# Patient Record
Sex: FEMALE | Race: WHITE | HISPANIC OR LATINO | URBAN - METROPOLITAN AREA
[De-identification: names, ages, dates, MRNs, and addresses within clinical notes are randomized per-mention and may not be internally consistent; named-entity substitution may affect disease eponyms.]

---

## 2022-08-24 ENCOUNTER — EMERGENCY (EMERGENCY)
Facility: HOSPITAL | Age: 77
LOS: 1 days | Discharge: HOME | End: 2022-08-24
Attending: STUDENT IN AN ORGANIZED HEALTH CARE EDUCATION/TRAINING PROGRAM
Payer: MEDICARE

## 2022-08-24 VITALS
SYSTOLIC BLOOD PRESSURE: 106 MMHG | RESPIRATION RATE: 16 BRPM | HEIGHT: 60 IN | HEART RATE: 68 BPM | TEMPERATURE: 98 F | WEIGHT: 164.02 LBS | DIASTOLIC BLOOD PRESSURE: 73 MMHG | OXYGEN SATURATION: 96 %

## 2022-08-24 DIAGNOSIS — G56.00 CARPAL TUNNEL SYNDROME, UNSPECIFIED UPPER LIMB: Chronic | ICD-10-CM

## 2022-08-24 LAB
APPEARANCE UR: CLEAR — SIGNIFICANT CHANGE UP
BACTERIA # UR AUTO: ABNORMAL /HPF
BILIRUB UR-MCNC: ABNORMAL
COLOR SPEC: YELLOW — SIGNIFICANT CHANGE UP
COMMENT - URINE: SIGNIFICANT CHANGE UP
DIFF PNL FLD: NEGATIVE — SIGNIFICANT CHANGE UP
EPI CELLS # UR: ABNORMAL /HPF
GLUCOSE UR QL: NEGATIVE — SIGNIFICANT CHANGE UP
HYALINE CASTS # UR AUTO: ABNORMAL /LPF
KETONES UR-MCNC: ABNORMAL
LEUKOCYTE ESTERASE UR-ACNC: ABNORMAL
NITRITE UR-MCNC: NEGATIVE — SIGNIFICANT CHANGE UP
PH UR: 5 — SIGNIFICANT CHANGE UP (ref 5–8)
PROT UR-MCNC: 30 MG/DL
RBC CASTS # UR COMP ASSIST: SIGNIFICANT CHANGE UP /HPF (ref 0–2)
SP GR SPEC: 1.02 — SIGNIFICANT CHANGE UP (ref 1.01–1.02)
UROBILINOGEN FLD QL: NEGATIVE — SIGNIFICANT CHANGE UP
WBC UR QL: SIGNIFICANT CHANGE UP /HPF (ref 0–5)

## 2022-08-24 PROCEDURE — 87086 URINE CULTURE/COLONY COUNT: CPT

## 2022-08-24 PROCEDURE — 99283 EMERGENCY DEPT VISIT LOW MDM: CPT

## 2022-08-24 PROCEDURE — 87186 SC STD MICRODIL/AGAR DIL: CPT

## 2022-08-24 PROCEDURE — 81001 URINALYSIS AUTO W/SCOPE: CPT

## 2022-08-24 PROCEDURE — 82962 GLUCOSE BLOOD TEST: CPT

## 2022-08-24 RX ORDER — CEFPODOXIME PROXETIL 100 MG
1 TABLET ORAL
Qty: 14 | Refills: 0
Start: 2022-08-24 | End: 2022-08-30

## 2022-08-24 NOTE — ED PROVIDER NOTE - CLINICAL SUMMARY MEDICAL DECISION MAKING FREE TEXT BOX
Edgard: 76 y.o female with a pmhx of hypertension and diabetes is presenting with x3 days of urinary frequency. Patient reports urinary frequency and urinary urgency for the past x2-3 days. Patient states she has a history of UTI with similar symptoms. Patient denies fevers, abdominal pain, burning with urination, flank pain, hematuria, vomiting, and diarrhea. Pt well appearing, abdomen non-tender, no CVA tenderness. Will obtain labs r/o UTI, supportive treatment with dispo pending workup.

## 2022-08-24 NOTE — ED PROVIDER NOTE - NSFOLLOWUPINSTRUCTIONS_ED_ALL_ED_FT
Urinary Frequency, Adult    Urinary frequency means urinating more often than usual. You may urinate every 1–2 hours even though you drink a normal amount of fluid and do not have a bladder infection or condition. Although you urinate more often than normal, the total amount of urine produced in a day is normal.    With urinary frequency, you may have an urgent need to urinate often. The stress and anxiety of needing to find a bathroom quickly can make this urge worse. This condition may go away on its own or you may need treatment at home. Home treatment may include bladder training, exercises, taking medicines, or making changes to your diet.    Follow these instructions at home:      Bladder health     Keep a bladder diary if told by your health care provider. Keep track of:    What you eat and drink.  How often you urinate.  How much you urinate.  Follow a bladder training program if told by your health care provider. This may include:    Learning to delay going to the bathroom.  Double urinating (voiding). This helps if you are not completely emptying your bladder.  Scheduled voiding.  Do Kegel exercises as told by your health care provider. Kegel exercises strengthen the muscles that help control urination, which may help the condition.        Eating and drinking    If told by your health care provider, make diet changes, such as:    Avoiding caffeine.  Drinking fewer fluids, especially alcohol.  Not drinking in the evening.  Avoiding foods or drinks that may irritate the bladder. These include coffee, tea, soda, artificial sweeteners, citrus, tomato-based foods, and chocolate.  Eating foods that help prevent or ease constipation. Constipation can make this condition worse. Your health care provider may recommend that you:    Drink enough fluid to keep your urine pale yellow.  Take over-the-counter or prescription medicines.  Eat foods that are high in fiber, such as beans, whole grains, and fresh fruits and vegetables.  Limit foods that are high in fat and processed sugars, such as fried or sweet foods.        General instructions    Take over-the-counter and prescription medicines only as told by your health care provider.  Keep all follow-up visits as told by your health care provider. This is important.    Contact a health care provider if:  You start urinating more often.  You feel pain or irritation when you urinate.  You notice blood in your urine.  Your urine looks cloudy.  You develop a fever.  You begin vomiting.    Get help right away if:  You are unable to urinate.    Summary  Urinary frequency means urinating more often than usual. With urinary frequency, you may urinate every 1–2 hours even though you drink a normal amount of fluid and do not have a bladder infection or other bladder condition.  Your health care provider may recommend that you keep a bladder diary, follow a bladder training program, or make dietary changes.  If told by your health care provider, do Kegel exercises to strengthen the muscles that help control urination.  Take over-the-counter and prescription medicines only as told by your health care provider.  Contact a health care provider if your symptoms do not improve or get worse.

## 2022-08-24 NOTE — ED PROVIDER NOTE - PROGRESS NOTE DETAILS
Ene-: pt seen and re-evaluated at bedside.  Discussed UA results showing trace leuk esterase, otherwise no other signs of UTI. Pt with UTI symptoms, will treat. Discussed importance of PMD follow up with return precautions, pt understood and agreeable with plan.

## 2022-08-24 NOTE — ED PROVIDER NOTE - OBJECTIVE STATEMENT
76 y.o female with a pmhx of hypertension and diabetes is presenting with x3 days of urinary frequency. Patient reports urinary frequency and urinary urgency for the past x2-3 days. Patient states she has a history of UTI with similar symptoms. Patient denies fevers, abdominal pain, burning with urination, flank pain, hematuria, vomiting, and diarrhea. Patient has not other complaints.

## 2022-08-24 NOTE — ED PROVIDER NOTE - PATIENT PORTAL LINK FT
You can access the FollowMyHealth Patient Portal offered by [facility] by registering at the following website: [email or web address]. By joining [employer]’s FollowMyHealth portal, you will also be able to view your health information using other applications (apps) compatible with our system.

## 2022-08-27 LAB
-  AMPICILLIN/SULBACTAM: SIGNIFICANT CHANGE UP
-  CEFAZOLIN: SIGNIFICANT CHANGE UP
-  GENTAMICIN: SIGNIFICANT CHANGE UP
-  OXACILLIN: SIGNIFICANT CHANGE UP
-  PENICILLIN: SIGNIFICANT CHANGE UP
-  RIFAMPIN: SIGNIFICANT CHANGE UP
-  TETRACYCLINE: SIGNIFICANT CHANGE UP
-  TRIMETHOPRIM/SULFAMETHOXAZOLE: SIGNIFICANT CHANGE UP
-  VANCOMYCIN: SIGNIFICANT CHANGE UP
CULTURE RESULTS: SIGNIFICANT CHANGE UP
METHOD TYPE: SIGNIFICANT CHANGE UP
ORGANISM # SPEC MICROSCOPIC CNT: SIGNIFICANT CHANGE UP
ORGANISM # SPEC MICROSCOPIC CNT: SIGNIFICANT CHANGE UP
SPECIMEN SOURCE: SIGNIFICANT CHANGE UP

## 2022-10-01 ENCOUNTER — EMERGENCY (EMERGENCY)
Facility: HOSPITAL | Age: 77
LOS: 1 days | Discharge: HOME | End: 2022-10-01
Attending: EMERGENCY MEDICINE
Payer: MEDICARE

## 2022-10-01 VITALS
RESPIRATION RATE: 18 BRPM | HEART RATE: 82 BPM | TEMPERATURE: 98 F | WEIGHT: 141.1 LBS | DIASTOLIC BLOOD PRESSURE: 85 MMHG | HEIGHT: 60 IN | SYSTOLIC BLOOD PRESSURE: 139 MMHG | OXYGEN SATURATION: 98 %

## 2022-10-01 DIAGNOSIS — G56.00 CARPAL TUNNEL SYNDROME, UNSPECIFIED UPPER LIMB: Chronic | ICD-10-CM

## 2022-10-01 PROCEDURE — 99283 EMERGENCY DEPT VISIT LOW MDM: CPT | Mod: 25

## 2022-10-01 PROCEDURE — 87205 SMEAR GRAM STAIN: CPT

## 2022-10-01 PROCEDURE — 10061 I&D ABSCESS COMP/MULTIPLE: CPT

## 2022-10-01 PROCEDURE — 87186 SC STD MICRODIL/AGAR DIL: CPT

## 2022-10-01 PROCEDURE — 99284 EMERGENCY DEPT VISIT MOD MDM: CPT | Mod: 25

## 2022-10-01 PROCEDURE — 10060 I&D ABSCESS SIMPLE/SINGLE: CPT

## 2022-10-01 PROCEDURE — 87070 CULTURE OTHR SPECIMN AEROBIC: CPT

## 2022-10-01 RX ORDER — CEPHALEXIN 500 MG
1 CAPSULE ORAL
Qty: 15 | Refills: 0
Start: 2022-10-01 | End: 2022-10-05

## 2022-10-01 NOTE — ED PROVIDER NOTE - OBJECTIVE STATEMENT
77 y/o female with a history of hypertension, diabetes and takes Metformin presents to the ER with an abscess/sore on her lower right buttock that started about 3 days ago. Patient endorses a subjective fever. Patient allergic to codeine.

## 2022-10-01 NOTE — ED PROVIDER NOTE - PATIENT PORTAL LINK FT
You can access the FollowMyHealth Patient Portal offered by [facility] by registering at the following website: [email or web address]. By joining [employer]’s FollowMyHealth portal, you will also be able to view your health information using other applications (apps) compatible with our system.

## 2022-10-01 NOTE — ED PROVIDER NOTE - PROGRESS NOTE DETAILS
I and D drained, pt started on abx, and return in 48 hrs for wound check. Return precautions provided, and ready for d/c.

## 2022-10-01 NOTE — ED PROCEDURE NOTE - CPROC ED POST PROC CARE GUIDE1
Verbal/written post procedure instructions were given to patient/caregiver. Verbal/written post procedure instructions were given to patient/caregiver./Instructed patient/caregiver to follow-up with primary care physician./Instructed patient/caregiver regarding signs and symptoms of infection./Keep the cast/splint/dressing clean and dry.

## 2022-10-01 NOTE — ED PROVIDER NOTE - CLINICAL SUMMARY MEDICAL DECISION MAKING FREE TEXT BOX
Abscess in right lower buttock. Plan is to do I&D. Abscess in right lower buttock. No evidence of rectal involvement or vaginal involvement, or cellulitis  Plan is to do I&D.

## 2022-10-01 NOTE — ED ADULT NURSE NOTE - NSIMPLEMENTINTERV_GEN_ALL_ED
Implemented All Universal Safety Interventions:  [place] to call system. Call bell, personal items and telephone within reach. Instruct patient to call for assistance. Room bathroom lighting operational. Non-slip footwear when patient is off stretcher. Physically safe environment: no spills, clutter or unnecessary equipment. Stretcher in lowest position, wheels locked, appropriate side rails in place.

## 2022-10-01 NOTE — ED PROVIDER NOTE - NSICDXPASTMEDICALHX_GEN_ALL_CORE_FT
PAST MEDICAL HISTORY:  Gastritis     History of hypertension     Hypercholesterolemia     Osteopenia

## 2022-10-03 ENCOUNTER — EMERGENCY (EMERGENCY)
Facility: HOSPITAL | Age: 77
LOS: 1 days | Discharge: HOME | End: 2022-10-03
Attending: STUDENT IN AN ORGANIZED HEALTH CARE EDUCATION/TRAINING PROGRAM
Payer: MEDICARE

## 2022-10-03 VITALS
HEART RATE: 67 BPM | TEMPERATURE: 98 F | RESPIRATION RATE: 19 BRPM | DIASTOLIC BLOOD PRESSURE: 73 MMHG | WEIGHT: 151.9 LBS | OXYGEN SATURATION: 95 % | SYSTOLIC BLOOD PRESSURE: 116 MMHG | HEIGHT: 60 IN

## 2022-10-03 DIAGNOSIS — G56.00 CARPAL TUNNEL SYNDROME, UNSPECIFIED UPPER LIMB: Chronic | ICD-10-CM

## 2022-10-03 LAB
-  AMPICILLIN/SULBACTAM: SIGNIFICANT CHANGE UP
-  CEFAZOLIN: SIGNIFICANT CHANGE UP
-  CLINDAMYCIN: SIGNIFICANT CHANGE UP
-  ERYTHROMYCIN: SIGNIFICANT CHANGE UP
-  GENTAMICIN: SIGNIFICANT CHANGE UP
-  OXACILLIN: SIGNIFICANT CHANGE UP
-  PENICILLIN: SIGNIFICANT CHANGE UP
-  RIFAMPIN: SIGNIFICANT CHANGE UP
-  TETRACYCLINE: SIGNIFICANT CHANGE UP
-  TRIMETHOPRIM/SULFAMETHOXAZOLE: SIGNIFICANT CHANGE UP
-  VANCOMYCIN: SIGNIFICANT CHANGE UP
METHOD TYPE: SIGNIFICANT CHANGE UP

## 2022-10-03 PROCEDURE — 99283 EMERGENCY DEPT VISIT LOW MDM: CPT

## 2022-10-03 PROCEDURE — G0463: CPT

## 2022-10-03 RX ORDER — ACETAMINOPHEN 500 MG
975 TABLET ORAL ONCE
Refills: 0 | Status: COMPLETED | OUTPATIENT
Start: 2022-10-03 | End: 2022-10-03

## 2022-10-03 RX ORDER — AZTREONAM 2 G
1 VIAL (EA) INJECTION
Qty: 14 | Refills: 0
Start: 2022-10-03 | End: 2022-10-09

## 2022-10-03 RX ADMIN — Medication 975 MILLIGRAM(S): at 14:27

## 2022-10-03 RX ADMIN — Medication 1 TABLET(S): at 14:27

## 2022-10-03 NOTE — ED ADULT TRIAGE NOTE - CHIEF COMPLAINT QUOTE
patient here for F/U of I&D of right lower buttock that was done on 10/1/22 was told to return for F/U

## 2022-10-03 NOTE — ED PROVIDER NOTE - CLINICAL SUMMARY MEDICAL DECISION MAKING FREE TEXT BOX
Edgard: 76 year old female with PMH HTN, DM presents for wound check. Pt with abscess to right buttock x 5 days, seen 2 days ago with I&D performed and packing placed, discharged with Keflex. Pt returning today for wound check. Pt reports mildly improved pain and occasional nausea with Keflex. Denies any fevers, abdominal pain, numbness, weakness, or rash. Abscess site with surrounding erythema, minimal fluctuance, no drainage. Packing in place. Wound culture growing staph aureus, sensitivities pending. Will change abx for MRSA coverage, advised pt to return in 2 days for repeat wound check with strict return precautions. Pt understood and agreeable with plan.

## 2022-10-03 NOTE — ED PROVIDER NOTE - PHYSICAL EXAMINATION
CONSTITUTIONAL: non-toxic, well appearing  SKIN: no petechiae.  EYES: pink conjunctiva, anicteric  NECK: Supple; no meningismus, no JVD  CARD: RRR, no murmurs, equal radial pulses bilaterally 2+  RESP: CTAB, no respiratory distress  ABD: Soft, non-tender, non-distended, no peritoneal signs, no CVA tenderness  : Chaperone RN Chris. Right buttock abscess with surrounding erythema, packing in place, tender, no discharge noted.   EXT: Normal ROM x4. No edema.   NEURO: Alert, oriented. Neuro exam nonfocal.  PSYCH: Cooperative, appropriate.

## 2022-10-03 NOTE — ED PROVIDER NOTE - NSFOLLOWUPINSTRUCTIONS_ED_ALL_ED_FT
Abscess    An abscess is an infected area that contains a collection of pus and debris. It can occur in almost any part of the body and occurs when the tissue gets infection. Symptoms include a painful mass that is red, warm, tender that might break open and HAVE drainage. If your health care provider gave you antibiotics make sure to take the full course and do not stop even if feeling better.     Take over the counter acetaminophen (Tylenol) 650-1000 mg every 4-6 hours as needed for pain. Do not take more than 3000 mg in a 24 hour period. Be aware many over the counter and prescription medications also contain acetaminophen (Tylenol).     Stop taking cephalexin and start taking trimethoprim-sulfamethoxazole (Bactrim) twice daily for 7 days.    Return in 2 days for wound check.     SEEK IMMEDIATE MEDICAL CARE IF YOU HAVE ANY OF THE FOLLOWING SYMPTOMS: chills, fever, muscle aches, or red streaking from the area.

## 2022-10-03 NOTE — ED PROVIDER NOTE - PATIENT PORTAL LINK FT
Medical Week 1 Survey      Responses   Facility patient discharged from?  [facility]   Does the patient have one of the following disease processes/diagnoses(primary or secondary)?  Other   Is there a successful TCM telephone encounter documented?  No   Week 1 attempt successful?  Yes   Call start time  1138   Call end time  1143   Discharge diagnosis  GI bleed   Is patient permission given to speak with other caregiver?  Yes   List who call center can speak with     Meds reviewed with patient/caregiver?  Yes   Is the patient having any side effects they believe may be caused by any medication additions or changes?  No   Does the patient have all medications ordered at discharge?  Yes   Is the patient taking all medications as directed (includes completed medication regime)?  Yes   Does the patient have a primary care provider?   Yes   Does the patient have an appointment with their PCP within 7 days of discharge?  Yes   Has the patient kept scheduled appointments due by today?  N/A   Psychosocial issues?  No   Comments  Pt reports no further blood in stool. Having soft,formed stool. Still having some abd pain 3/10. Eating small frequent meals, no fresh fruits/veggies per pt. Appetite is WNL.    Did the patient receive a copy of their discharge instructions?  Yes   Nursing interventions  Reviewed instructions with patient   What is the patient's perception of their health status since discharge?  Improving   Is the patient/caregiver able to teach back signs and symptoms related to disease process for when to call PCP?  Yes   Is the patient/caregiver able to teach back signs and symptoms related to disease process for when to call 911?  Yes   Is the patient/caregiver able to teach back the hierarchy of who to call/visit for symptoms/problems? PCP, Specialist, Home health nurse, Urgent Care, ED, 911  Yes   Week 1 call completed?  Yes          Macie Cadena RN   You can access the FollowMyHealth Patient Portal offered by [facility] by registering at the following website: [email or web address]. By joining [employer]’s FollowMyHealth portal, you will also be able to view your health information using other applications (apps) compatible with our system.

## 2022-10-03 NOTE — ED PROVIDER NOTE - OBJECTIVE STATEMENT
76 year old female with PMH HTN, DM presents for wound check. Pt with abscess to right buttock x 5 days, seen 2 days ago with I&D performed and packing placed, discharged with Keflex. Pt returning today for wound check. Pt reports mildly improved pain and occasional nausea with Keflex. Denies any fevers, abdominal pain, numbness, weakness, or rash. Denies any additional complaints.

## 2022-10-06 ENCOUNTER — INPATIENT (INPATIENT)
Facility: HOSPITAL | Age: 77
LOS: 31 days | Discharge: HOME | DRG: 603 | End: 2022-11-07
Attending: INTERNAL MEDICINE | Admitting: INTERNAL MEDICINE
Payer: MEDICARE

## 2022-10-06 VITALS
TEMPERATURE: 98 F | WEIGHT: 141.1 LBS | SYSTOLIC BLOOD PRESSURE: 123 MMHG | RESPIRATION RATE: 16 BRPM | OXYGEN SATURATION: 96 % | DIASTOLIC BLOOD PRESSURE: 80 MMHG | HEIGHT: 60 IN | HEART RATE: 61 BPM

## 2022-10-06 DIAGNOSIS — I10 ESSENTIAL (PRIMARY) HYPERTENSION: ICD-10-CM

## 2022-10-06 DIAGNOSIS — N17.9 ACUTE KIDNEY FAILURE, UNSPECIFIED: ICD-10-CM

## 2022-10-06 DIAGNOSIS — G56.00 CARPAL TUNNEL SYNDROME, UNSPECIFIED UPPER LIMB: Chronic | ICD-10-CM

## 2022-10-06 DIAGNOSIS — L02.31 CUTANEOUS ABSCESS OF BUTTOCK: ICD-10-CM

## 2022-10-06 DIAGNOSIS — L02.215 CUTANEOUS ABSCESS OF PERINEUM: ICD-10-CM

## 2022-10-06 DIAGNOSIS — Z29.9 ENCOUNTER FOR PROPHYLACTIC MEASURES, UNSPECIFIED: ICD-10-CM

## 2022-10-06 DIAGNOSIS — E11.9 TYPE 2 DIABETES MELLITUS WITHOUT COMPLICATIONS: ICD-10-CM

## 2022-10-06 LAB
ALBUMIN SERPL ELPH-MCNC: 3.4 G/DL — LOW (ref 3.5–5)
ALP SERPL-CCNC: 81 U/L — SIGNIFICANT CHANGE UP (ref 40–120)
ALT FLD-CCNC: 18 U/L DA — SIGNIFICANT CHANGE UP (ref 10–60)
ANION GAP SERPL CALC-SCNC: 10 MMOL/L — SIGNIFICANT CHANGE UP (ref 5–17)
APTT BLD: 26 SEC — LOW (ref 27.5–35.5)
AST SERPL-CCNC: 19 U/L — SIGNIFICANT CHANGE UP (ref 10–40)
BASOPHILS # BLD AUTO: 0.08 K/UL — SIGNIFICANT CHANGE UP (ref 0–0.2)
BASOPHILS NFR BLD AUTO: 1.1 % — SIGNIFICANT CHANGE UP (ref 0–2)
BILIRUB SERPL-MCNC: 0.3 MG/DL — SIGNIFICANT CHANGE UP (ref 0.2–1.2)
BLD GP AB SCN SERPL QL: SIGNIFICANT CHANGE UP
BUN SERPL-MCNC: 20 MG/DL — HIGH (ref 7–18)
CALCIUM SERPL-MCNC: 10.2 MG/DL — SIGNIFICANT CHANGE UP (ref 8.4–10.5)
CHLORIDE SERPL-SCNC: 101 MMOL/L — SIGNIFICANT CHANGE UP (ref 96–108)
CO2 SERPL-SCNC: 25 MMOL/L — SIGNIFICANT CHANGE UP (ref 22–31)
CREAT SERPL-MCNC: 1.33 MG/DL — HIGH (ref 0.5–1.3)
CULTURE RESULTS: SIGNIFICANT CHANGE UP
EGFR: 41 ML/MIN/1.73M2 — LOW
EOSINOPHIL # BLD AUTO: 0.28 K/UL — SIGNIFICANT CHANGE UP (ref 0–0.5)
EOSINOPHIL NFR BLD AUTO: 3.9 % — SIGNIFICANT CHANGE UP (ref 0–6)
GLUCOSE SERPL-MCNC: 116 MG/DL — HIGH (ref 70–99)
HCT VFR BLD CALC: 43.4 % — SIGNIFICANT CHANGE UP (ref 34.5–45)
HGB BLD-MCNC: 14.2 G/DL — SIGNIFICANT CHANGE UP (ref 11.5–15.5)
IMM GRANULOCYTES NFR BLD AUTO: 0.6 % — SIGNIFICANT CHANGE UP (ref 0–0.9)
INR BLD: 0.96 RATIO — SIGNIFICANT CHANGE UP (ref 0.88–1.16)
LACTATE SERPL-SCNC: 1.2 MMOL/L — SIGNIFICANT CHANGE UP (ref 0.7–2)
LACTATE SERPL-SCNC: 2.8 MMOL/L — HIGH (ref 0.7–2)
LYMPHOCYTES # BLD AUTO: 1.45 K/UL — SIGNIFICANT CHANGE UP (ref 1–3.3)
LYMPHOCYTES # BLD AUTO: 20.4 % — SIGNIFICANT CHANGE UP (ref 13–44)
MCHC RBC-ENTMCNC: 30.9 PG — SIGNIFICANT CHANGE UP (ref 27–34)
MCHC RBC-ENTMCNC: 32.7 GM/DL — SIGNIFICANT CHANGE UP (ref 32–36)
MCV RBC AUTO: 94.3 FL — SIGNIFICANT CHANGE UP (ref 80–100)
MONOCYTES # BLD AUTO: 0.6 K/UL — SIGNIFICANT CHANGE UP (ref 0–0.9)
MONOCYTES NFR BLD AUTO: 8.5 % — SIGNIFICANT CHANGE UP (ref 2–14)
NEUTROPHILS # BLD AUTO: 4.65 K/UL — SIGNIFICANT CHANGE UP (ref 1.8–7.4)
NEUTROPHILS NFR BLD AUTO: 65.5 % — SIGNIFICANT CHANGE UP (ref 43–77)
NRBC # BLD: 0 /100 WBCS — SIGNIFICANT CHANGE UP (ref 0–0)
ORGANISM # SPEC MICROSCOPIC CNT: SIGNIFICANT CHANGE UP
ORGANISM # SPEC MICROSCOPIC CNT: SIGNIFICANT CHANGE UP
PLATELET # BLD AUTO: 387 K/UL — SIGNIFICANT CHANGE UP (ref 150–400)
POTASSIUM SERPL-MCNC: 3.7 MMOL/L — SIGNIFICANT CHANGE UP (ref 3.5–5.3)
POTASSIUM SERPL-SCNC: 3.7 MMOL/L — SIGNIFICANT CHANGE UP (ref 3.5–5.3)
PROT SERPL-MCNC: 7.9 G/DL — SIGNIFICANT CHANGE UP (ref 6–8.3)
PROTHROM AB SERPL-ACNC: 11.4 SEC — SIGNIFICANT CHANGE UP (ref 10.5–13.4)
RBC # BLD: 4.6 M/UL — SIGNIFICANT CHANGE UP (ref 3.8–5.2)
RBC # FLD: 12.9 % — SIGNIFICANT CHANGE UP (ref 10.3–14.5)
SARS-COV-2 RNA SPEC QL NAA+PROBE: SIGNIFICANT CHANGE UP
SODIUM SERPL-SCNC: 136 MMOL/L — SIGNIFICANT CHANGE UP (ref 135–145)
SPECIMEN SOURCE: SIGNIFICANT CHANGE UP
WBC # BLD: 7.1 K/UL — SIGNIFICANT CHANGE UP (ref 3.8–10.5)
WBC # FLD AUTO: 7.1 K/UL — SIGNIFICANT CHANGE UP (ref 3.8–10.5)

## 2022-10-06 PROCEDURE — 99285 EMERGENCY DEPT VISIT HI MDM: CPT

## 2022-10-06 PROCEDURE — 74177 CT ABD & PELVIS W/CONTRAST: CPT | Mod: 26,MA

## 2022-10-06 RX ORDER — VANCOMYCIN HCL 1 G
1000 VIAL (EA) INTRAVENOUS EVERY 12 HOURS
Refills: 0 | Status: DISCONTINUED | OUTPATIENT
Start: 2022-10-06 | End: 2022-10-06

## 2022-10-06 RX ORDER — ACETAMINOPHEN 500 MG
650 TABLET ORAL ONCE
Refills: 0 | Status: COMPLETED | OUTPATIENT
Start: 2022-10-06 | End: 2022-10-06

## 2022-10-06 RX ORDER — SODIUM CHLORIDE 9 MG/ML
1000 INJECTION INTRAMUSCULAR; INTRAVENOUS; SUBCUTANEOUS
Refills: 0 | Status: DISCONTINUED | OUTPATIENT
Start: 2022-10-06 | End: 2022-11-07

## 2022-10-06 RX ORDER — SODIUM CHLORIDE 9 MG/ML
1000 INJECTION INTRAMUSCULAR; INTRAVENOUS; SUBCUTANEOUS ONCE
Refills: 0 | Status: COMPLETED | OUTPATIENT
Start: 2022-10-06 | End: 2022-10-06

## 2022-10-06 RX ORDER — CEFEPIME 1 G/1
1000 INJECTION, POWDER, FOR SOLUTION INTRAMUSCULAR; INTRAVENOUS EVERY 8 HOURS
Refills: 0 | Status: DISCONTINUED | OUTPATIENT
Start: 2022-10-06 | End: 2022-10-06

## 2022-10-06 RX ORDER — ACETAMINOPHEN 500 MG
650 TABLET ORAL EVERY 6 HOURS
Refills: 0 | Status: DISCONTINUED | OUTPATIENT
Start: 2022-10-06 | End: 2022-11-07

## 2022-10-06 RX ORDER — CEFEPIME 1 G/1
1000 INJECTION, POWDER, FOR SOLUTION INTRAMUSCULAR; INTRAVENOUS EVERY 8 HOURS
Refills: 0 | Status: DISCONTINUED | OUTPATIENT
Start: 2022-10-07 | End: 2022-10-07

## 2022-10-06 RX ORDER — MORPHINE SULFATE 50 MG/1
1 CAPSULE, EXTENDED RELEASE ORAL EVERY 6 HOURS
Refills: 0 | Status: DISCONTINUED | OUTPATIENT
Start: 2022-10-06 | End: 2022-10-07

## 2022-10-06 RX ORDER — MORPHINE SULFATE 50 MG/1
1 CAPSULE, EXTENDED RELEASE ORAL EVERY 6 HOURS
Refills: 0 | Status: DISCONTINUED | OUTPATIENT
Start: 2022-10-06 | End: 2022-10-06

## 2022-10-06 RX ORDER — ENOXAPARIN SODIUM 100 MG/ML
40 INJECTION SUBCUTANEOUS EVERY 24 HOURS
Refills: 0 | Status: DISCONTINUED | OUTPATIENT
Start: 2022-10-06 | End: 2022-11-07

## 2022-10-06 RX ORDER — LIDOCAINE HCL 20 MG/ML
5 VIAL (ML) INJECTION ONCE
Refills: 0 | Status: COMPLETED | OUTPATIENT
Start: 2022-10-06 | End: 2022-10-06

## 2022-10-06 RX ORDER — VANCOMYCIN HCL 1 G
1000 VIAL (EA) INTRAVENOUS ONCE
Refills: 0 | Status: COMPLETED | OUTPATIENT
Start: 2022-10-06 | End: 2022-10-06

## 2022-10-06 RX ORDER — VANCOMYCIN HCL 1 G
1000 VIAL (EA) INTRAVENOUS EVERY 24 HOURS
Refills: 0 | Status: DISCONTINUED | OUTPATIENT
Start: 2022-10-07 | End: 2022-10-07

## 2022-10-06 RX ADMIN — SODIUM CHLORIDE 80 MILLILITER(S): 9 INJECTION INTRAMUSCULAR; INTRAVENOUS; SUBCUTANEOUS at 22:34

## 2022-10-06 RX ADMIN — Medication 650 MILLIGRAM(S): at 16:02

## 2022-10-06 RX ADMIN — Medication 650 MILLIGRAM(S): at 17:02

## 2022-10-06 RX ADMIN — Medication 250 MILLIGRAM(S): at 15:37

## 2022-10-06 RX ADMIN — Medication 600 MILLIGRAM(S): at 13:30

## 2022-10-06 RX ADMIN — SODIUM CHLORIDE 1000 MILLILITER(S): 9 INJECTION INTRAMUSCULAR; INTRAVENOUS; SUBCUTANEOUS at 13:30

## 2022-10-06 RX ADMIN — Medication 100 MILLIGRAM(S): at 12:55

## 2022-10-06 RX ADMIN — Medication 650 MILLIGRAM(S): at 22:33

## 2022-10-06 RX ADMIN — SODIUM CHLORIDE 1000 MILLILITER(S): 9 INJECTION INTRAMUSCULAR; INTRAVENOUS; SUBCUTANEOUS at 12:55

## 2022-10-06 RX ADMIN — Medication 650 MILLIGRAM(S): at 22:59

## 2022-10-06 RX ADMIN — Medication 1000 MILLIGRAM(S): at 16:37

## 2022-10-06 RX ADMIN — Medication 5 MILLILITER(S): at 12:03

## 2022-10-06 NOTE — CONSULT NOTE ADULT - ASSESSMENT
77 y/o female hx of DM, s/p perineal abscess I&D by ED 10/1. Returning to ED for wound check. Lactate 2.8, WBC WNL, afebrile, VSS  -No plans for further I&D at this time, perineal abscess s/p I&D, spontaneously draining  -Antibiotics as per C&S  -Local wound care, plan for daily packing changes  -Pain control PRN   -Discussed with Dr. Templeton

## 2022-10-06 NOTE — ED PROVIDER NOTE - NS ED ATTENDING STATEMENT MOD
This was a shared visit with the JOAN. I reviewed and verified the documentation and independently performed the documented:

## 2022-10-06 NOTE — H&P ADULT - ATTENDING COMMENTS
Seen and examined . It still hurts . CT scan noted. Surgery input appreciated. IV Abxs . Will consut ID .

## 2022-10-06 NOTE — H&P ADULT - NSHPREVIEWOFSYSTEMS_GEN_ALL_CORE
REVIEW OF SYSTEMS:    CONSTITUTIONAL: No weakness, fevers or chills  EYES/ENT: No visual changes;  No vertigo or throat pain   NECK: No pain or stiffness  RESPIRATORY: No cough, wheezing, hemoptysis; No shortness of breath  CARDIOVASCULAR: No chest pain or palpitations  GASTROINTESTINAL: No abdominal or epigastric pain. No nausea, vomiting, or hematemesis; No diarrhea or constipation. No melena or hematochezia.  GENITOURINARY: No dysuria, frequency or hematuria  NEUROLOGICAL: No numbness or weakness  SKIN: No itching, burning, rashes, +lesions   All other review of systems is negative unless indicated above.

## 2022-10-06 NOTE — ED PROVIDER NOTE - OBJECTIVE STATEMENT
76 year old female with PMH HTN, DM presents for wound check. Pt with abscess to right buttock x 7 days, seen 4 days ago, 2 days ago again with I&D performed and packing placed, discharged with Keflex initially then bactrim 2 days ago. Pt returning today for wound check. Pt reports area is starting to get worse despite abx. Denies any fevers, abdominal pain, numbness, weakness, or rash. Denies any additional complaints.

## 2022-10-06 NOTE — H&P ADULT - NSHPPHYSICALEXAM_GEN_ALL_CORE
PHYSICAL EXAMINATION:  GENERAL: NAD, well built  HEAD:  Atraumatic, Normocephalic  EYES:  conjunctiva and sclera clear  NECK: Supple, No JVD, Normal thyroid  CHEST/LUNG: Clear to auscultation. Clear to percussion bilaterally; No rales, rhonchi, wheezing, or rubs  HEART: Regular rate and rhythm; No murmurs, rubs, or gallops  ABDOMEN: Soft, Nontender, Nondistended; Bowel sounds present  NERVOUS SYSTEM:  Alert & Oriented X3,    EXTREMITIES:  2+ Peripheral Pulses, No clubbing, cyanosis, or edema  : Right lower perineum covered in dressing, no active drainage

## 2022-10-06 NOTE — CONSULT NOTE ADULT - SUBJECTIVE AND OBJECTIVE BOX
GENERAL SURGERY CONSULT NOTE    Patient is a 76y old  Female who presents with a chief complaint of perineal abscess     HPI:76 year old female with PMH HTN, DM presents for wound check. Pt with abscess to right buttock x 7 days, seen 4 days ago, 2 days ago again with I&D performed and packing placed, discharged with Keflex initially then bactrim 2 days ago. Pt returning today for wound check. Pt reports area is starting to get worse despite abx. Denies any fevers, abdominal pain, numbness, weakness, or rash. Denies any additional complaints.    General surgery consulted on 77 y/o female with right sided perineal abscess that began one week ago. Pt was seen in the 10/1 and an I&D was performed, 5cc of purulent fluid expressed, packing was placed and sent home with Keflex. Pt returned to the ED 10/3 for wound check, wound was OK, antibiotics were changed to bactrim. Pt returning to ED today with complaints of continued pain, worsening erythema, purulent discharge spontaneously draining from abscess site. Pt seen and evaluated at bedside, wound open with SS drainage noted on dressing, packing removed prior to examination. Reports subjective fever/chills at home, denies N/V, change in bowel or bladder function, chest pain or SOB.     PAST MEDICAL & SURGICAL HISTORY:  History of hypertension      Osteopenia      Gastritis      Hypercholesterolemia      Carpal tunnel syndrome          Review of Systems:    I have reviewed 9 systems with the patient and the only positive findings were above    MEDICATIONS  (STANDING):    MEDICATIONS  (PRN):      Allergies    codeine (Rash)    Intolerances      FAMILY HISTORY:      Vital Signs Last 24 Hrs  T(C): 36.4 (06 Oct 2022 10:25), Max: 36.4 (06 Oct 2022 10:25)  T(F): 97.5 (06 Oct 2022 10:25), Max: 97.5 (06 Oct 2022 10:25)  HR: 61 (06 Oct 2022 10:25) (61 - 61)  BP: 123/80 (06 Oct 2022 10:25) (123/80 - 123/80)  BP(mean): --  RR: 16 (06 Oct 2022 10:25) (16 - 16)  SpO2: 96% (06 Oct 2022 10:25) (96% - 96%)    Parameters below as of 06 Oct 2022 10:25  Patient On (Oxygen Delivery Method): room air        Physical Exam:    General:  Appears stated age, well-groomed, no distress  Eyes : EOMI   HENT:  WNL, no JVD  Respirations: Unlabored   Abdomen: Soft, nondistended, nontender   Rectal: Right lower buttock, perineum incision site spontaneously draining SS fluid, surrounding erythema and induration, no palpable fluctuance or crepitus  Extremities: No edema b/l   Skin:  Warm and dry   Musculoskeletal:  No calf tenderness b/l   Neuro: Alert, oriented to time, place and person   Psych:  Normal affect       LABS:                        14.2   7.10  )-----------( 387      ( 06 Oct 2022 12:00 )             43.4     10-06    136  |  101  |  20<H>  ----------------------------<  116<H>  3.7   |  25  |  1.33<H>    Ca    10.2      06 Oct 2022 12:00    TPro  7.9  /  Alb  3.4<L>  /  TBili  0.3  /  DBili  x   /  AST  19  /  ALT  18  /  AlkPhos  81  10-06    PT/INR - ( 06 Oct 2022 12:00 )   PT: 11.4 sec;   INR: 0.96 ratio         PTT - ( 06 Oct 2022 12:00 )  PTT:26.0 sec

## 2022-10-06 NOTE — H&P ADULT - HISTORY OF PRESENT ILLNESS
76 year old with PMH of HTN, DM is here for worsening of perineal abscess.  Patient presented with perineal abscess and had an I/D on 10/1 in the ED and was discharged on keflex.  Patient states that  worsening erythema, purulent discharge spontaneously draining from abscess site. 76 year old with PMH of HTN, DM is here for worsening of perineal abscess.  Patient presented with perineal abscess and had an I/D on 10/1 in the ED and was discharged on keflex.  Patient states that she had worsening redness and  purulent discharge from abscess site. Patient also endorses subjective fevers, but denies any chills, nausea vomiting chest pain, SOB, abdominal pain, or change in bowel habit.     In the ED:  Afebrile, hemodynamically stable  No leukocytosis  lactate 2.5  CT A/P showed right gluteal fold  with infiltration w/o  discrete drainable collection

## 2022-10-06 NOTE — ED PROVIDER NOTE - PHYSICAL EXAMINATION
GEN:   comfortable, in no apparent distress, AOx3  EYES:   PERRL, extra-occular movements intact  HEENT:   airway patent, moist mucosal membranes, uvula midline  CV:  RRR, Pulses- Radial: 2+ bilateral and equal  RESP:   clear to auscultation bilaterally, non-labored, speaking in full sentences  ABD:   soft, non tender, no guarding  :   no cva tenderness  MSK:   no musculoskeletal tenderness, 5/5 strength, moving all extremities  SKIN:   area of redness and induration in perineum with 1cm incision with packing.  Surrounding area of induration extending to posterior labia majora and gluteal fold. Skin otherwise dry, intact, no rash.  No crepitus, no evidence of taina's gangrene.   NEURO:   AOx3, no focal weakness or loss of sensation, gait normal, GCS 15  PSYCH: calm, cooperative, no apparent risk to self and others

## 2022-10-06 NOTE — H&P ADULT - ASSESSMENT
76 year old with PMH of HTN, DM is here for worsening of perineal abscess after I/D on 10/1. Admitted for further management

## 2022-10-06 NOTE — H&P ADULT - PROBLEM SELECTOR PLAN 5
IMPROVE VTE Individual Risk Assessment          RISK                                                          Points  [  ] Previous VTE                                                3  [  ] Thrombophilia                                             2  [  ] Lower limb paralysis                                   2        (unable to hold up >15 seconds)    [  ] Current Cancer                                             2         (within 6 months)  [x  ] Immobilization > 24 hrs                              1  [  ] ICU/CCU stay > 24 hours                             1  [ x ] Age > 60                                                         1    IMPROVE VTE Score:         [     2    ]    Will start Lovenox 40mg

## 2022-10-06 NOTE — ED PROVIDER NOTE - CLINICAL SUMMARY MEDICAL DECISION MAKING FREE TEXT BOX
76 year old female with PMH HTN, DM presents for wound check. Pt with abscess to right buttock x 7 days, seen 4 days ago, 2 days ago again with I&D performed and packing placed, discharged with Keflex initially then bactrim 2 days ago. Pt returning today for wound check. Pt reports area is starting to get worse despite abx.  Area anesthestized with lidocaine and probed, unable to break up loculations. Will consult surgery and likely admit patient for further management.

## 2022-10-06 NOTE — ED PROVIDER NOTE - NSICDXPASTMEDICALHX_GEN_ALL_CORE_FT
PAST MEDICAL HISTORY:  Gastritis     History of hypertension     Hypercholesterolemia     Osteopenia     
English

## 2022-10-06 NOTE — H&P ADULT - PROBLEM SELECTOR PLAN 1
perineal abscess for 7 days, s/p I/D on 10/1  Now with worsening erythema with pus drainage  Afebrile, no leukocytosis  Lactate 2.8  s/p vanc /clindamycin and 1L bolus    Surgery Consulted: No plans for further I&D at this time  Will C/w vanc and cefepime  f/u repeat lactate perineal abscess for 7 days, s/p I/D on 10/1  Now with worsening erythema with pus drainage  Afebrile, no leukocytosis  Lactate 2.8  CT A/P showed right gluteal fold  with infiltration w/o  discrete drainable collection  s/p vanc /clindamycin and 1L bolus    Surgery Consulted: No plans for further I&D at this time  Will C/w vanc and cefepime  f/u repeat lactate perineal abscess for 7 days, s/p I/D on 10/1  Now with worsening erythema with pus drainage  Afebrile, no leukocytosis  Lactate 2.8  CT A/P showed right gluteal fold  with infiltration w/o  discrete drainable collection  s/p vanc /clindamycin and 1L bolus    Surgery Consulted: No plans for further I&D at this time  Will C/w vanc and cefepime  f/u repeat lactate  Consulted Dr. Limon

## 2022-10-06 NOTE — ED ADULT NURSE NOTE - NSIMPLEMENTINTERV_GEN_ALL_ED
Implemented All Universal Safety Interventions:  [place] to call system. Call bell, personal items and telephone within reach. Instruct patient to call for assistance. Room bathroom lighting operational. Non-slip footwear when patient is off stretcher. Physically safe environment: no spills, clutter or unnecessary equipment. Stretcher in lowest position, wheels locked, appropriate side rails in place.

## 2022-10-06 NOTE — H&P ADULT - NSHPLABSRESULTS_GEN_ALL_CORE
LABS:                        14.2   7.10  )-----------( 387      ( 06 Oct 2022 12:00 )             43.4     10-06    136  |  101  |  20<H>  ----------------------------<  116<H>  3.7   |  25  |  1.33<H>    Ca    10.2      06 Oct 2022 12:00    TPro  7.9  /  Alb  3.4<L>  /  TBili  0.3  /  DBili  x   /  AST  19  /  ALT  18  /  AlkPhos  81  10-06    PT/INR - ( 06 Oct 2022 12:00 )   PT: 11.4 sec;   INR: 0.96 ratio         PTT - ( 06 Oct 2022 12:00 )  PTT:26.0 sec    LIVER FUNCTIONS - ( 06 Oct 2022 12:00 )  Alb: 3.4 g/dL / Pro: 7.9 g/dL / ALK PHOS: 81 U/L / ALT: 18 U/L DA / AST: 19 U/L / GGT: x           Lactate, Blood: 2.8 mmol/L (10-06-22 @ 12:00)

## 2022-10-06 NOTE — ED PROVIDER NOTE - NS ED MD DISPO ADMITTING SERVICE
[facility]  [address]  [facility] [address]  Phone:  [phone]  Fax:  [phone]                                    Gustavo Dover   MRN: [id number]    Department:  [facility]   Date of Visit:  11/13/2019           After Visit Summary Signature Page    I have received my discharge instructions, and my questions have been answered. I have discussed any challenges I see with this plan with the nurse or doctor.    ..........................................................................................................................................  Patient/Patient Representative Signature      ..........................................................................................................................................  Patient Representative Print Name and Relationship to Patient    ..................................................               ................................................  Date                                   Time    ..........................................................................................................................................  Reviewed by Signature/Title    ...................................................              ..............................................  Date                                               Time          22EPIC Rev 08/18       
MEDICINE

## 2022-10-06 NOTE — ED PROVIDER NOTE - ATTENDING APP SHARED VISIT CONTRIBUTION OF CARE
I was physically present for the E/M service provided. I agree with above history, physical, and plan which I have reviewed and edited where appropriate. I was physically present for the key portions of the service provided.    Martines: pt with Dm and had right medial gluteal abscess drained with packing 1 week ago and not improving. attempted to break up deloculation but pt abscess not able to be decompressed- will need to be admitted. no clinical evidence of taina

## 2022-10-07 DIAGNOSIS — R78.81 BACTEREMIA: ICD-10-CM

## 2022-10-07 DIAGNOSIS — Z02.9 ENCOUNTER FOR ADMINISTRATIVE EXAMINATIONS, UNSPECIFIED: ICD-10-CM

## 2022-10-07 LAB
ANION GAP SERPL CALC-SCNC: 9 MMOL/L — SIGNIFICANT CHANGE UP (ref 5–17)
BASOPHILS # BLD AUTO: 0.08 K/UL — SIGNIFICANT CHANGE UP (ref 0–0.2)
BASOPHILS NFR BLD AUTO: 1.2 % — SIGNIFICANT CHANGE UP (ref 0–2)
BUN SERPL-MCNC: 15 MG/DL — SIGNIFICANT CHANGE UP (ref 7–18)
CALCIUM SERPL-MCNC: 9.1 MG/DL — SIGNIFICANT CHANGE UP (ref 8.4–10.5)
CHLORIDE SERPL-SCNC: 103 MMOL/L — SIGNIFICANT CHANGE UP (ref 96–108)
CO2 SERPL-SCNC: 26 MMOL/L — SIGNIFICANT CHANGE UP (ref 22–31)
CREAT SERPL-MCNC: 0.95 MG/DL — SIGNIFICANT CHANGE UP (ref 0.5–1.3)
EGFR: 62 ML/MIN/1.73M2 — SIGNIFICANT CHANGE UP
EOSINOPHIL # BLD AUTO: 0.29 K/UL — SIGNIFICANT CHANGE UP (ref 0–0.5)
EOSINOPHIL NFR BLD AUTO: 4.4 % — SIGNIFICANT CHANGE UP (ref 0–6)
GLUCOSE BLDC GLUCOMTR-MCNC: 112 MG/DL — HIGH (ref 70–99)
GLUCOSE BLDC GLUCOMTR-MCNC: 118 MG/DL — HIGH (ref 70–99)
GLUCOSE BLDC GLUCOMTR-MCNC: 119 MG/DL — HIGH (ref 70–99)
GLUCOSE SERPL-MCNC: 141 MG/DL — HIGH (ref 70–99)
GRAM STN FLD: SIGNIFICANT CHANGE UP
HCT VFR BLD CALC: 38.3 % — SIGNIFICANT CHANGE UP (ref 34.5–45)
HCV AB S/CO SERPL IA: 0.09 S/CO — SIGNIFICANT CHANGE UP (ref 0–0.99)
HCV AB SERPL-IMP: SIGNIFICANT CHANGE UP
HGB BLD-MCNC: 12.4 G/DL — SIGNIFICANT CHANGE UP (ref 11.5–15.5)
IMM GRANULOCYTES NFR BLD AUTO: 0.8 % — SIGNIFICANT CHANGE UP (ref 0–0.9)
LYMPHOCYTES # BLD AUTO: 1.71 K/UL — SIGNIFICANT CHANGE UP (ref 1–3.3)
LYMPHOCYTES # BLD AUTO: 26 % — SIGNIFICANT CHANGE UP (ref 13–44)
MAGNESIUM SERPL-MCNC: 2.2 MG/DL — SIGNIFICANT CHANGE UP (ref 1.6–2.6)
MCHC RBC-ENTMCNC: 31.2 PG — SIGNIFICANT CHANGE UP (ref 27–34)
MCHC RBC-ENTMCNC: 32.4 GM/DL — SIGNIFICANT CHANGE UP (ref 32–36)
MCV RBC AUTO: 96.5 FL — SIGNIFICANT CHANGE UP (ref 80–100)
METHOD TYPE: SIGNIFICANT CHANGE UP
MONOCYTES # BLD AUTO: 0.51 K/UL — SIGNIFICANT CHANGE UP (ref 0–0.9)
MONOCYTES NFR BLD AUTO: 7.8 % — SIGNIFICANT CHANGE UP (ref 2–14)
MSSA DNA SPEC QL NAA+PROBE: SIGNIFICANT CHANGE UP
NEUTROPHILS # BLD AUTO: 3.93 K/UL — SIGNIFICANT CHANGE UP (ref 1.8–7.4)
NEUTROPHILS NFR BLD AUTO: 59.8 % — SIGNIFICANT CHANGE UP (ref 43–77)
NRBC # BLD: 0 /100 WBCS — SIGNIFICANT CHANGE UP (ref 0–0)
PHOSPHATE SERPL-MCNC: 3.7 MG/DL — SIGNIFICANT CHANGE UP (ref 2.5–4.5)
PLATELET # BLD AUTO: 329 K/UL — SIGNIFICANT CHANGE UP (ref 150–400)
POTASSIUM SERPL-MCNC: 4.1 MMOL/L — SIGNIFICANT CHANGE UP (ref 3.5–5.3)
POTASSIUM SERPL-SCNC: 4.1 MMOL/L — SIGNIFICANT CHANGE UP (ref 3.5–5.3)
RBC # BLD: 3.97 M/UL — SIGNIFICANT CHANGE UP (ref 3.8–5.2)
RBC # FLD: 13 % — SIGNIFICANT CHANGE UP (ref 10.3–14.5)
SODIUM SERPL-SCNC: 138 MMOL/L — SIGNIFICANT CHANGE UP (ref 135–145)
SPECIMEN SOURCE: SIGNIFICANT CHANGE UP
SPECIMEN SOURCE: SIGNIFICANT CHANGE UP
WBC # BLD: 6.57 K/UL — SIGNIFICANT CHANGE UP (ref 3.8–10.5)
WBC # FLD AUTO: 6.57 K/UL — SIGNIFICANT CHANGE UP (ref 3.8–10.5)

## 2022-10-07 PROCEDURE — 99231 SBSQ HOSP IP/OBS SF/LOW 25: CPT

## 2022-10-07 RX ORDER — DEXTROSE 50 % IN WATER 50 %
25 SYRINGE (ML) INTRAVENOUS ONCE
Refills: 0 | Status: DISCONTINUED | OUTPATIENT
Start: 2022-10-07 | End: 2022-11-07

## 2022-10-07 RX ORDER — CEFAZOLIN SODIUM 1 G
1000 VIAL (EA) INJECTION EVERY 8 HOURS
Refills: 0 | Status: DISCONTINUED | OUTPATIENT
Start: 2022-10-07 | End: 2022-10-09

## 2022-10-07 RX ORDER — CHLORHEXIDINE GLUCONATE 213 G/1000ML
1 SOLUTION TOPICAL
Refills: 0 | Status: DISCONTINUED | OUTPATIENT
Start: 2022-10-07 | End: 2022-11-07

## 2022-10-07 RX ORDER — DEXTROSE 50 % IN WATER 50 %
15 SYRINGE (ML) INTRAVENOUS ONCE
Refills: 0 | Status: DISCONTINUED | OUTPATIENT
Start: 2022-10-07 | End: 2022-11-07

## 2022-10-07 RX ORDER — INSULIN LISPRO 100/ML
VIAL (ML) SUBCUTANEOUS
Refills: 0 | Status: DISCONTINUED | OUTPATIENT
Start: 2022-10-07 | End: 2022-11-07

## 2022-10-07 RX ORDER — SODIUM CHLORIDE 9 MG/ML
1000 INJECTION, SOLUTION INTRAVENOUS
Refills: 0 | Status: DISCONTINUED | OUTPATIENT
Start: 2022-10-07 | End: 2022-11-07

## 2022-10-07 RX ORDER — GLUCAGON INJECTION, SOLUTION 0.5 MG/.1ML
1 INJECTION, SOLUTION SUBCUTANEOUS ONCE
Refills: 0 | Status: DISCONTINUED | OUTPATIENT
Start: 2022-10-07 | End: 2022-11-07

## 2022-10-07 RX ORDER — DEXTROSE 50 % IN WATER 50 %
12.5 SYRINGE (ML) INTRAVENOUS ONCE
Refills: 0 | Status: DISCONTINUED | OUTPATIENT
Start: 2022-10-07 | End: 2022-11-07

## 2022-10-07 RX ORDER — INSULIN LISPRO 100/ML
VIAL (ML) SUBCUTANEOUS AT BEDTIME
Refills: 0 | Status: DISCONTINUED | OUTPATIENT
Start: 2022-10-07 | End: 2022-11-07

## 2022-10-07 RX ADMIN — Medication 100 MILLIGRAM(S): at 21:34

## 2022-10-07 RX ADMIN — Medication 100 MILLIGRAM(S): at 13:19

## 2022-10-07 RX ADMIN — MORPHINE SULFATE 1 MILLIGRAM(S): 50 CAPSULE, EXTENDED RELEASE ORAL at 07:31

## 2022-10-07 RX ADMIN — Medication 650 MILLIGRAM(S): at 23:07

## 2022-10-07 RX ADMIN — MORPHINE SULFATE 1 MILLIGRAM(S): 50 CAPSULE, EXTENDED RELEASE ORAL at 06:42

## 2022-10-07 RX ADMIN — CHLORHEXIDINE GLUCONATE 1 APPLICATION(S): 213 SOLUTION TOPICAL at 12:22

## 2022-10-07 RX ADMIN — ENOXAPARIN SODIUM 40 MILLIGRAM(S): 100 INJECTION SUBCUTANEOUS at 06:42

## 2022-10-07 RX ADMIN — CEFEPIME 100 MILLIGRAM(S): 1 INJECTION, POWDER, FOR SOLUTION INTRAMUSCULAR; INTRAVENOUS at 06:42

## 2022-10-07 RX ADMIN — Medication 650 MILLIGRAM(S): at 22:15

## 2022-10-07 NOTE — PROGRESS NOTE ADULT - SUBJECTIVE AND OBJECTIVE BOX
77 yo woman s/p bedside I&D of abscess on the perineum on 10/1, complicated by MSSA bacteremia.   On exam - the I&D site is open, no pus can be expressed, surrounding induration without fluctuation.     Patient is unlikely to benefit from further surgery at this time.  Please continue daily packing  IV Abx  Will re-assess in a few days.

## 2022-10-07 NOTE — PROGRESS NOTE ADULT - ASSESSMENT
76 year old with PMH of HTN, DM is here for worsening of perineal abscess after I/D on 10/1. Admitted for further management     {[id number]} Problem/Plan - 1:  ·  Problem: Perineal abscess.   ·  Plan: perineal abscess for 7 days, s/p I/D on 10/1  Now with worsening erythema with pus drainage  Afebrile, no leukocytosis  Lactate 2.8  CT A/P showed right gluteal fold  with infiltration w/o  discrete drainable collection  s/p vanc /clindamycin and 1L bolus    Surgery Consulted: No plans for further I&D at this time  Will C/w vanc and cefepime  f/u repeat lactate  ID help appreciated.     {[id number]} Problem/Plan - 2:  ·  Problem: SERGEY (acute kidney injury).   ·  Plan: BUN/creatinine 20/1.3  s/p 1L bolus  likely pre renal 2/2 decrease PO intake   f.u repeat BMP.    {[id number]} Problem/Plan - 3:  ·  Problem: HTN (hypertension).   ·  Plan: pt takes losartan at home, unknown dose  Confirm meds in AM  DASH diet.    {[id number]} Problem/Plan - 4:  ·  Problem: Diabetes.   ·  Plan: pt takes metformin at home  Will start SS.    {[id number]} Problem/Plan - 5:  ·  Problem: DVT prophylaxis.   ·  Plan:Will start Lovenox 40mg.

## 2022-10-07 NOTE — PROGRESS NOTE ADULT - ASSESSMENT
76 year old with PMH of HTN, DM is here for worsening of perineal abscess.  Patient presented with perineal abscess and had an I/D on 10/1 in the ED and was discharged on keflex.  Patient states that she had worsening redness and  purulent discharge from abscess site.  Admitted for further management

## 2022-10-07 NOTE — PROGRESS NOTE ADULT - PROBLEM SELECTOR PLAN 7
Pt from home  f/u repeat BC  f/u TTE /TTE r/o endocarditis  Will need PICC when repeat BC negative for 4 weeks of abx  CM consulted

## 2022-10-07 NOTE — CONSULT NOTE ADULT - ASSESSMENT
Right buttock abscess - s/p I&D 10/01  MSSA bacteremia  R/o endocarditis    Plan: Continue Ancef 1g iv q8  Repeat blood cultures tomorrow morning  Patient will need TTE and VERONICA to r/o endocarditis due to MSSA bacteremia  Patient will eventually need a PICC line and 4 weeks of IV antibiotics once blood cultures are negative Right buttock abscess - s/p I&D 10/01  MSSA bacteremia  R/o endocarditis    Plan: Continue Ancef 1g iv q8  Repeat blood cultures Eliceo morning  Patient will need TTE and VERONICA to r/o endocarditis due to MSSA bacteremia  Patient will eventually need a PICC line and 4 weeks of IV antibiotics once blood cultures are negative

## 2022-10-07 NOTE — PROGRESS NOTE ADULT - PROBLEM SELECTOR PLAN 1
perineal abscess for 7 days, s/p I/D on 10/1  Now with worsening erythema with pus drainage  Afebrile, no leukocytosis  Lactate 2.8-->>1.2  CT A/P showed right gluteal fold  with infiltration w/o  discrete drainable collection  s/p vanc /clindamycin and 1L bolus    Surgery : No plans for further I&D at this time.   Continue daily packing   Continue abx per ID: Ancef 1gm IV q8h  ID Dr. Limon following  Surgery following

## 2022-10-07 NOTE — PROGRESS NOTE ADULT - PROBLEM SELECTOR PLAN 3
Will hold home regimen in setting of infection , SERGEY and  BP  currently controlled off home regimen ( lisinopril 20mg po daily, and propanolol 160mg daily)  Monitor and resume home regimen if BP persistently >140/90

## 2022-10-07 NOTE — CONSULT NOTE ADULT - SUBJECTIVE AND OBJECTIVE BOX
HPI:  76 year old with PMH of HTN, DM is here for worsening of perineal abscess.  Patient presented with perineal abscess and had an I/D on 10/1 in the ED and was discharged on keflex.  Patient states that she had worsening redness and  purulent discharge from abscess site. Patient also endorses subjective fevers, but denies any chills, nausea vomiting chest pain, SOB, abdominal pain, or change in bowel habit.     In the ED:  Afebrile, hemodynamically stable  No leukocytosis  lactate 2.5  CT A/P showed right gluteal fold  with infiltration w/o  discrete drainable collection      (06 Oct 2022 19:54)    REVIEW OF SYSTEMS:  [  ] Not able to elicit  General:	  Chest:	  GI:	  :  Skin:	  Musculoskeletal:	  Neuro:    PAST MEDICAL & SURGICAL HISTORY:  History of hypertension      Osteopenia      Gastritis      Hypercholesterolemia      Carpal tunnel syndrome        ALLERGIES: codeine (Rash)    MEDS:  acetaminophen     Tablet .. 650 milliGRAM(s) Oral every 6 hours PRN  ceFAZolin   IVPB 1000 milliGRAM(s) IV Intermittent every 8 hours  chlorhexidine 2% Cloths 1 Application(s) Topical <User Schedule>  dextrose 5%. 1000 milliLiter(s) IV Continuous <Continuous>  dextrose 5%. 1000 milliLiter(s) IV Continuous <Continuous>  dextrose 50% Injectable 25 Gram(s) IV Push once  dextrose 50% Injectable 12.5 Gram(s) IV Push once  dextrose 50% Injectable 25 Gram(s) IV Push once  dextrose Oral Gel 15 Gram(s) Oral once PRN  enoxaparin Injectable 40 milliGRAM(s) SubCutaneous every 24 hours  glucagon  Injectable 1 milliGRAM(s) IntraMuscular once  insulin lispro (ADMELOG) corrective regimen sliding scale   SubCutaneous three times a day before meals  insulin lispro (ADMELOG) corrective regimen sliding scale   SubCutaneous at bedtime  sodium chloride 0.9%. 1000 milliLiter(s) IV Continuous <Continuous>    SOCIAL HISTORY:  Smoker:      FAMILY HISTORY:    VITALS:  Vital Signs Last 24 Hrs  T(C): 36.6 (07 Oct 2022 11:30), Max: 37.1 (06 Oct 2022 20:27)  T(F): 97.8 (07 Oct 2022 11:30), Max: 98.8 (06 Oct 2022 20:27)  HR: 68 (07 Oct 2022 11:30) (53 - 68)  BP: 113/73 (07 Oct 2022 11:30) (113/73 - 133/73)  BP(mean): --  RR: 18 (07 Oct 2022 11:30) (17 - 18)  SpO2: 95% (07 Oct 2022 11:30) (94% - 100%)    Parameters below as of 07 Oct 2022 11:30  Patient On (Oxygen Delivery Method): room air          PHYSICAL EXAM:  Constitutional:  HEENT:  Neck:  Respiratory:  Cardiovascular:  Gastrointestinal:  Extremities:  Skin:  Ortho:  Neuro:      LABS/DIAGNOSTIC TESTS:                        12.4   6.57  )-----------( 329      ( 07 Oct 2022 06:30 )             38.3     WBC Count: 6.57 K/uL (10-07 @ 06:30)  WBC Count: 7.10 K/uL (10-06 @ 12:00)    10-07    138  |  103  |  15  ----------------------------<  141<H>  4.1   |  26  |  0.95    Ca    9.1      07 Oct 2022 06:30  Phos  3.7     10-07  Mg     2.2     10-07    TPro  7.9  /  Alb  3.4<L>  /  TBili  0.3  /  DBili  x   /  AST  19  /  ALT  18  /  AlkPhos  81  10-06      LIVER FUNCTIONS - ( 06 Oct 2022 12:00 )  Alb: 3.4 g/dL / Pro: 7.9 g/dL / ALK PHOS: 81 U/L / ALT: 18 U/L DA / AST: 19 U/L / GGT: x           PT/INR - ( 06 Oct 2022 12:00 )   PT: 11.4 sec;   INR: 0.96 ratio         PTT - ( 06 Oct 2022 12:00 )  PTT:26.0 sec  Lactate, Blood: 1.2 mmol/L (10-06 @ 22:20)    ABG -     CULTURES:   .Blood Blood-Peripheral  10-06 @ 12:00   Growth in anaerobic bottle: Gram Positive Cocci in Clusters  --  Blood Culture PCR      .Abscess Leg - Right  10-01 @ 13:10   Numerous Staphylococcus aureus  --  Staphylococcus aureus      Clean Catch Clean Catch (Midstream)  08-24 @ 14:03   10,000 - 49,000 CFU/mL Staphylococcus aureus  Normal Urogenital kelli present  --  Staphylococcus aureus          RADIOLOGY:  < from: CT Abdomen and Pelvis w/ IV Cont (10.06.22 @ 17:44) >    ACC: [id number] EXAM:  CT ABDOMEN AND PELVIS IC                          PROCEDURE DATE:  10/06/2022          INTERPRETATION:  CLINICAL INFORMATION: Radial abscess    COMPARISON: None.    CONTRAST/COMPLICATIONS:  IV Contrast: Omnipaque 350  90 cc administered   10 cc discarded  Oral Contrast: NONE  Complications: None reported at time of study completion    PROCEDURE:  CT of the Abdomen and Pelvis was performed.  Sagittal and coronal reformats were performed.    FINDINGS:  LOWER CHEST: Within normal limits.    LIVER: Within normal limits. The hepatic dome is not completely included   on the images  BILE DUCTS: Punctate calcification in the common bile duct at the level   of the pancreatic head on image 47 of series 2.  GALLBLADDER: Cholelithiasis.  SPLEEN: Within normal limits.  PANCREAS: Within normal limits.  ADRENALS: Within normal limits.  KIDNEYS/URETERS: Within normal limits.    BLADDER: Within normal limits.  REPRODUCTIVE ORGANS: Hysterectomy.    BOWEL: No bowel obstruction. Appendix not identified and may be   surgically absent. There is infiltration of the medial soft tissues of   the right buttock with small air bubbles seen. There is no well-defined   collection.  PERITONEUM: No ascites.  VESSELS: Atherosclerotic changes.  RETROPERITONEUM/LYMPH NODES: No lymphadenopathy.  ABDOMINAL WALL: Surgical clips in the right inguinal region.. Small   fat-containing umbilical hernia  BONES: Degenerative changes. There is a moderate levoscoliosis in the   lumbar spine. Grade 1 anterolisthesis of L4 on L5    IMPRESSION:  Infectious process in the right gluteal fold medially with infiltration   of subcutaneous fat and small air bubbles. There is no discrete drainable   collection  Incidental note is made of cholelithiasis and choledocholithiasis without   biliary ductal dilatation    --- End of Report ---            ZARA CHA MD; Attending Radiologist  This document has been electronically signed. Oct  6 2022  5:55PM    < end of copied text >   HPI:  76 year old with PMH of HTN, DM is here for worsening of perineal abscess.  Patient presented with perineal abscess and had an I/D on 10/1 in the ED and was discharged on keflex.  Patient states that she had worsening redness and  purulent discharge from abscess site. Patient also endorses subjective fevers, but denies any chills, nausea vomiting chest pain, SOB, abdominal pain, or change in bowel habit.     In the ED:  Afebrile, hemodynamically stable  No leukocytosis  lactate 2.5  CT A/P showed right gluteal fold  with infiltration w/o  discrete drainable collection      (06 Oct 2022 19:54)    History as above, patient admitted from home for worsening right buttock abscess.  Patient is s/p I&D of the right buttock on 10/1 and was discharged on Keflex, then returned for wound check on 10/3    REVIEW OF SYSTEMS:  [  ] Not able to elicit  General:	  Chest:	  GI:	  :  Skin:	  Musculoskeletal:	  Neuro:    PAST MEDICAL & SURGICAL HISTORY:  History of hypertension      Osteopenia      Gastritis      Hypercholesterolemia      Carpal tunnel syndrome        ALLERGIES: codeine (Rash)    MEDS:  acetaminophen     Tablet .. 650 milliGRAM(s) Oral every 6 hours PRN  ceFAZolin   IVPB 1000 milliGRAM(s) IV Intermittent every 8 hours  chlorhexidine 2% Cloths 1 Application(s) Topical <User Schedule>  dextrose 5%. 1000 milliLiter(s) IV Continuous <Continuous>  dextrose 5%. 1000 milliLiter(s) IV Continuous <Continuous>  dextrose 50% Injectable 25 Gram(s) IV Push once  dextrose 50% Injectable 12.5 Gram(s) IV Push once  dextrose 50% Injectable 25 Gram(s) IV Push once  dextrose Oral Gel 15 Gram(s) Oral once PRN  enoxaparin Injectable 40 milliGRAM(s) SubCutaneous every 24 hours  glucagon  Injectable 1 milliGRAM(s) IntraMuscular once  insulin lispro (ADMELOG) corrective regimen sliding scale   SubCutaneous three times a day before meals  insulin lispro (ADMELOG) corrective regimen sliding scale   SubCutaneous at bedtime  sodium chloride 0.9%. 1000 milliLiter(s) IV Continuous <Continuous>    SOCIAL HISTORY:  Smoker:      FAMILY HISTORY:    VITALS:  Vital Signs Last 24 Hrs  T(C): 36.6 (07 Oct 2022 11:30), Max: 37.1 (06 Oct 2022 20:27)  T(F): 97.8 (07 Oct 2022 11:30), Max: 98.8 (06 Oct 2022 20:27)  HR: 68 (07 Oct 2022 11:30) (53 - 68)  BP: 113/73 (07 Oct 2022 11:30) (113/73 - 133/73)  BP(mean): --  RR: 18 (07 Oct 2022 11:30) (17 - 18)  SpO2: 95% (07 Oct 2022 11:30) (94% - 100%)    Parameters below as of 07 Oct 2022 11:30  Patient On (Oxygen Delivery Method): room air          PHYSICAL EXAM:  Constitutional:  HEENT:  Neck:  Respiratory:  Cardiovascular:  Gastrointestinal:  Extremities:  Skin:  Ortho:  Neuro:      LABS/DIAGNOSTIC TESTS:                        12.4   6.57  )-----------( 329      ( 07 Oct 2022 06:30 )             38.3     WBC Count: 6.57 K/uL (10-07 @ 06:30)  WBC Count: 7.10 K/uL (10-06 @ 12:00)    10-07    138  |  103  |  15  ----------------------------<  141<H>  4.1   |  26  |  0.95    Ca    9.1      07 Oct 2022 06:30  Phos  3.7     10-07  Mg     2.2     10-07    TPro  7.9  /  Alb  3.4<L>  /  TBili  0.3  /  DBili  x   /  AST  19  /  ALT  18  /  AlkPhos  81  10-06      LIVER FUNCTIONS - ( 06 Oct 2022 12:00 )  Alb: 3.4 g/dL / Pro: 7.9 g/dL / ALK PHOS: 81 U/L / ALT: 18 U/L DA / AST: 19 U/L / GGT: x           PT/INR - ( 06 Oct 2022 12:00 )   PT: 11.4 sec;   INR: 0.96 ratio         PTT - ( 06 Oct 2022 12:00 )  PTT:26.0 sec  Lactate, Blood: 1.2 mmol/L (10-06 @ 22:20)    ABG -     CULTURES:   .Blood Blood-Peripheral  10-06 @ 12:00   Growth in anaerobic bottle: Gram Positive Cocci in Clusters  --  Blood Culture PCR      .Abscess Leg - Right  10-01 @ 13:10   Numerous Staphylococcus aureus  --  Staphylococcus aureus      Clean Catch Clean Catch (Midstream)  08-24 @ 14:03   10,000 - 49,000 CFU/mL Staphylococcus aureus  Normal Urogenital kelli present  --  Staphylococcus aureus          RADIOLOGY:  < from: CT Abdomen and Pelvis w/ IV Cont (10.06.22 @ 17:44) >    ACC: [id number] EXAM:  CT ABDOMEN AND PELVIS IC                          PROCEDURE DATE:  10/06/2022          INTERPRETATION:  CLINICAL INFORMATION: Radial abscess    COMPARISON: None.    CONTRAST/COMPLICATIONS:  IV Contrast: Omnipaque 350  90 cc administered   10 cc discarded  Oral Contrast: NONE  Complications: None reported at time of study completion    PROCEDURE:  CT of the Abdomen and Pelvis was performed.  Sagittal and coronal reformats were performed.    FINDINGS:  LOWER CHEST: Within normal limits.    LIVER: Within normal limits. The hepatic dome is not completely included   on the images  BILE DUCTS: Punctate calcification in the common bile duct at the level   of the pancreatic head on image 47 of series 2.  GALLBLADDER: Cholelithiasis.  SPLEEN: Within normal limits.  PANCREAS: Within normal limits.  ADRENALS: Within normal limits.  KIDNEYS/URETERS: Within normal limits.    BLADDER: Within normal limits.  REPRODUCTIVE ORGANS: Hysterectomy.    BOWEL: No bowel obstruction. Appendix not identified and may be   surgically absent. There is infiltration of the medial soft tissues of   the right buttock with small air bubbles seen. There is no well-defined   collection.  PERITONEUM: No ascites.  VESSELS: Atherosclerotic changes.  RETROPERITONEUM/LYMPH NODES: No lymphadenopathy.  ABDOMINAL WALL: Surgical clips in the right inguinal region.. Small   fat-containing umbilical hernia  BONES: Degenerative changes. There is a moderate levoscoliosis in the   lumbar spine. Grade 1 anterolisthesis of L4 on L5    IMPRESSION:  Infectious process in the right gluteal fold medially with infiltration   of subcutaneous fat and small air bubbles. There is no discrete drainable   collection  Incidental note is made of cholelithiasis and choledocholithiasis without   biliary ductal dilatation    --- End of Report ---            ZARA CHA MD; Attending Radiologist  This document has been electronically signed. Oct  6 2022  5:55PM    < end of copied text >   HPI:  76 year old with PMH of HTN, DM is here for worsening of perineal abscess.  Patient presented with perineal abscess and had an I/D on 10/1 in the ED and was discharged on keflex.  Patient states that she had worsening redness and  purulent discharge from abscess site. Patient also endorses subjective fevers, but denies any chills, nausea vomiting chest pain, SOB, abdominal pain, or change in bowel habit.     In the ED:  Afebrile, hemodynamically stable  No leukocytosis  lactate 2.5  CT A/P showed right gluteal fold  with infiltration w/o  discrete drainable collection      (06 Oct 2022 19:54)    History as above, patient admitted from home for worsening right buttock abscess.  Patient is s/p I&D of the right buttock on 10/1 and reports that she still noticed discharge on the abscess associated with chills at home.  Abscess cultures from 10/1 grew out staph aureus and patients blood cultures were found to be positive with MSSA in 2/4 bottles.  Additionally, staph aureus was found in her urine cultures on August of this year as well.  At present, patient was seen laying comfortably in bed with no acute distress.  Patient reports mild right buttock pain and remains afebrile.      REVIEW OF SYSTEMS:  [  ] Not able to elicit  General: Fevers at home, none at present, no chills, no malaise   Chest: no cough, no shortness of breath, no chest pain  GI: no nvd no abdominal pain  : no urinary symptoms   Skin: no rashes no cyanosis  Musculoskeletal: no trauma no LBP  Neuro: no ha's no dizziness     PAST MEDICAL & SURGICAL HISTORY:  History of hypertension      Osteopenia      Gastritis      Hypercholesterolemia      Carpal tunnel syndrome        ALLERGIES: codeine (Rash)    MEDS:  acetaminophen     Tablet .. 650 milliGRAM(s) Oral every 6 hours PRN  ceFAZolin   IVPB 1000 milliGRAM(s) IV Intermittent every 8 hours  chlorhexidine 2% Cloths 1 Application(s) Topical <User Schedule>  dextrose 5%. 1000 milliLiter(s) IV Continuous <Continuous>  dextrose 5%. 1000 milliLiter(s) IV Continuous <Continuous>  dextrose 50% Injectable 25 Gram(s) IV Push once  dextrose 50% Injectable 12.5 Gram(s) IV Push once  dextrose 50% Injectable 25 Gram(s) IV Push once  dextrose Oral Gel 15 Gram(s) Oral once PRN  enoxaparin Injectable 40 milliGRAM(s) SubCutaneous every 24 hours  glucagon  Injectable 1 milliGRAM(s) IntraMuscular once  insulin lispro (ADMELOG) corrective regimen sliding scale   SubCutaneous three times a day before meals  insulin lispro (ADMELOG) corrective regimen sliding scale   SubCutaneous at bedtime  sodium chloride 0.9%. 1000 milliLiter(s) IV Continuous <Continuous>    SOCIAL HISTORY:  Smoker:      FAMILY HISTORY:    VITALS:  Vital Signs Last 24 Hrs  T(C): 36.6 (07 Oct 2022 11:30), Max: 37.1 (06 Oct 2022 20:27)  T(F): 97.8 (07 Oct 2022 11:30), Max: 98.8 (06 Oct 2022 20:27)  HR: 68 (07 Oct 2022 11:30) (53 - 68)  BP: 113/73 (07 Oct 2022 11:30) (113/73 - 133/73)  BP(mean): --  RR: 18 (07 Oct 2022 11:30) (17 - 18)  SpO2: 95% (07 Oct 2022 11:30) (94% - 100%)    Parameters below as of 07 Oct 2022 11:30  Patient On (Oxygen Delivery Method): room air          PHYSICAL EXAM:  Constitutional: Well developed, well groomed, no distress  HEENT: normocephalic with moist oral mucosa  Neck: supple no LN's no JVD  Respiratory: normal, airway patent, breath sounds equal, clear to auscultate bilateraly, no rales, no rhonchi, no wheeze  Cardiovascular: Regular rate and rhythm, no murmurs  Gastrointestinal: +BS, normal, soft, nontender, nondistended, no rebound tenderness, no guarding, no rigidity, no organomegaly  Extremities: no edema, no cyanosis, no clubbing  Skin: Right buttock abscess -s/p I&D with area of induration.  Packing noted in wound and dressing soiled with purulent discharge  Ortho: no jt swelling  Neuro: AAO x 4        LABS/DIAGNOSTIC TESTS:                        12.4   6.57  )-----------( 329      ( 07 Oct 2022 06:30 )             38.3     WBC Count: 6.57 K/uL (10-07 @ 06:30)  WBC Count: 7.10 K/uL (10-06 @ 12:00)    10-07    138  |  103  |  15  ----------------------------<  141<H>  4.1   |  26  |  0.95    Ca    9.1      07 Oct 2022 06:30  Phos  3.7     10-07  Mg     2.2     10-07    TPro  7.9  /  Alb  3.4<L>  /  TBili  0.3  /  DBili  x   /  AST  19  /  ALT  18  /  AlkPhos  81  10-06      LIVER FUNCTIONS - ( 06 Oct 2022 12:00 )  Alb: 3.4 g/dL / Pro: 7.9 g/dL / ALK PHOS: 81 U/L / ALT: 18 U/L DA / AST: 19 U/L / GGT: x           PT/INR - ( 06 Oct 2022 12:00 )   PT: 11.4 sec;   INR: 0.96 ratio         PTT - ( 06 Oct 2022 12:00 )  PTT:26.0 sec  Lactate, Blood: 1.2 mmol/L (10-06 @ 22:20)    ABG -     CULTURES:   .Blood Blood-Peripheral  10-06 @ 12:00   Growth in anaerobic bottle: Gram Positive Cocci in Clusters  --  Blood Culture PCR      .Abscess Leg - Right  10-01 @ 13:10   Numerous Staphylococcus aureus  --  Staphylococcus aureus      Clean Catch Clean Catch (Midstream)  08-24 @ 14:03   10,000 - 49,000 CFU/mL Staphylococcus aureus  Normal Urogenital kelli present  --  Staphylococcus aureus          RADIOLOGY:  < from: CT Abdomen and Pelvis w/ IV Cont (10.06.22 @ 17:44) >    ACC: [id number] EXAM:  CT ABDOMEN AND PELVIS IC                          PROCEDURE DATE:  10/06/2022          INTERPRETATION:  CLINICAL INFORMATION: Radial abscess    COMPARISON: None.    CONTRAST/COMPLICATIONS:  IV Contrast: Omnipaque 350  90 cc administered   10 cc discarded  Oral Contrast: NONE  Complications: None reported at time of study completion    PROCEDURE:  CT of the Abdomen and Pelvis was performed.  Sagittal and coronal reformats were performed.    FINDINGS:  LOWER CHEST: Within normal limits.    LIVER: Within normal limits. The hepatic dome is not completely included   on the images  BILE DUCTS: Punctate calcification in the common bile duct at the level   of the pancreatic head on image 47 of series 2.  GALLBLADDER: Cholelithiasis.  SPLEEN: Within normal limits.  PANCREAS: Within normal limits.  ADRENALS: Within normal limits.  KIDNEYS/URETERS: Within normal limits.    BLADDER: Within normal limits.  REPRODUCTIVE ORGANS: Hysterectomy.    BOWEL: No bowel obstruction. Appendix not identified and may be   surgically absent. There is infiltration of the medial soft tissues of   the right buttock with small air bubbles seen. There is no well-defined   collection.  PERITONEUM: No ascites.  VESSELS: Atherosclerotic changes.  RETROPERITONEUM/LYMPH NODES: No lymphadenopathy.  ABDOMINAL WALL: Surgical clips in the right inguinal region.. Small   fat-containing umbilical hernia  BONES: Degenerative changes. There is a moderate levoscoliosis in the   lumbar spine. Grade 1 anterolisthesis of L4 on L5    IMPRESSION:  Infectious process in the right gluteal fold medially with infiltration   of subcutaneous fat and small air bubbles. There is no discrete drainable   collection  Incidental note is made of cholelithiasis and choledocholithiasis without   biliary ductal dilatation    --- End of Report ---            ZARA CHA MD; Attending Radiologist  This document has been electronically signed. Oct  6 2022  5:55PM    < end of copied text >

## 2022-10-07 NOTE — PROVIDER CONTACT NOTE (CRITICAL VALUE NOTIFICATION) - TEST AND RESULT REPORTED:
blood cultures   1. Anaerobic bottle gram positive cocci in pairs and   2.  Anaerobic bottle gram positive cocci in clusters from 10/6

## 2022-10-07 NOTE — PATIENT PROFILE ADULT - FALL HARM RISK - HARM RISK INTERVENTIONS

## 2022-10-07 NOTE — PROGRESS NOTE ADULT - PROBLEM SELECTOR PLAN 2
Abscess cultures from 10/1 grew out staph aureus and patients blood cultures were found to be positive with MSSA in 2/4 bottles.  Additionally, staph aureus was found in her urine cultures on August of this year as well.  Continue Ancef 1g iv q8  Repeat blood cultures Sunday morning  Patient will need TTE and VERONICA to r/o endocarditis due to MSSA bacteremia  Patient will eventually need a PICC line and 4 weeks of IV antibiotics once blood cultures are negative

## 2022-10-07 NOTE — PATIENT PROFILE ADULT - CENTRAL VENOUS CATHETER/PICC LINE
no
CAD (coronary artery disease)  Taxus stent RCA 2008  PRICE (dyspnea on exertion)    Emphysema/COPD    HLD (hyperlipidemia)    HTN (hypertension)    Ischemia  PET scan small area of mild ischemia involving the mid to apical inferolateral wall  Oct. 2016  PVD (peripheral vascular disease)  Terrence iliac stents 2006

## 2022-10-07 NOTE — PROGRESS NOTE ADULT - SUBJECTIVE AND OBJECTIVE BOX
Date of Service  : 10-07-22   INTERVAL HPI/OVERNIGHT EVENTS: feeling better.   Vital Signs Last 24 Hrs  T(C): 36.6 (07 Oct 2022 16:00), Max: 37.1 (06 Oct 2022 20:27)  T(F): 97.8 (07 Oct 2022 16:00), Max: 98.8 (06 Oct 2022 20:27)  HR: 65 (07 Oct 2022 16:00) (53 - 68)  BP: 125/73 (07 Oct 2022 16:00) (113/73 - 133/73)  BP(mean): 91 (07 Oct 2022 16:00) (91 - 91)  RR: 18 (07 Oct 2022 16:00) (17 - 18)  SpO2: 95% (07 Oct 2022 16:00) (94% - 100%)    Parameters below as of 07 Oct 2022 16:00  Patient On (Oxygen Delivery Method): room air      I&O's Summary    MEDICATIONS  (STANDING):  ceFAZolin   IVPB 1000 milliGRAM(s) IV Intermittent every 8 hours  chlorhexidine 2% Cloths 1 Application(s) Topical <User Schedule>  dextrose 5%. 1000 milliLiter(s) (50 mL/Hr) IV Continuous <Continuous>  dextrose 5%. 1000 milliLiter(s) (100 mL/Hr) IV Continuous <Continuous>  dextrose 50% Injectable 25 Gram(s) IV Push once  dextrose 50% Injectable 12.5 Gram(s) IV Push once  dextrose 50% Injectable 25 Gram(s) IV Push once  enoxaparin Injectable 40 milliGRAM(s) SubCutaneous every 24 hours  glucagon  Injectable 1 milliGRAM(s) IntraMuscular once  insulin lispro (ADMELOG) corrective regimen sliding scale   SubCutaneous three times a day before meals  insulin lispro (ADMELOG) corrective regimen sliding scale   SubCutaneous at bedtime  sodium chloride 0.9%. 1000 milliLiter(s) (80 mL/Hr) IV Continuous <Continuous>    MEDICATIONS  (PRN):  acetaminophen     Tablet .. 650 milliGRAM(s) Oral every 6 hours PRN Temp greater or equal to 38C (100.4F), Mild Pain (1 - 3)  dextrose Oral Gel 15 Gram(s) Oral once PRN Blood Glucose LESS THAN 70 milliGRAM(s)/deciliter    LABS:                        12.4   6.57  )-----------( 329      ( 07 Oct 2022 06:30 )             38.3     10-07    138  |  103  |  15  ----------------------------<  141<H>  4.1   |  26  |  0.95    Ca    9.1      07 Oct 2022 06:30  Phos  3.7     10-07  Mg     2.2     10-07    TPro  7.9  /  Alb  3.4<L>  /  TBili  0.3  /  DBili  x   /  AST  19  /  ALT  18  /  AlkPhos  81  10-06    PT/INR - ( 06 Oct 2022 12:00 )   PT: 11.4 sec;   INR: 0.96 ratio         PTT - ( 06 Oct 2022 12:00 )  PTT:26.0 sec    CAPILLARY BLOOD GLUCOSE      POCT Blood Glucose.: 118 mg/dL (07 Oct 2022 17:40)  POCT Blood Glucose.: 112 mg/dL (07 Oct 2022 12:28)          REVIEW OF SYSTEMS:  CONSTITUTIONAL: No fever, weight loss, or fatigue  EYES: No eye pain, visual disturbances, or discharge  ENMT:  No difficulty hearing, tinnitus, vertigo; No sinus or throat pain  NECK: No pain or stiffness  RESPIRATORY: No cough, wheezing, chills or hemoptysis; No shortness of breath  CARDIOVASCULAR: No chest pain, palpitations, dizziness, or leg swelling  GASTROINTESTINAL: No abdominal or epigastric pain. No nausea, vomiting, or hematemesis; No diarrhea or constipation. No melena or hematochezia.  GENITOURINARY: No dysuria, frequency, hematuria, or incontinence  NEUROLOGICAL: No headaches, memory loss, loss of strength, numbness, or tremors      Consultant(s) Notes Reviewed:  [x ] YES  [ ] NO    PHYSICAL EXAM:  GENERAL: NAD, well-groomed, well-developed,not in any distress ,  HEAD:  Atraumatic, Normocephalic  NECK: Supple, No JVD, Normal thyroid  NERVOUS SYSTEM:  Alert & Oriented X3, No focal deficit   CHEST/LUNG: Good air entry bilateral with no  rales, rhonchi, wheezing, or rubs  HEART: Regular rate and rhythm; No murmurs, rubs, or gallops  ABDOMEN: Soft, Nontender, Nondistended; Bowel sounds present  EXTREMITIES:  2+ Peripheral Pulses, No clubbing, cyanosis, or edema  SKIN: No rashes or lesions    Care Discussed with Consultants/Other Providers [ x] YES  [ ] NO

## 2022-10-08 LAB
A1C WITH ESTIMATED AVERAGE GLUCOSE RESULT: 6.4 % — HIGH (ref 4–5.6)
ALBUMIN SERPL ELPH-MCNC: 3.1 G/DL — LOW (ref 3.5–5)
ALP SERPL-CCNC: 65 U/L — SIGNIFICANT CHANGE UP (ref 40–120)
ALT FLD-CCNC: 16 U/L DA — SIGNIFICANT CHANGE UP (ref 10–60)
ANION GAP SERPL CALC-SCNC: 10 MMOL/L — SIGNIFICANT CHANGE UP (ref 5–17)
AST SERPL-CCNC: 19 U/L — SIGNIFICANT CHANGE UP (ref 10–40)
BILIRUB SERPL-MCNC: 0.2 MG/DL — SIGNIFICANT CHANGE UP (ref 0.2–1.2)
BUN SERPL-MCNC: 14 MG/DL — SIGNIFICANT CHANGE UP (ref 7–18)
CALCIUM SERPL-MCNC: 9.4 MG/DL — SIGNIFICANT CHANGE UP (ref 8.4–10.5)
CHLORIDE SERPL-SCNC: 105 MMOL/L — SIGNIFICANT CHANGE UP (ref 96–108)
CO2 SERPL-SCNC: 24 MMOL/L — SIGNIFICANT CHANGE UP (ref 22–31)
CREAT SERPL-MCNC: 0.85 MG/DL — SIGNIFICANT CHANGE UP (ref 0.5–1.3)
EGFR: 71 ML/MIN/1.73M2 — SIGNIFICANT CHANGE UP
ESTIMATED AVERAGE GLUCOSE: 137 MG/DL — HIGH (ref 68–114)
GLUCOSE BLDC GLUCOMTR-MCNC: 105 MG/DL — HIGH (ref 70–99)
GLUCOSE BLDC GLUCOMTR-MCNC: 110 MG/DL — HIGH (ref 70–99)
GLUCOSE BLDC GLUCOMTR-MCNC: 112 MG/DL — HIGH (ref 70–99)
GLUCOSE BLDC GLUCOMTR-MCNC: 115 MG/DL — HIGH (ref 70–99)
GLUCOSE SERPL-MCNC: 115 MG/DL — HIGH (ref 70–99)
HCT VFR BLD CALC: 39 % — SIGNIFICANT CHANGE UP (ref 34.5–45)
HGB BLD-MCNC: 12.7 G/DL — SIGNIFICANT CHANGE UP (ref 11.5–15.5)
MAGNESIUM SERPL-MCNC: 2.3 MG/DL — SIGNIFICANT CHANGE UP (ref 1.6–2.6)
MCHC RBC-ENTMCNC: 31.2 PG — SIGNIFICANT CHANGE UP (ref 27–34)
MCHC RBC-ENTMCNC: 32.6 GM/DL — SIGNIFICANT CHANGE UP (ref 32–36)
MCV RBC AUTO: 95.8 FL — SIGNIFICANT CHANGE UP (ref 80–100)
NRBC # BLD: 0 /100 WBCS — SIGNIFICANT CHANGE UP (ref 0–0)
PLATELET # BLD AUTO: 339 K/UL — SIGNIFICANT CHANGE UP (ref 150–400)
POTASSIUM SERPL-MCNC: 4.3 MMOL/L — SIGNIFICANT CHANGE UP (ref 3.5–5.3)
POTASSIUM SERPL-SCNC: 4.3 MMOL/L — SIGNIFICANT CHANGE UP (ref 3.5–5.3)
PROT SERPL-MCNC: 6.9 G/DL — SIGNIFICANT CHANGE UP (ref 6–8.3)
RBC # BLD: 4.07 M/UL — SIGNIFICANT CHANGE UP (ref 3.8–5.2)
RBC # FLD: 13.2 % — SIGNIFICANT CHANGE UP (ref 10.3–14.5)
SODIUM SERPL-SCNC: 139 MMOL/L — SIGNIFICANT CHANGE UP (ref 135–145)
WBC # BLD: 6.32 K/UL — SIGNIFICANT CHANGE UP (ref 3.8–10.5)
WBC # FLD AUTO: 6.32 K/UL — SIGNIFICANT CHANGE UP (ref 3.8–10.5)

## 2022-10-08 RX ORDER — MONTELUKAST 4 MG/1
10 TABLET, CHEWABLE ORAL AT BEDTIME
Refills: 0 | Status: DISCONTINUED | OUTPATIENT
Start: 2022-10-08 | End: 2022-11-07

## 2022-10-08 RX ORDER — LOSARTAN POTASSIUM 100 MG/1
TABLET, FILM COATED ORAL
Refills: 0 | Status: DISCONTINUED | OUTPATIENT
Start: 2022-10-08 | End: 2022-10-10

## 2022-10-08 RX ORDER — PROPRANOLOL HCL 160 MG
160 CAPSULE, EXTENDED RELEASE 24HR ORAL DAILY
Refills: 0 | Status: DISCONTINUED | OUTPATIENT
Start: 2022-10-08 | End: 2022-11-07

## 2022-10-08 RX ORDER — HYDROCHLOROTHIAZIDE 25 MG
25 TABLET ORAL DAILY
Refills: 0 | Status: DISCONTINUED | OUTPATIENT
Start: 2022-10-08 | End: 2022-11-07

## 2022-10-08 RX ORDER — LOSARTAN POTASSIUM 100 MG/1
1 TABLET, FILM COATED ORAL
Qty: 0 | Refills: 0 | DISCHARGE

## 2022-10-08 RX ORDER — AMLODIPINE BESYLATE 2.5 MG/1
1 TABLET ORAL
Qty: 0 | Refills: 0 | DISCHARGE

## 2022-10-08 RX ORDER — PANTOPRAZOLE SODIUM 20 MG/1
40 TABLET, DELAYED RELEASE ORAL
Refills: 0 | Status: DISCONTINUED | OUTPATIENT
Start: 2022-10-09 | End: 2022-11-07

## 2022-10-08 RX ORDER — LOSARTAN POTASSIUM 100 MG/1
100 TABLET, FILM COATED ORAL ONCE
Refills: 0 | Status: COMPLETED | OUTPATIENT
Start: 2022-10-08 | End: 2022-10-08

## 2022-10-08 RX ORDER — LOSARTAN POTASSIUM 100 MG/1
100 TABLET, FILM COATED ORAL DAILY
Refills: 0 | Status: DISCONTINUED | OUTPATIENT
Start: 2022-10-09 | End: 2022-10-10

## 2022-10-08 RX ORDER — METFORMIN HYDROCHLORIDE 850 MG/1
1 TABLET ORAL
Qty: 0 | Refills: 0 | DISCHARGE

## 2022-10-08 RX ORDER — PANTOPRAZOLE SODIUM 20 MG/1
TABLET, DELAYED RELEASE ORAL
Refills: 0 | Status: DISCONTINUED | OUTPATIENT
Start: 2022-10-08 | End: 2022-11-07

## 2022-10-08 RX ORDER — PANTOPRAZOLE SODIUM 20 MG/1
40 TABLET, DELAYED RELEASE ORAL ONCE
Refills: 0 | Status: COMPLETED | OUTPATIENT
Start: 2022-10-08 | End: 2022-10-08

## 2022-10-08 RX ORDER — GABAPENTIN 400 MG/1
100 CAPSULE ORAL
Refills: 0 | Status: DISCONTINUED | OUTPATIENT
Start: 2022-10-08 | End: 2022-11-07

## 2022-10-08 RX ADMIN — Medication 160 MILLIGRAM(S): at 23:39

## 2022-10-08 RX ADMIN — Medication 100 MILLIGRAM(S): at 05:57

## 2022-10-08 RX ADMIN — GABAPENTIN 100 MILLIGRAM(S): 400 CAPSULE ORAL at 22:36

## 2022-10-08 RX ADMIN — Medication 100 MILLIGRAM(S): at 13:14

## 2022-10-08 RX ADMIN — MONTELUKAST 10 MILLIGRAM(S): 4 TABLET, CHEWABLE ORAL at 22:40

## 2022-10-08 RX ADMIN — Medication 100 MILLIGRAM(S): at 21:12

## 2022-10-08 RX ADMIN — LOSARTAN POTASSIUM 100 MILLIGRAM(S): 100 TABLET, FILM COATED ORAL at 22:40

## 2022-10-08 RX ADMIN — CHLORHEXIDINE GLUCONATE 1 APPLICATION(S): 213 SOLUTION TOPICAL at 05:58

## 2022-10-08 RX ADMIN — ENOXAPARIN SODIUM 40 MILLIGRAM(S): 100 INJECTION SUBCUTANEOUS at 05:57

## 2022-10-08 RX ADMIN — PANTOPRAZOLE SODIUM 40 MILLIGRAM(S): 20 TABLET, DELAYED RELEASE ORAL at 22:40

## 2022-10-08 NOTE — PROGRESS NOTE ADULT - SUBJECTIVE AND OBJECTIVE BOX
Date of Service  : 10-08-22     INTERVAL HPI/OVERNIGHT EVENTS: My BP is high as do not take my BP medication.   Vital Signs Last 24 Hrs  T(C): 36.7 (08 Oct 2022 21:15), Max: 36.7 (08 Oct 2022 21:15)  T(F): 98 (08 Oct 2022 21:15), Max: 98 (08 Oct 2022 21:15)  HR: 88 (08 Oct 2022 21:15) (71 - 88)  BP: 165/107 (08 Oct 2022 21:15) (116/85 - 165/107)  BP(mean): --  RR: 18 (08 Oct 2022 21:15) (17 - 18)  SpO2: 91% (08 Oct 2022 21:15) (91% - 96%)    Parameters below as of 08 Oct 2022 21:15  Patient On (Oxygen Delivery Method): room air      I&O's Summary    MEDICATIONS  (STANDING):  ceFAZolin   IVPB 1000 milliGRAM(s) IV Intermittent every 8 hours  chlorhexidine 2% Cloths 1 Application(s) Topical <User Schedule>  dextrose 5%. 1000 milliLiter(s) (50 mL/Hr) IV Continuous <Continuous>  dextrose 5%. 1000 milliLiter(s) (100 mL/Hr) IV Continuous <Continuous>  dextrose 50% Injectable 25 Gram(s) IV Push once  dextrose 50% Injectable 12.5 Gram(s) IV Push once  dextrose 50% Injectable 25 Gram(s) IV Push once  enoxaparin Injectable 40 milliGRAM(s) SubCutaneous every 24 hours  gabapentin 100 milliGRAM(s) Oral two times a day  glucagon  Injectable 1 milliGRAM(s) IntraMuscular once  hydrochlorothiazide 25 milliGRAM(s) Oral daily  insulin lispro (ADMELOG) corrective regimen sliding scale   SubCutaneous three times a day before meals  insulin lispro (ADMELOG) corrective regimen sliding scale   SubCutaneous at bedtime  losartan      montelukast 10 milliGRAM(s) Oral at bedtime  pantoprazole    Tablet      propranolol 80 milliGRAM(s) Oral once  propranolol      sodium chloride 0.9%. 1000 milliLiter(s) (80 mL/Hr) IV Continuous <Continuous>    MEDICATIONS  (PRN):  acetaminophen     Tablet .. 650 milliGRAM(s) Oral every 6 hours PRN Temp greater or equal to 38C (100.4F), Mild Pain (1 - 3)  dextrose Oral Gel 15 Gram(s) Oral once PRN Blood Glucose LESS THAN 70 milliGRAM(s)/deciliter    LABS:                        12.7   6.32  )-----------( 339      ( 08 Oct 2022 07:03 )             39.0     10-08    139  |  105  |  14  ----------------------------<  115<H>  4.3   |  24  |  0.85    Ca    9.4      08 Oct 2022 07:03  Phos  3.7     10-07  Mg     2.3     10-08    TPro  6.9  /  Alb  3.1<L>  /  TBili  0.2  /  DBili  x   /  AST  19  /  ALT  16  /  AlkPhos  65  10-08        CAPILLARY BLOOD GLUCOSE      POCT Blood Glucose.: 115 mg/dL (08 Oct 2022 21:27)  POCT Blood Glucose.: 105 mg/dL (08 Oct 2022 16:23)  POCT Blood Glucose.: 110 mg/dL (08 Oct 2022 11:16)  POCT Blood Glucose.: 112 mg/dL (08 Oct 2022 08:08)          REVIEW OF SYSTEMS:  CONSTITUTIONAL: No fever, weight loss, or fatigue  EYES: No eye pain, visual disturbances, or discharge  ENMT:  No difficulty hearing, tinnitus, vertigo; No sinus or throat pain  NECK: No pain or stiffness  RESPIRATORY: No cough, wheezing, chills or hemoptysis; No shortness of breath  CARDIOVASCULAR: No chest pain, palpitations, dizziness, or leg swelling  GASTROINTESTINAL: No abdominal or epigastric pain. No nausea, vomiting, or hematemesis; No diarrhea or constipation. No melena or hematochezia.  GENITOURINARY: No dysuria, frequency, hematuria, or incontinence  NEUROLOGICAL: No headaches, memory loss, loss of strength, numbness, or tremors      Consultant(s) Notes Reviewed:  [x ] YES  [ ] NO    PHYSICAL EXAM:  GENERAL: NAD, well-groomed, well-developed, not in any distress ,  HEAD:  Atraumatic, Normocephalic  EYES: EOMI, PERRLA, conjunctiva and sclera clear  ENMT: No tonsillar erythema, exudates, or enlargement; Moist mucous membranes, Good dentition, No lesions  NECK: Supple, No JVD, Normal thyroid  NERVOUS SYSTEM:  Alert & Oriented X3, No focal deficit   CHEST/LUNG: Good air entry bilateral with no  rales, rhonchi, wheezing, or rubs  HEART: Regular rate and rhythm; No murmurs, rubs, or gallops  ABDOMEN: Soft, Nontender, Nondistended; Bowel sounds present  EXTREMITIES:  2+ Peripheral Pulses, No clubbing, cyanosis, or edema  SKIN: No rashes or lesions    Care Discussed with Consultants/Other Providers [ x] YES  [ ] NO

## 2022-10-08 NOTE — PROGRESS NOTE ADULT - ASSESSMENT
76 year old with PMH of HTN, DM is here for worsening of perineal abscess after I/D on 10/1. Admitted for further management     {[id number]} Problem/Plan - 1:  ·  Problem: Perineal abscess.   ·  Plan: perineal abscess for 7 days, s/p I/D on 10/1  Now with worsening erythema with pus drainage  Afebrile, no leukocytosis  Lactate 2.8  CT A/P showed right gluteal fold  with infiltration w/o  discrete drainable collection  s/p vanc /clindamycin and 1L bolus    Surgery Consulted: No plans for further I&D at this time  Will C/w vanc and cefepime  f/u repeat lactate  ID help appreciated.     {[id number]} Problem/Plan - 2:  ·  Problem: SERGEY (acute kidney injury).   ·  Plan: BUN/creatinine 20/1.3  s/p 1L bolus  likely pre renal 2/2 decrease PO intake   f.u repeat BMP.    {[id number]} Problem/Plan - 3:  ·  Problem: HTN (hypertension).   ·  Plan: Restarting her home meds.   DASH diet.    {[id number]} Problem/Plan - 4:  ·  Problem: Diabetes.   ·  Plan: pt takes metformin at home  Will start SS.    {[id number]} Problem/Plan - 5:  ·  Problem: DVT prophylaxis.   ·  Plan:Will start Lovenox 40mg.    Dispo : DC planning pending ID clearance .

## 2022-10-08 NOTE — CHART NOTE - NSCHARTNOTEFT_GEN_A_CORE
EVENT: Elevated BP trend 165/107    BRIEF HPI: 76 year old with PMH of HTN, DM is here for worsening of perineal abscess.  Pt presented with perineal abscess and had an I/D on 10/1 in the ED and was discharged on keflex.  Patient had worsening redness and  purulent discharge from abscess site. Pt is s/p I&D of the right buttock on 10/1 and reports that she still noticed discharge on the abscess associated with chills at home. Abscess cultures from 10/1 grew out staph aureus and patients blood cultures were found to be positive with MSSA in 2/4 bottles.  Additionally, staph aureus was found in her urine cultures on August of this year as well. TTE and VERONICA to r/o endocarditis due to MSSA bacteremia is pending. Patient will eventually need a PICC line and 4 weeks of IV antibiotics once blood cultures are negative.    OBJECTIVE:  Vital Signs Last 24 Hrs  T(C): 36.7 (08 Oct 2022 21:15), Max: 36.7 (08 Oct 2022 21:15)  T(F): 98 (08 Oct 2022 21:15), Max: 98 (08 Oct 2022 21:15)  HR: 88 (08 Oct 2022 21:15) (71 - 88)  BP: 165/107 (08 Oct 2022 21:15) (116/85 - 165/107)  BP(mean): --  RR: 18 (08 Oct 2022 21:15) (17 - 18)  SpO2: 91% (08 Oct 2022 21:15) (91% - 96%)    Parameters below as of 08 Oct 2022 21:15  Patient On (Oxygen Delivery Method): room air    FOCUSED PHYSICAL EXAM:  CV: S1 S2  NEURO: Alert, oriented X4  RESP: Even, unlabored  SKIN: Right buttock abscess -S/p I&D with area of induration.      LABS:                        12.7   6.32  )-----------( 339      ( 08 Oct 2022 07:03 )             39.0     10-08    139  |  105  |  14  ----------------------------<  115<H>  4.3   |  24  |  0.85    Ca    9.4      08 Oct 2022 07:03  Phos  3.7     10-07  Mg     2.3     10-08    TPro  6.9  /  Alb  3.1<L>  /  TBili  0.2  /  DBili  x   /  AST  19  /  ALT  16  /  AlkPhos  65  10-08    Home Medications: Pt recalling meds from memory. Relative to bring listing from home  Fosamax 70 mg oral tablet: 1 tab(s) orally once a week (16 Nov 2017 12:14)  hydrochlorothiazide 25 mg oral tablet: 1 tab(s) orally once a day (08 Oct 2022 21:23)  lisinopril 20 mg oral tablet: 1 tab(s) orally once a day (16 Nov 2017 12:14)  losartan 100 mg oral tablet: 1 tab(s) orally once a day (08 Oct 2022 21:22)  pantoprazole 40 mg oral delayed release tablet: 1 tab(s) orally once a day (16 Nov 2017 12:14)  propranolol 160 mg oral capsule, extended release: 1 cap(s) orally once a day (16 Nov 2017 12:14)  simvastatin 10 mg oral tablet: 1 tab(s) orally once a day (at bedtime) (16 Nov 2017 12:14)  Singulair 10 mg oral tablet: 1 tab(s) orally once a day (in the evening) (16 Nov 2017 12:14)    IMAGING:  ACC: [id number] EXAM:  CT ABDOMEN AND PELVIS IC                        PROCEDURE DATE:  10/06/2022    IMPRESSION:  Infectious process in the right gluteal fold medially with infiltration   of subcutaneous fat and small air bubbles. There is no discrete drainable   collection  Incidental note is made of cholelithiasis and choledocholithiasis without   biliary ductal dilatation    PROBLEM: Elevated BP probably due to missed med dose  PLAN:     MEDICATIONS  (STANDING):  1. Losartan 100 leonard GRAM(s) Oral once now then daily  2. Propranolol 80 leonard GRAM(s) Oral once now the 80 mg twice a day  3. Hydrochlorothiazide 25 leonard GRAM(s) Oral daily    FOLLOW UP / RESULT: BP trend EVENT: Elevated BP trend 165/107    BRIEF HPI: 76 year old with PMH of HTN, DM is here for worsening of perineal abscess.  Pt presented with perineal abscess and had an I/D on 10/1 in the ED and was discharged on keflex.  Patient had worsening redness and  purulent discharge from abscess site. Pt is s/p I&D of the right buttock on 10/1 and reports that she still noticed discharge on the abscess associated with chills at home. Abscess cultures from 10/1 grew out staph aureus and patients blood cultures were found to be positive with MSSA in 2/4 bottles.  Additionally, staph aureus was found in her urine cultures on August of this year as well. TTE and VERONICA to r/o endocarditis due to MSSA bacteremia is pending. Patient will eventually need a PICC line and 4 weeks of IV antibiotics once blood cultures are negative.    OBJECTIVE:  Vital Signs Last 24 Hrs  T(C): 36.7 (08 Oct 2022 21:15), Max: 36.7 (08 Oct 2022 21:15)  T(F): 98 (08 Oct 2022 21:15), Max: 98 (08 Oct 2022 21:15)  HR: 88 (08 Oct 2022 21:15) (71 - 88)  BP: 165/107 (08 Oct 2022 21:15) (116/85 - 165/107)  BP(mean): --  RR: 18 (08 Oct 2022 21:15) (17 - 18)  SpO2: 91% (08 Oct 2022 21:15) (91% - 96%)    Parameters below as of 08 Oct 2022 21:15  Patient On (Oxygen Delivery Method): room air    FOCUSED PHYSICAL EXAM:  CV: S1 S2  NEURO: Alert, oriented X4  RESP: Even, unlabored  SKIN: Right buttock abscess -S/p I&D with area of induration.      LABS:                        12.7   6.32  )-----------( 339      ( 08 Oct 2022 07:03 )             39.0     10-08    139  |  105  |  14  ----------------------------<  115<H>  4.3   |  24  |  0.85    Ca    9.4      08 Oct 2022 07:03  Phos  3.7     10-07  Mg     2.3     10-08    TPro  6.9  /  Alb  3.1<L>  /  TBili  0.2  /  DBili  x   /  AST  19  /  ALT  16  /  AlkPhos  65  10-08    Home Medications: Pt recalling meds from memory. Relative to bring listing from home  Fosamax 70 mg oral tablet: 1 tab(s) orally once a week (16 Nov 2017 12:14)  hydrochlorothiazide 25 mg oral tablet: 1 tab(s) orally once a day (08 Oct 2022 21:23)  lisinopril 20 mg oral tablet: 1 tab(s) orally once a day (16 Nov 2017 12:14)  losartan 100 mg oral tablet: 1 tab(s) orally once a day (08 Oct 2022 21:22)  pantoprazole 40 mg oral delayed release tablet: 1 tab(s) orally once a day (16 Nov 2017 12:14)  propranolol 160 mg oral capsule, extended release: 1 cap(s) orally once a day (16 Nov 2017 12:14)  simvastatin 10 mg oral tablet: 1 tab(s) orally once a day (at bedtime) (16 Nov 2017 12:14)  Singulair 10 mg oral tablet: 1 tab(s) orally once a day (in the evening) (16 Nov 2017 12:14)    IMAGING:  ACC: [id number] EXAM:  CT ABDOMEN AND PELVIS IC                        PROCEDURE DATE:  10/06/2022    IMPRESSION:  Infectious process in the right gluteal fold medially with infiltration   of subcutaneous fat and small air bubbles. There is no discrete drainable   collection  Incidental note is made of cholelithiasis and choledocholithiasis without   biliary ductal dilatation    PROBLEM: Elevated BP probably due to missed med dose  PLAN:   1. Losartan 100 leonard GRAM(s) Oral once now then daily  2. Propranolol 80 leonard GRAM(s) Oral once now the 80 mg twice a day  3. Hydrochlorothiazide 25 leonard GRAM(s) Oral daily    FOLLOW UP / RESULT: BP trend    Discussed with Dr Haskins EVENT: Elevated BP trend 165/107    BRIEF HPI: 76 year old with PMH of HTN, DM is here for worsening of perineal abscess.  Pt presented with perineal abscess and had an I/D on 10/1 in the ED and was discharged on keflex.  Patient had worsening redness and  purulent discharge from abscess site. Pt is s/p I&D of the right buttock on 10/1 and reports that she still noticed discharge on the abscess associated with chills at home. Abscess cultures from 10/1 grew out staph aureus and patients blood cultures were found to be positive with MSSA in 2/4 bottles.  Additionally, staph aureus was found in her urine cultures on August of this year as well. TTE and VERONICA to r/o endocarditis due to MSSA bacteremia is pending. Patient will eventually need a PICC line and 4 weeks of IV antibiotics once blood cultures are negative.    OBJECTIVE:  Vital Signs Last 24 Hrs  T(C): 36.7 (08 Oct 2022 21:15), Max: 36.7 (08 Oct 2022 21:15)  T(F): 98 (08 Oct 2022 21:15), Max: 98 (08 Oct 2022 21:15)  HR: 88 (08 Oct 2022 21:15) (71 - 88)  BP: 165/107 (08 Oct 2022 21:15) (116/85 - 165/107)  BP(mean): --  RR: 18 (08 Oct 2022 21:15) (17 - 18)  SpO2: 91% (08 Oct 2022 21:15) (91% - 96%)    Parameters below as of 08 Oct 2022 21:15  Patient On (Oxygen Delivery Method): room air    FOCUSED PHYSICAL EXAM:  CV: S1 S2  NEURO: Alert, oriented X4  RESP: Even, unlabored  SKIN: Right buttock abscess -S/p I&D with area of induration.      LABS:                        12.7   6.32  )-----------( 339      ( 08 Oct 2022 07:03 )             39.0     10-08    139  |  105  |  14  ----------------------------<  115<H>  4.3   |  24  |  0.85    Ca    9.4      08 Oct 2022 07:03  Phos  3.7     10-07  Mg     2.3     10-08    TPro  6.9  /  Alb  3.1<L>  /  TBili  0.2  /  DBili  x   /  AST  19  /  ALT  16  /  AlkPhos  65  10-08    Home Medications: Pt recalling meds from memory. Relative to bring listing from home  Fosamax 70 mg oral tablet: 1 tab(s) orally once a week (16 Nov 2017 12:14)  hydrochlorothiazide 25 mg oral tablet: 1 tab(s) orally once a day (08 Oct 2022 21:23)  lisinopril 20 mg oral tablet: 1 tab(s) orally once a day (16 Nov 2017 12:14)  losartan 100 mg oral tablet: 1 tab(s) orally once a day (08 Oct 2022 21:22)  pantoprazole 40 mg oral delayed release tablet: 1 tab(s) orally once a day (16 Nov 2017 12:14)  propranolol 160 mg oral capsule, extended release: 1 cap(s) orally once a day (16 Nov 2017 12:14)  simvastatin 10 mg oral tablet: 1 tab(s) orally once a day (at bedtime) (16 Nov 2017 12:14)  Singulair 10 mg oral tablet: 1 tab(s) orally once a day (in the evening) (16 Nov 2017 12:14)    IMAGING:  ACC: [id number] EXAM:  CT ABDOMEN AND PELVIS IC                        PROCEDURE DATE:  10/06/2022    IMPRESSION:  Infectious process in the right gluteal fold medially with infiltration   of subcutaneous fat and small air bubbles. There is no discrete drainable   collection  Incidental note is made of cholelithiasis and choledocholithiasis without   biliary ductal dilatation    PROBLEM: Elevated BP probably due to missed med dose  PLAN:   1. Losartan 100 leonard GRAM(s) Oral once now then daily  2. Propranolol   leonard GRAM(s) Oral daily  3. Hydrochlorothiazide 25 leonard GRAM(s) Oral daily    FOLLOW UP / RESULT: BP trend    Discussed with Dr Haskins

## 2022-10-09 LAB
-  AMPICILLIN/SULBACTAM: SIGNIFICANT CHANGE UP
-  CEFAZOLIN: SIGNIFICANT CHANGE UP
-  CLINDAMYCIN: SIGNIFICANT CHANGE UP
-  ERYTHROMYCIN: SIGNIFICANT CHANGE UP
-  GENTAMICIN: SIGNIFICANT CHANGE UP
-  OXACILLIN: SIGNIFICANT CHANGE UP
-  PENICILLIN: SIGNIFICANT CHANGE UP
-  RIFAMPIN: SIGNIFICANT CHANGE UP
-  TETRACYCLINE: SIGNIFICANT CHANGE UP
-  TRIMETHOPRIM/SULFAMETHOXAZOLE: SIGNIFICANT CHANGE UP
-  VANCOMYCIN: SIGNIFICANT CHANGE UP
ALBUMIN SERPL ELPH-MCNC: 3 G/DL — LOW (ref 3.5–5)
ALP SERPL-CCNC: 69 U/L — SIGNIFICANT CHANGE UP (ref 40–120)
ALT FLD-CCNC: 17 U/L DA — SIGNIFICANT CHANGE UP (ref 10–60)
ANION GAP SERPL CALC-SCNC: 7 MMOL/L — SIGNIFICANT CHANGE UP (ref 5–17)
AST SERPL-CCNC: 18 U/L — SIGNIFICANT CHANGE UP (ref 10–40)
BILIRUB SERPL-MCNC: 0.3 MG/DL — SIGNIFICANT CHANGE UP (ref 0.2–1.2)
BUN SERPL-MCNC: 15 MG/DL — SIGNIFICANT CHANGE UP (ref 7–18)
CALCIUM SERPL-MCNC: 9.5 MG/DL — SIGNIFICANT CHANGE UP (ref 8.4–10.5)
CHLORIDE SERPL-SCNC: 105 MMOL/L — SIGNIFICANT CHANGE UP (ref 96–108)
CO2 SERPL-SCNC: 23 MMOL/L — SIGNIFICANT CHANGE UP (ref 22–31)
CREAT SERPL-MCNC: 0.76 MG/DL — SIGNIFICANT CHANGE UP (ref 0.5–1.3)
CULTURE RESULTS: SIGNIFICANT CHANGE UP
CULTURE RESULTS: SIGNIFICANT CHANGE UP
EGFR: 81 ML/MIN/1.73M2 — SIGNIFICANT CHANGE UP
GLUCOSE BLDC GLUCOMTR-MCNC: 105 MG/DL — HIGH (ref 70–99)
GLUCOSE BLDC GLUCOMTR-MCNC: 109 MG/DL — HIGH (ref 70–99)
GLUCOSE BLDC GLUCOMTR-MCNC: 113 MG/DL — HIGH (ref 70–99)
GLUCOSE BLDC GLUCOMTR-MCNC: 149 MG/DL — HIGH (ref 70–99)
GLUCOSE SERPL-MCNC: 117 MG/DL — HIGH (ref 70–99)
HCT VFR BLD CALC: 39.4 % — SIGNIFICANT CHANGE UP (ref 34.5–45)
HGB BLD-MCNC: 13 G/DL — SIGNIFICANT CHANGE UP (ref 11.5–15.5)
MCHC RBC-ENTMCNC: 31.5 PG — SIGNIFICANT CHANGE UP (ref 27–34)
MCHC RBC-ENTMCNC: 33 GM/DL — SIGNIFICANT CHANGE UP (ref 32–36)
MCV RBC AUTO: 95.4 FL — SIGNIFICANT CHANGE UP (ref 80–100)
METHOD TYPE: SIGNIFICANT CHANGE UP
MRSA PCR RESULT.: SIGNIFICANT CHANGE UP
NRBC # BLD: 0 /100 WBCS — SIGNIFICANT CHANGE UP (ref 0–0)
ORGANISM # SPEC MICROSCOPIC CNT: SIGNIFICANT CHANGE UP
PLATELET # BLD AUTO: 373 K/UL — SIGNIFICANT CHANGE UP (ref 150–400)
POTASSIUM SERPL-MCNC: 4 MMOL/L — SIGNIFICANT CHANGE UP (ref 3.5–5.3)
POTASSIUM SERPL-SCNC: 4 MMOL/L — SIGNIFICANT CHANGE UP (ref 3.5–5.3)
PROT SERPL-MCNC: 7 G/DL — SIGNIFICANT CHANGE UP (ref 6–8.3)
RBC # BLD: 4.13 M/UL — SIGNIFICANT CHANGE UP (ref 3.8–5.2)
RBC # FLD: 13.1 % — SIGNIFICANT CHANGE UP (ref 10.3–14.5)
S AUREUS DNA NOSE QL NAA+PROBE: SIGNIFICANT CHANGE UP
SODIUM SERPL-SCNC: 135 MMOL/L — SIGNIFICANT CHANGE UP (ref 135–145)
SPECIMEN SOURCE: SIGNIFICANT CHANGE UP
SPECIMEN SOURCE: SIGNIFICANT CHANGE UP
WBC # BLD: 7.24 K/UL — SIGNIFICANT CHANGE UP (ref 3.8–10.5)
WBC # FLD AUTO: 7.24 K/UL — SIGNIFICANT CHANGE UP (ref 3.8–10.5)

## 2022-10-09 RX ORDER — CEFAZOLIN SODIUM 1 G
2000 VIAL (EA) INJECTION EVERY 8 HOURS
Refills: 0 | Status: DISCONTINUED | OUTPATIENT
Start: 2022-10-09 | End: 2022-10-12

## 2022-10-09 RX ADMIN — Medication 100 MILLIGRAM(S): at 13:20

## 2022-10-09 RX ADMIN — Medication 650 MILLIGRAM(S): at 20:22

## 2022-10-09 RX ADMIN — Medication 25 MILLIGRAM(S): at 05:53

## 2022-10-09 RX ADMIN — ENOXAPARIN SODIUM 40 MILLIGRAM(S): 100 INJECTION SUBCUTANEOUS at 05:54

## 2022-10-09 RX ADMIN — MONTELUKAST 10 MILLIGRAM(S): 4 TABLET, CHEWABLE ORAL at 21:25

## 2022-10-09 RX ADMIN — PANTOPRAZOLE SODIUM 40 MILLIGRAM(S): 20 TABLET, DELAYED RELEASE ORAL at 06:50

## 2022-10-09 RX ADMIN — GABAPENTIN 100 MILLIGRAM(S): 400 CAPSULE ORAL at 05:53

## 2022-10-09 RX ADMIN — CHLORHEXIDINE GLUCONATE 1 APPLICATION(S): 213 SOLUTION TOPICAL at 05:54

## 2022-10-09 RX ADMIN — Medication 650 MILLIGRAM(S): at 19:22

## 2022-10-09 RX ADMIN — Medication 160 MILLIGRAM(S): at 06:49

## 2022-10-09 RX ADMIN — Medication 100 MILLIGRAM(S): at 21:25

## 2022-10-09 RX ADMIN — LOSARTAN POTASSIUM 100 MILLIGRAM(S): 100 TABLET, FILM COATED ORAL at 06:49

## 2022-10-09 RX ADMIN — Medication 100 MILLIGRAM(S): at 05:54

## 2022-10-09 RX ADMIN — GABAPENTIN 100 MILLIGRAM(S): 400 CAPSULE ORAL at 17:19

## 2022-10-09 NOTE — PHARMACOTHERAPY INTERVENTION NOTE - COMMENTS
As per policy, adjusted cefazolin dose from 1g IV q8h to 2g IV q8h since the higher dose is recommended for the treatment of MSSA bacteremia.     Christiano Martinez, PharmD, [facility]  Clinical Pharmacy Specialist, Infectious Diseases  Tele-Antimicrobial Stewardship Program (Tele-ASP)  Tele-ASP Phone: [phone]

## 2022-10-09 NOTE — PROGRESS NOTE ADULT - ASSESSMENT
Right buttock abscess - s/p I&D 10/01  MSSA bacteremia  R/o endocarditis    Plan:   ·	continue Ancef 1g iv q8h   ·	awaiting repeat BC results   ·	Patient will need TTE (ordered) and VERONICA to r/o endocarditis due to MSSA bacteremia  ·	Patient will eventually need a PICC line and 4 weeks of IV antibiotics once blood cultures are negative.

## 2022-10-09 NOTE — PROGRESS NOTE ADULT - ASSESSMENT
76 year old with PMH of HTN, DM is here for worsening of perineal abscess after I/D on 10/1. Admitted for further management     {[id number]} Problem/Plan - 1:  ·  Problem: Perineal abscess.   ·  Plan: perineal abscess for 7 days, s/p I/D on 10/1  Now with worsening erythema with pus drainage  Afebrile, no leukocytosis  Lactate 2.8  CT A/P showed right gluteal fold  with infiltration w/o  discrete drainable collection  s/p vanc /clindamycin and 1L bolus    Surgery Consulted: No plans for further I&D at this time  On Ancef per ID . Awaiting PICC line.   f/u repeat lactate  ID help appreciated.     {[id number]} Problem/Plan - 2:  ·  Problem: SERGEY (acute kidney injury).   ·  Plan: Resolved.     {[id number]} Problem/Plan - 3:  ·  Problem: HTN (hypertension).   ·  Plan: Restarting her home meds.   DASH diet.    {[id number]} Problem/Plan - 4:  ·  Problem: Diabetes.   ·  Plan: pt takes metformin at home  Will start SS.    {[id number]} Problem/Plan - 5:  ·  Problem: DVT prophylaxis.   ·  Plan:Will start Lovenox 40mg.    Dispo : DC planning pending ID clearance .

## 2022-10-09 NOTE — PROGRESS NOTE ADULT - SUBJECTIVE AND OBJECTIVE BOX
76y Female is under our care for right buttock abscess (s/p I&D) and MSSA bacteremia. Patient is doing better and reports that she is able to sit on chair now. Repeat BC were done today and are testing. Awaiting to have TTE.     MEDS:  ceFAZolin   IVPB 1000 milliGRAM(s) IV Intermittent every 8 hours    ALLERGIES: Allergies    codeine (Rash)    Intolerances    REVIEW OF SYSTEMS:  [  ] Not able to illicit  General: no fevers no malaise  Chest: no cough no sob  GI: no nvd  : no urinary sxs +improved buttock pain  Skin: no rashes  Musculoskeletal: no trauma no LBP  Neuro: no ha's no dizziness     VITALS:  Vital Signs Last 24 Hrs  T(C): 36.4 (09 Oct 2022 13:47), Max: 36.7 (08 Oct 2022 21:15)  T(F): 97.6 (09 Oct 2022 13:47), Max: 98 (08 Oct 2022 21:15)  HR: 93 (09 Oct 2022 13:47) (75 - 93)  BP: 126/89 (09 Oct 2022 13:47) (126/89 - 165/107)  BP(mean): --  RR: 18 (09 Oct 2022 13:47) (18 - 19)  SpO2: 94% (09 Oct 2022 13:47) (91% - 95%)    Parameters below as of 09 Oct 2022 13:47  Patient On (Oxygen Delivery Method): room air    PHYSICAL EXAM:  HEENT: n/a  Neck: supple no LN's   Respiratory: lungs clear no rales  Cardiovascular: S1 S2 reg no murmurs  Gastrointestinal: +BS with soft, nondistended abdomen; nontender  : right buttock abscess with lingering induration but tenderness is less, no pus was expressed  Extremities: no edema  Skin: no rashes  Ortho: n/a  Neuro: AAO x 3    LABS/DIAGNOSTIC TESTS:                        13.0   7.24  )-----------( 373      ( 09 Oct 2022 06:02 )             39.4     WBC Count: 7.24 K/uL (10-09 @ 06:02)  WBC Count: 6.32 K/uL (10-08 @ 07:03)  WBC Count: 6.57 K/uL (10-07 @ 06:30)  WBC Count: 7.10 K/uL (10-06 @ 12:00)    10-09    135  |  105  |  15  ----------------------------<  117<H>  4.0   |  23  |  0.76    Ca    9.5      09 Oct 2022 06:02  Mg     2.3     10-08    TPro  7.0  /  Alb  3.0<L>  /  TBili  0.3  /  DBili  x   /  AST  18  /  ALT  17  /  AlkPhos  69  10-09      CULTURES:   10/9 BC - pending     .Blood Blood-Peripheral  10-06 @ 12:00   Growth in aerobic and anaerobic bottles: Staphylococcus aureus  See previous culture  [id number]  --  Blood Culture PCR  Staphylococcus aureus      .Abscess Leg - Right  10-01 @ 13:10   Numerous Staphylococcus aureus  --  Staphylococcus aureus      Clean Catch Clean Catch (Midstream)  08-24 @ 14:03   10,000 - 49,000 CFU/mL Staphylococcus aureus  Normal Urogenital kelli present  --  Staphylococcus aureus        RADIOLOGY:  no new studies

## 2022-10-09 NOTE — PROGRESS NOTE ADULT - SUBJECTIVE AND OBJECTIVE BOX
Date of Service  : 10-09-22     INTERVAL HPI/OVERNIGHT EVENTS: I feel much better.   Vital Signs Last 24 Hrs  T(C): 36.4 (09 Oct 2022 13:47), Max: 36.7 (08 Oct 2022 21:15)  T(F): 97.6 (09 Oct 2022 13:47), Max: 98 (08 Oct 2022 21:15)  HR: 93 (09 Oct 2022 13:47) (75 - 93)  BP: 126/89 (09 Oct 2022 13:47) (126/89 - 165/107)  BP(mean): --  RR: 18 (09 Oct 2022 13:47) (18 - 19)  SpO2: 94% (09 Oct 2022 13:47) (91% - 95%)    Parameters below as of 09 Oct 2022 13:47  Patient On (Oxygen Delivery Method): room air      I&O's Summary    MEDICATIONS  (STANDING):  ceFAZolin   IVPB 1000 milliGRAM(s) IV Intermittent every 8 hours  chlorhexidine 2% Cloths 1 Application(s) Topical <User Schedule>  dextrose 5%. 1000 milliLiter(s) (100 mL/Hr) IV Continuous <Continuous>  dextrose 5%. 1000 milliLiter(s) (50 mL/Hr) IV Continuous <Continuous>  dextrose 50% Injectable 25 Gram(s) IV Push once  dextrose 50% Injectable 12.5 Gram(s) IV Push once  dextrose 50% Injectable 25 Gram(s) IV Push once  enoxaparin Injectable 40 milliGRAM(s) SubCutaneous every 24 hours  gabapentin 100 milliGRAM(s) Oral two times a day  glucagon  Injectable 1 milliGRAM(s) IntraMuscular once  hydrochlorothiazide 25 milliGRAM(s) Oral daily  insulin lispro (ADMELOG) corrective regimen sliding scale   SubCutaneous three times a day before meals  insulin lispro (ADMELOG) corrective regimen sliding scale   SubCutaneous at bedtime  losartan 100 milliGRAM(s) Oral daily  losartan      montelukast 10 milliGRAM(s) Oral at bedtime  pantoprazole    Tablet      pantoprazole    Tablet 40 milliGRAM(s) Oral before breakfast  propranolol  milliGRAM(s) Oral daily  sodium chloride 0.9%. 1000 milliLiter(s) (80 mL/Hr) IV Continuous <Continuous>    MEDICATIONS  (PRN):  acetaminophen     Tablet .. 650 milliGRAM(s) Oral every 6 hours PRN Temp greater or equal to 38C (100.4F), Mild Pain (1 - 3)  dextrose Oral Gel 15 Gram(s) Oral once PRN Blood Glucose LESS THAN 70 milliGRAM(s)/deciliter    LABS:                        13.0   7.24  )-----------( 373      ( 09 Oct 2022 06:02 )             39.4     10-09    135  |  105  |  15  ----------------------------<  117<H>  4.0   |  23  |  0.76    Ca    9.5      09 Oct 2022 06:02  Mg     2.3     10-08    TPro  7.0  /  Alb  3.0<L>  /  TBili  0.3  /  DBili  x   /  AST  18  /  ALT  17  /  AlkPhos  69  10-09        CAPILLARY BLOOD GLUCOSE      POCT Blood Glucose.: 113 mg/dL (09 Oct 2022 11:44)  POCT Blood Glucose.: 109 mg/dL (09 Oct 2022 07:41)  POCT Blood Glucose.: 115 mg/dL (08 Oct 2022 21:27)  POCT Blood Glucose.: 105 mg/dL (08 Oct 2022 16:23)          REVIEW OF SYSTEMS:  CONSTITUTIONAL: No fever, weight loss, or fatigue  EYES: No eye pain, visual disturbances, or discharge  ENMT:  No difficulty hearing, tinnitus, vertigo; No sinus or throat pain  NECK: No pain or stiffness  RESPIRATORY: No cough, wheezing, chills or hemoptysis; No shortness of breath  CARDIOVASCULAR: No chest pain, palpitations, dizziness, or leg swelling  GASTROINTESTINAL: No abdominal or epigastric pain. No nausea, vomiting, or hematemesis; No diarrhea or constipation. No melena or hematochezia.  GENITOURINARY: No dysuria, frequency, hematuria, or incontinence  NEUROLOGICAL: No headaches, memory loss, loss of strength, numbness, or tremors      Consultant(s) Notes Reviewed:  [x ] YES  [ ] NO    PHYSICAL EXAM:  GENERAL: NAD, well-groomed, well-developed,not in any distress ,  HEAD:  Atraumatic, Normocephalic  EYES: EOMI, PERRLA, conjunctiva and sclera clear  ENMT: No tonsillar erythema, exudates, or enlargement; Moist mucous membranes, Good dentition, No lesions  NECK: Supple, No JVD, Normal thyroid  NERVOUS SYSTEM:  Alert & Oriented X3, No focal deficit   CHEST/LUNG: Good air entry bilateral with no  rales, rhonchi, wheezing, or rubs  HEART: Regular rate and rhythm; No murmurs, rubs, or gallops  ABDOMEN: Soft, Nontender, Nondistended; Bowel sounds present  EXTREMITIES:  2+ Peripheral Pulses, No clubbing, cyanosis, or edema  SKIN: No rashes or lesions    Care Discussed with Consultants/Other Providers [ x] YES  [ ] NO

## 2022-10-10 LAB
ANION GAP SERPL CALC-SCNC: 10 MMOL/L — SIGNIFICANT CHANGE UP (ref 5–17)
BUN SERPL-MCNC: 16 MG/DL — SIGNIFICANT CHANGE UP (ref 7–18)
CALCIUM SERPL-MCNC: 9.4 MG/DL — SIGNIFICANT CHANGE UP (ref 8.4–10.5)
CHLORIDE SERPL-SCNC: 102 MMOL/L — SIGNIFICANT CHANGE UP (ref 96–108)
CO2 SERPL-SCNC: 24 MMOL/L — SIGNIFICANT CHANGE UP (ref 22–31)
CREAT SERPL-MCNC: 0.93 MG/DL — SIGNIFICANT CHANGE UP (ref 0.5–1.3)
EGFR: 64 ML/MIN/1.73M2 — SIGNIFICANT CHANGE UP
GLUCOSE BLDC GLUCOMTR-MCNC: 102 MG/DL — HIGH (ref 70–99)
GLUCOSE BLDC GLUCOMTR-MCNC: 117 MG/DL — HIGH (ref 70–99)
GLUCOSE BLDC GLUCOMTR-MCNC: 120 MG/DL — HIGH (ref 70–99)
GLUCOSE BLDC GLUCOMTR-MCNC: 97 MG/DL — SIGNIFICANT CHANGE UP (ref 70–99)
GLUCOSE SERPL-MCNC: 114 MG/DL — HIGH (ref 70–99)
HCT VFR BLD CALC: 38 % — SIGNIFICANT CHANGE UP (ref 34.5–45)
HGB BLD-MCNC: 12.3 G/DL — SIGNIFICANT CHANGE UP (ref 11.5–15.5)
MCHC RBC-ENTMCNC: 31.3 PG — SIGNIFICANT CHANGE UP (ref 27–34)
MCHC RBC-ENTMCNC: 32.4 GM/DL — SIGNIFICANT CHANGE UP (ref 32–36)
MCV RBC AUTO: 96.7 FL — SIGNIFICANT CHANGE UP (ref 80–100)
NRBC # BLD: 0 /100 WBCS — SIGNIFICANT CHANGE UP (ref 0–0)
PLATELET # BLD AUTO: 380 K/UL — SIGNIFICANT CHANGE UP (ref 150–400)
POTASSIUM SERPL-MCNC: 4.1 MMOL/L — SIGNIFICANT CHANGE UP (ref 3.5–5.3)
POTASSIUM SERPL-SCNC: 4.1 MMOL/L — SIGNIFICANT CHANGE UP (ref 3.5–5.3)
RBC # BLD: 3.93 M/UL — SIGNIFICANT CHANGE UP (ref 3.8–5.2)
RBC # FLD: 13.2 % — SIGNIFICANT CHANGE UP (ref 10.3–14.5)
SODIUM SERPL-SCNC: 136 MMOL/L — SIGNIFICANT CHANGE UP (ref 135–145)
WBC # BLD: 8.22 K/UL — SIGNIFICANT CHANGE UP (ref 3.8–10.5)
WBC # FLD AUTO: 8.22 K/UL — SIGNIFICANT CHANGE UP (ref 3.8–10.5)

## 2022-10-10 RX ORDER — LOSARTAN POTASSIUM 100 MG/1
100 TABLET, FILM COATED ORAL DAILY
Refills: 0 | Status: DISCONTINUED | OUTPATIENT
Start: 2022-10-10 | End: 2022-11-07

## 2022-10-10 RX ADMIN — ENOXAPARIN SODIUM 40 MILLIGRAM(S): 100 INJECTION SUBCUTANEOUS at 05:11

## 2022-10-10 RX ADMIN — LOSARTAN POTASSIUM 100 MILLIGRAM(S): 100 TABLET, FILM COATED ORAL at 21:31

## 2022-10-10 RX ADMIN — PANTOPRAZOLE SODIUM 40 MILLIGRAM(S): 20 TABLET, DELAYED RELEASE ORAL at 05:12

## 2022-10-10 RX ADMIN — Medication 100 MILLIGRAM(S): at 05:10

## 2022-10-10 RX ADMIN — MONTELUKAST 10 MILLIGRAM(S): 4 TABLET, CHEWABLE ORAL at 21:31

## 2022-10-10 RX ADMIN — Medication 650 MILLIGRAM(S): at 05:12

## 2022-10-10 RX ADMIN — Medication 650 MILLIGRAM(S): at 06:28

## 2022-10-10 RX ADMIN — Medication 100 MILLIGRAM(S): at 21:23

## 2022-10-10 RX ADMIN — GABAPENTIN 100 MILLIGRAM(S): 400 CAPSULE ORAL at 05:10

## 2022-10-10 RX ADMIN — CHLORHEXIDINE GLUCONATE 1 APPLICATION(S): 213 SOLUTION TOPICAL at 05:09

## 2022-10-10 RX ADMIN — Medication 100 MILLIGRAM(S): at 14:32

## 2022-10-10 RX ADMIN — GABAPENTIN 100 MILLIGRAM(S): 400 CAPSULE ORAL at 17:17

## 2022-10-10 NOTE — PROGRESS NOTE ADULT - PROBLEM SELECTOR PLAN 1
+ MSSA bacteremia    source likely perineal Abscess   S/P I&D 10/1: cx +staph aureus  Continue Ancef 1g iv q8  repeat CX negative   S/P TTE FOUND No obvious vegetations noted  pending VERONICA to r/o endocarditis  plan for PICC line and 4 weeks of IV antibiotics on Wednesday  TOMMY Limon

## 2022-10-10 NOTE — PROGRESS NOTE ADULT - PROBLEM SELECTOR PLAN 3
border line   likely 2/2 to meds held due to parameters   will change parameters   continue HCTz, lisinopril + propanolol   Monitor BP

## 2022-10-10 NOTE — PROGRESS NOTE ADULT - ASSESSMENT
76 year old with PMH of HTN, DM is here for worsening of perineal abscess after I/D on 10/1. Admitted for further management     {[id number]} Problem/Plan - 1:  ·  Problem: Perineal abscess.   ·  Plan: perineal abscess for 7 days, s/p I/D on 10/1  Now with worsening erythema with pus drainage  Afebrile, no leukocytosis  Lactate 2.8  CT A/P showed right gluteal fold  with infiltration w/o  discrete drainable collection  s/p vanc /clindamycin and 1L bolus    Surgery Consulted: No plans for further I&D at this time  On Ancef per ID . Awaiting PICC line.   f/u repeat lactate  ID help appreciated.   < from: Transthoracic Echocardiogram (10.08.22 @ 06:41) >  CONCLUSIONS:  1. Mitral annular calcification.  2.  Trace aortic regurgitation.  3. Normal left ventricular internal dimensions and wall  thicknesses.  4. Endocardium not well visualized; grossly normal left  ventricular systolic function.  5. Grade I diastolic dysfunction (Impaired relaxation,  mild).  6. Normal right ventricular size and function.  7. No obvious vegetations noted.  Consider VERONICA if  clinically appropriate.    VERONICA pending.     {[id number]} Problem/Plan - 2:  ·  Problem: SERGEY (acute kidney injury).   ·  Plan: Resolved.     {[id number]} Problem/Plan - 3:  ·  Problem: HTN (hypertension).   ·  Plan: Restarting her home meds.   DASH diet.    {[id number]} Problem/Plan - 4:  ·  Problem: Diabetes.   ·  Plan: pt takes metformin at home  Will start SS.    {[id number]} Problem/Plan - 5:  ·  Problem: DVT prophylaxis.   ·  Plan:Will start Lovenox 40mg.    Dispo : DC planning pending PICC line.

## 2022-10-10 NOTE — PROGRESS NOTE ADULT - ASSESSMENT
Right buttock abscess - s/p I&D 10/01  MSSA bacteremia - repeat BC are negative so far  R/o endocarditis    Plan:   ·	continue Ancef 1g iv q8h   ·	awaiting to have VERONICA done  ·	Patient will eventually need a PICC line and 4 weeks of IV antibiotics if repeat blood cultures remains negative.

## 2022-10-10 NOTE — PROGRESS NOTE ADULT - SUBJECTIVE AND OBJECTIVE BOX
Date of Service  : 10-10-22    INTERVAL HPI/OVERNIGHT EVENTS: I feel better.   Vital Signs Last 24 Hrs  T(C): 36.6 (10 Oct 2022 21:11), Max: 36.8 (10 Oct 2022 12:31)  T(F): 97.8 (10 Oct 2022 21:11), Max: 98.3 (10 Oct 2022 12:31)  HR: 68 (10 Oct 2022 21:11) (59 - 69)  BP: 150/96 (10 Oct 2022 21:11) (136/88 - 150/96)  BP(mean): --  RR: 17 (10 Oct 2022 21:11) (16 - 17)  SpO2: 94% (10 Oct 2022 21:11) (92% - 94%)    Parameters below as of 10 Oct 2022 21:11  Patient On (Oxygen Delivery Method): room air      I&O's Summary    MEDICATIONS  (STANDING):  ceFAZolin   IVPB 2000 milliGRAM(s) IV Intermittent every 8 hours  chlorhexidine 2% Cloths 1 Application(s) Topical <User Schedule>  dextrose 5%. 1000 milliLiter(s) (50 mL/Hr) IV Continuous <Continuous>  dextrose 5%. 1000 milliLiter(s) (100 mL/Hr) IV Continuous <Continuous>  dextrose 50% Injectable 25 Gram(s) IV Push once  dextrose 50% Injectable 12.5 Gram(s) IV Push once  dextrose 50% Injectable 25 Gram(s) IV Push once  enoxaparin Injectable 40 milliGRAM(s) SubCutaneous every 24 hours  gabapentin 100 milliGRAM(s) Oral two times a day  glucagon  Injectable 1 milliGRAM(s) IntraMuscular once  hydrochlorothiazide 25 milliGRAM(s) Oral daily  insulin lispro (ADMELOG) corrective regimen sliding scale   SubCutaneous three times a day before meals  insulin lispro (ADMELOG) corrective regimen sliding scale   SubCutaneous at bedtime  losartan 100 milliGRAM(s) Oral daily  montelukast 10 milliGRAM(s) Oral at bedtime  pantoprazole    Tablet      pantoprazole    Tablet 40 milliGRAM(s) Oral before breakfast  propranolol  milliGRAM(s) Oral daily  sodium chloride 0.9%. 1000 milliLiter(s) (80 mL/Hr) IV Continuous <Continuous>    MEDICATIONS  (PRN):  acetaminophen     Tablet .. 650 milliGRAM(s) Oral every 6 hours PRN Temp greater or equal to 38C (100.4F), Mild Pain (1 - 3)  dextrose Oral Gel 15 Gram(s) Oral once PRN Blood Glucose LESS THAN 70 milliGRAM(s)/deciliter    LABS:                        12.3   8.22  )-----------( 380      ( 10 Oct 2022 06:09 )             38.0     10-10    136  |  102  |  16  ----------------------------<  114<H>  4.1   |  24  |  0.93    Ca    9.4      10 Oct 2022 06:09    TPro  7.0  /  Alb  3.0<L>  /  TBili  0.3  /  DBili  x   /  AST  18  /  ALT  17  /  AlkPhos  69  10-09        CAPILLARY BLOOD GLUCOSE      POCT Blood Glucose.: 97 mg/dL (10 Oct 2022 21:24)  POCT Blood Glucose.: 102 mg/dL (10 Oct 2022 16:38)  POCT Blood Glucose.: 120 mg/dL (10 Oct 2022 11:31)  POCT Blood Glucose.: 117 mg/dL (10 Oct 2022 07:51)          REVIEW OF SYSTEMS:  CONSTITUTIONAL: No fever, weight loss, or fatigue  EYES: No eye pain, visual disturbances, or discharge  ENMT:  No difficulty hearing, tinnitus, vertigo; No sinus or throat pain  NECK: No pain or stiffness  RESPIRATORY: No cough, wheezing, chills or hemoptysis; No shortness of breath  CARDIOVASCULAR: No chest pain, palpitations, dizziness, or leg swelling  GASTROINTESTINAL: No abdominal or epigastric pain. No nausea, vomiting, or hematemesis; No diarrhea or constipation. No melena or hematochezia.  GENITOURINARY: No dysuria, frequency, hematuria, or incontinence  NEUROLOGICAL: No headaches, memory loss, loss of strength, numbness, or tremors  SKIN: No itching, burning, rashes, or lesions   ENDOCRINE: No heat or cold intolerance; No hair loss  MUSCULOSKELETAL: No joint pain or swelling; No muscle, back, or extremity pain  PSYCHIATRIC: No depression, anxiety, mood swings, or difficulty sleeping  HEME/LYMPH: No easy bruising, or bleeding gums  ALLERY AND IMMUNOLOGIC: No hives or eczema    RADIOLOGY & ADDITIONAL TESTS:    Consultant(s) Notes Reviewed:  [x ] YES  [ ] NO    PHYSICAL EXAM:  GENERAL: NAD, well-groomed, well-developed,not in any distress ,  HEAD:  Atraumatic, Normocephalic  EYES: EOMI, PERRLA, conjunctiva and sclera clear  ENMT: No tonsillar erythema, exudates, or enlargement; Moist mucous membranes, Good dentition, No lesions  NECK: Supple, No JVD, Normal thyroid  NERVOUS SYSTEM:  Alert & Oriented X3, No focal deficit   CHEST/LUNG: Good air entry bilateral with no  rales, rhonchi, wheezing, or rubs  HEART: Regular rate and rhythm; No murmurs, rubs, or gallops  ABDOMEN: Soft, Nontender, Nondistended; Bowel sounds present  EXTREMITIES:  2+ Peripheral Pulses, No clubbing, cyanosis, or edema  SKIN: No rashes or lesions    Care Discussed with Consultants/Other Providers [ x] YES  [ ] NO

## 2022-10-10 NOTE — PROGRESS NOTE ADULT - PROBLEM SELECTOR PLAN 2
s/p I/D on 10/1  CT A/P showed right gluteal fold with infiltration w/o discrete drainable collection    Surgery followed No plans for acute surgical intervention   Continue daily packing   Continue abx per ID: Ancef 1gm IV q8h  ID Dr. Limon following

## 2022-10-10 NOTE — PROGRESS NOTE ADULT - SUBJECTIVE AND OBJECTIVE BOX
NP Note discussed with  primary attending    Patient is a 76y old  Female who presents with a chief complaint of worsening perineal abscess (07 Oct 2022 09:48)    INTERVAL HPI/OVERNIGHT EVENTS: no acute medical events overnight     MEDICATIONS  (STANDING):  ceFAZolin   IVPB 2000 milliGRAM(s) IV Intermittent every 8 hours  chlorhexidine 2% Cloths 1 Application(s) Topical <User Schedule>  dextrose 5%. 1000 milliLiter(s) (50 mL/Hr) IV Continuous <Continuous>  dextrose 5%. 1000 milliLiter(s) (100 mL/Hr) IV Continuous <Continuous>  dextrose 50% Injectable 25 Gram(s) IV Push once  dextrose 50% Injectable 12.5 Gram(s) IV Push once  dextrose 50% Injectable 25 Gram(s) IV Push once  enoxaparin Injectable 40 milliGRAM(s) SubCutaneous every 24 hours  gabapentin 100 milliGRAM(s) Oral two times a day  glucagon  Injectable 1 milliGRAM(s) IntraMuscular once  hydrochlorothiazide 25 milliGRAM(s) Oral daily  insulin lispro (ADMELOG) corrective regimen sliding scale   SubCutaneous three times a day before meals  insulin lispro (ADMELOG) corrective regimen sliding scale   SubCutaneous at bedtime  losartan      losartan 100 milliGRAM(s) Oral daily  montelukast 10 milliGRAM(s) Oral at bedtime  pantoprazole    Tablet 40 milliGRAM(s) Oral before breakfast  pantoprazole    Tablet      propranolol  milliGRAM(s) Oral daily  sodium chloride 0.9%. 1000 milliLiter(s) (80 mL/Hr) IV Continuous <Continuous>    MEDICATIONS  (PRN):  acetaminophen     Tablet .. 650 milliGRAM(s) Oral every 6 hours PRN Temp greater or equal to 38C (100.4F), Mild Pain (1 - 3)  dextrose Oral Gel 15 Gram(s) Oral once PRN Blood Glucose LESS THAN 70 milliGRAM(s)/deciliter      __________________________________________________  REVIEW OF SYSTEMS:    CONSTITUTIONAL: No fever,   EYES: no acute visual disturbances  NECK: No pain or stiffness  RESPIRATORY: No cough; No shortness of breath  CARDIOVASCULAR: No chest pain, no palpitations  GASTROINTESTINAL: No pain. No nausea or vomiting; No diarrhea   NEUROLOGICAL: No headache or numbness, no tremors  MUSCULOSKELETAL: No joint pain, no muscle pain  GENITOURINARY: no dysuria, no frequency, no hesitancy  PSYCHIATRY: no depression , no anxiety  ALL OTHER  ROS negative        Vital Signs Last 24 Hrs  T(C): 36.8 (10 Oct 2022 12:31), Max: 36.8 (10 Oct 2022 12:31)  T(F): 98.3 (10 Oct 2022 12:31), Max: 98.3 (10 Oct 2022 12:31)  HR: 69 (10 Oct 2022 12:31) (57 - 69)  BP: 141/85 (10 Oct 2022 12:31) (128/86 - 141/85)  BP(mean): --  RR: 16 (10 Oct 2022 12:31) (16 - 18)  SpO2: 92% (10 Oct 2022 12:31) (92% - 96%)    Parameters below as of 10 Oct 2022 12:31  Patient On (Oxygen Delivery Method): room air        ________________________________________________  PHYSICAL EXAM:  GENERAL: NAD  HEENT: Normocephalic;  conjunctivae and sclerae clear  NECK : supple  CHEST/LUNG: Clear to ausculitation bilaterally with good air entry   HEART: S1 S2  regular; no murmurs, gallops or rubs  ABDOMEN: Soft, Nontender, Nondistended; Bowel sounds present  EXTREMITIES: no cyanosis; no edema; no calf tenderness  SKIN: warm and dry; no rash. Right buttock abscess with lingering induration and mild tenderness, no active drainage noted at this time  NERVOUS SYSTEM:  Awake and alert; Oriented  to place, person and time    _________________________________________________  LABS:                        12.3   8.22  )-----------( 380      ( 10 Oct 2022 06:09 )             38.0     10-10    136  |  102  |  16  ----------------------------<  114<H>  4.1   |  24  |  0.93    Ca    9.4      10 Oct 2022 06:09    TPro  7.0  /  Alb  3.0<L>  /  TBili  0.3  /  DBili  x   /  AST  18  /  ALT  17  /  AlkPhos  69  10-09        CAPILLARY BLOOD GLUCOSE      POCT Blood Glucose.: 120 mg/dL (10 Oct 2022 11:31)  POCT Blood Glucose.: 117 mg/dL (10 Oct 2022 07:51)  POCT Blood Glucose.: 149 mg/dL (09 Oct 2022 21:19)  POCT Blood Glucose.: 105 mg/dL (09 Oct 2022 16:37)        RADIOLOGY & ADDITIONAL TESTS: < from: CT Abdomen and Pelvis w/ IV Cont (10.06.22 @ 17:44) >  IMPRESSION:  Infectious process in the right gluteal fold medially with infiltration   of subcutaneous fat and small air bubbles. There is no discrete drainable   collection  Incidental note is made of cholelithiasis and choledocholithiasis without   biliary ductal dilatation    --- End of Report ---      < end of copied text >      Imaging Personally Reviewed:  YES    Consultant(s) Notes Reviewed:   YES    Care Discussed with Consultants: IR, ID     Plan of care was discussed with patient and /or primary care giver; all questions and concerns were addressed and care was aligned with patient's wishes.

## 2022-10-10 NOTE — PROGRESS NOTE ADULT - PROBLEM SELECTOR PLAN 7
Pt from home  remains on IV Abx   pending VERONICA   Will need PICC when repeat BC negative for 4 weeks of abx  NCM to follow for home services

## 2022-10-10 NOTE — PROGRESS NOTE ADULT - SUBJECTIVE AND OBJECTIVE BOX
76y Female is under our care for right buttock abscess (s/p I&D) and MSSA bacteremia. Patient continues to do well and has no new complaints. Admits abscess site occasionally still drains bloody fluid. Repeat BC are negative so far, if remains negative will be able to get PICC line insertion. TTE was negative for vegetations, now awaiting to have VERONICA done.     MEDS:  ceFAZolin   IVPB 1000 milliGRAM(s) IV Intermittent every 8 hours    ALLERGIES: Allergies    codeine (Rash)    Intolerances    REVIEW OF SYSTEMS:  [  ] Not able to illicit  General: no fevers no malaise  Chest: no cough no sob  GI: no nvd  : no urinary sxs +improved buttock pain  Skin: no rashes  Musculoskeletal: no trauma no LBP  Neuro: no ha's no dizziness     VITALS:  Vital Signs Last 24 Hrs  T(C): 36.8 (10-10-22 @ 12:31), Max: 36.8 (10-10-22 @ 12:31)  T(F): 98.3 (10-10-22 @ 12:31), Max: 98.3 (10-10-22 @ 12:31)  HR: 69 (10-10-22 @ 12:31) (57 - 69)  BP: 141/85 (10-10-22 @ 12:31) (128/86 - 141/85)  BP(mean): --  RR: 16 (10-10-22 @ 12:31) (16 - 18)  SpO2: 92% (10-10-22 @ 12:31) (92% - 96%)    PHYSICAL EXAM:  HEENT: n/a  Neck: supple no LN's   Respiratory: lungs clear no rales  Cardiovascular: S1 S2 reg no murmurs  Gastrointestinal: +BS with soft, nondistended abdomen; nontender  : right buttock abscess with lingering induration and mild tenderness, no active drainage noted at this time  Extremities: no edema  Skin: no rashes  Ortho: n/a  Neuro: awake and alert    LABS/DIAGNOSTIC TESTS:                         12.3   8.22  )-----------( 380      ( 10 Oct 2022 06:09 )             38.0   WBC Count: 8.22 K/uL (10-10 @ 06:09)  WBC Count: 7.24 K/uL (10-09 @ 06:02)  WBC Count: 6.32 K/uL (10-08 @ 07:03)  WBC Count: 6.57 K/uL (10-07 @ 06:30)  WBC Count: 7.10 K/uL (10-06 @ 12:00)    10-10    136  |  102  |  16  ----------------------------<  114<H>  4.1   |  24  |  0.93    Ca    9.4      10 Oct 2022 06:09    TPro  7.0  /  Alb  3.0<L>  /  TBili  0.3  /  DBili  x   /  AST  18  /  ALT  17  /  AlkPhos  69  10-09        CULTURES:   .Blood Blood-Peripheral  10-09 @ 06:24   No growth to date.  --  --      .Blood Blood-Peripheral  10-09 @ 06:02   No growth to date.  --  --      .Blood Blood-Peripheral  10-06 @ 12:00   Growth in aerobic and anaerobic bottles: Staphylococcus aureus  See previous culture  [id number]  --  Blood Culture PCR  Staphylococcus aureus      .Abscess Leg - Right  10-01 @ 13:10   Numerous Staphylococcus aureus  --  Staphylococcus aureus        RADIOLOGY:  no new studies

## 2022-10-10 NOTE — PROGRESS NOTE ADULT - ASSESSMENT
76 year old female from home with PMH of HTN, DM is here for worsening of perineal abscess. Patient presented with perineal abscess and had an I/D on 10/1 in the ED and was discharged on keflex. Presents with worsening purulent discharge from abscess, later found to + MSSA Bacteremia.  SX and ID Braxton followed, no acute sx intervention needed at this time. Started on IV Ancef, repeat CX negative. Plan for VERONICA in AM and PICC placement for 4 weeks of ABX

## 2022-10-10 NOTE — CONSULT NOTE ADULT - SUBJECTIVE AND OBJECTIVE BOX
C A R D I O L O G Y  *********************    DATE OF SERVICE: 10-10-22    HISTORY OF PRESENT ILLNESS: HPI:  76 year old with PMH of HTN, DM is here for worsening of perineal abscess.  Patient presented with perineal abscess and had an I/D on 10/1 in the ED and was discharged on keflex.  Patient states that she had worsening redness and  purulent discharge from abscess site. Patient also endorses subjective fevers, but denies any chills, nausea vomiting chest pain, SOB, LOC or known CAD,  abdominal pain, or change in bowel habit.   She is now found with MSSA bacteremia cardiology is called to exclude endocarditis      In the ED:  Afebrile, hemodynamically stable  No leukocytosis  lactate 2.5  CT A/P showed right gluteal fold  with infiltration w/o  discrete drainable collection      (06 Oct 2022 19:54)      PAST MEDICAL & SURGICAL HISTORY:  History of hypertension      Osteopenia      Gastritis      Hypercholesterolemia      Carpal tunnel syndrome              MEDICATIONS:  MEDICATIONS  (STANDING):  ceFAZolin   IVPB 2000 milliGRAM(s) IV Intermittent every 8 hours  chlorhexidine 2% Cloths 1 Application(s) Topical <User Schedule>  dextrose 5%. 1000 milliLiter(s) (50 mL/Hr) IV Continuous <Continuous>  dextrose 5%. 1000 milliLiter(s) (100 mL/Hr) IV Continuous <Continuous>  dextrose 50% Injectable 25 Gram(s) IV Push once  dextrose 50% Injectable 12.5 Gram(s) IV Push once  dextrose 50% Injectable 25 Gram(s) IV Push once  enoxaparin Injectable 40 milliGRAM(s) SubCutaneous every 24 hours  gabapentin 100 milliGRAM(s) Oral two times a day  glucagon  Injectable 1 milliGRAM(s) IntraMuscular once  hydrochlorothiazide 25 milliGRAM(s) Oral daily  insulin lispro (ADMELOG) corrective regimen sliding scale   SubCutaneous three times a day before meals  insulin lispro (ADMELOG) corrective regimen sliding scale   SubCutaneous at bedtime  losartan      losartan 100 milliGRAM(s) Oral daily  montelukast 10 milliGRAM(s) Oral at bedtime  pantoprazole    Tablet 40 milliGRAM(s) Oral before breakfast  pantoprazole    Tablet      propranolol  milliGRAM(s) Oral daily  sodium chloride 0.9%. 1000 milliLiter(s) (80 mL/Hr) IV Continuous <Continuous>      Allergies    codeine (Rash)    Intolerances        FAMILY HISTORY:    Non-contributary for premature coronary disease or sudden cardiac death    SOCIAL HISTORY:    [ x] Non-smoker  [ ] Smoker  [ ] Alcohol    FLU VACCINE THIS YEAR STARTS IN AUGUST:  [ ] Yes    [ ] No    IF OVER 65 HAVE YOU EVER HAD A PNA VACCINE:  [ ] Yes    [ ] No       [ ] N/A      REVIEW OF SYSTEMS:  [ ]chest pain  [  ]shortness of breath  [  ]palpitations  [  ]syncope  [ ]near syncope [ ]upper extremity weakness   [ ] lower extremity weakness  [  ]diplopia  [  ]altered mental status   [  ]fevers  [ ]chills [ ]nausea  [ ]vomitting  [  ]dysphagia    [ ]abdominal pain  [ ]melena  [ ]BRBPR    [  ]epistaxis  [  ]rash    [ ]lower extremity edema        [X] All others negative	  [ ] Unable to obtain      LABS:	 	    CARDIAC MARKERS:                                  12.3   8.22  )-----------( 380      ( 10 Oct 2022 06:09 )             38.0     Hb Trend: 12.3<--    10-10    136  |  102  |  16  ----------------------------<  114<H>  4.1   |  24  |  0.93    Ca    9.4      10 Oct 2022 06:09    TPro  7.0  /  Alb  3.0<L>  /  TBili  0.3  /  DBili  x   /  AST  18  /  ALT  17  /  AlkPhos  69  10-09    Creatinine Trend: 0.93<--, 0.76<--, 0.85<--, 0.95<--, 1.33<--        PHYSICAL EXAM:  T(C): 36.4 (10-10-22 @ 04:47), Max: 36.5 (10-09-22 @ 21:05)  HR: 59 (10-10-22 @ 04:47) (57 - 93)  BP: 136/88 (10-10-22 @ 04:47) (126/89 - 136/88)  RR: 17 (10-10-22 @ 04:47) (17 - 18)  SpO2: 94% (10-10-22 @ 04:47) (94% - 96%)  Wt(kg): --   BMI (kg/m2): 29.5 (10-07-22 @ 18:58)  I&O's Summary    HEENT:  (-)icterus (-)pallor  CV: N S1 S2 1/6 GAGE (+)2 Pulses B/l  Resp:  Clear to ausculatation B/L, normal effort  GI: (+) BS Soft, NT, ND  Lymph:  (-)Edema, (-)obvious lymphadenopathy  Skin: Warm to touch, Normal turgor  Psych: Appropriate mood and affect  	      ECG:  	PENDING    RADIOLOGY:         CXR:   PENDING    ASSESSMENT/PLAN: 	76y Female PMH of HTN, DM is here for worsening of perineal abscess MSSA bacteremia echo with nrmal lV and RV function no vegetations.    - D/W patient and brother  - No obvious contraindication to VERONICA  - Plan for VERONICA tomorrow with Dr. Wade  - NPO after midnight tonight    I once again thank you for allowing me to participate in the care of your patient.  If you have any questions or concerns please do not hesitate to contact me.    Genaro Segura MD, [facility]  BEEPER [phone]

## 2022-10-11 LAB
ANION GAP SERPL CALC-SCNC: 9 MMOL/L — SIGNIFICANT CHANGE UP (ref 5–17)
BUN SERPL-MCNC: 16 MG/DL — SIGNIFICANT CHANGE UP (ref 7–18)
CALCIUM SERPL-MCNC: 9.6 MG/DL — SIGNIFICANT CHANGE UP (ref 8.4–10.5)
CHLORIDE SERPL-SCNC: 103 MMOL/L — SIGNIFICANT CHANGE UP (ref 96–108)
CO2 SERPL-SCNC: 26 MMOL/L — SIGNIFICANT CHANGE UP (ref 22–31)
CREAT SERPL-MCNC: 0.86 MG/DL — SIGNIFICANT CHANGE UP (ref 0.5–1.3)
EGFR: 70 ML/MIN/1.73M2 — SIGNIFICANT CHANGE UP
GLUCOSE BLDC GLUCOMTR-MCNC: 103 MG/DL — HIGH (ref 70–99)
GLUCOSE BLDC GLUCOMTR-MCNC: 114 MG/DL — HIGH (ref 70–99)
GLUCOSE BLDC GLUCOMTR-MCNC: 119 MG/DL — HIGH (ref 70–99)
GLUCOSE BLDC GLUCOMTR-MCNC: 135 MG/DL — HIGH (ref 70–99)
GLUCOSE SERPL-MCNC: 113 MG/DL — HIGH (ref 70–99)
HCT VFR BLD CALC: 40.5 % — SIGNIFICANT CHANGE UP (ref 34.5–45)
HGB BLD-MCNC: 13.1 G/DL — SIGNIFICANT CHANGE UP (ref 11.5–15.5)
MCHC RBC-ENTMCNC: 31.3 PG — SIGNIFICANT CHANGE UP (ref 27–34)
MCHC RBC-ENTMCNC: 32.3 GM/DL — SIGNIFICANT CHANGE UP (ref 32–36)
MCV RBC AUTO: 96.7 FL — SIGNIFICANT CHANGE UP (ref 80–100)
NRBC # BLD: 0 /100 WBCS — SIGNIFICANT CHANGE UP (ref 0–0)
PLATELET # BLD AUTO: 413 K/UL — HIGH (ref 150–400)
POTASSIUM SERPL-MCNC: 4.2 MMOL/L — SIGNIFICANT CHANGE UP (ref 3.5–5.3)
POTASSIUM SERPL-SCNC: 4.2 MMOL/L — SIGNIFICANT CHANGE UP (ref 3.5–5.3)
RBC # BLD: 4.19 M/UL — SIGNIFICANT CHANGE UP (ref 3.8–5.2)
RBC # FLD: 13.2 % — SIGNIFICANT CHANGE UP (ref 10.3–14.5)
SODIUM SERPL-SCNC: 138 MMOL/L — SIGNIFICANT CHANGE UP (ref 135–145)
WBC # BLD: 7.93 K/UL — SIGNIFICANT CHANGE UP (ref 3.8–10.5)
WBC # FLD AUTO: 7.93 K/UL — SIGNIFICANT CHANGE UP (ref 3.8–10.5)

## 2022-10-11 PROCEDURE — 36573 INSJ PICC RS&I 5 YR+: CPT

## 2022-10-11 PROCEDURE — 36573 INSJ PICC RS&I 5 YR+: CPT | Mod: LT

## 2022-10-11 RX ORDER — SODIUM CHLORIDE 9 MG/ML
10 INJECTION INTRAMUSCULAR; INTRAVENOUS; SUBCUTANEOUS
Refills: 0 | Status: DISCONTINUED | OUTPATIENT
Start: 2022-10-11 | End: 2022-11-07

## 2022-10-11 RX ORDER — CHLORHEXIDINE GLUCONATE 213 G/1000ML
1 SOLUTION TOPICAL DAILY
Refills: 0 | Status: DISCONTINUED | OUTPATIENT
Start: 2022-10-12 | End: 2022-10-18

## 2022-10-11 RX ADMIN — PANTOPRAZOLE SODIUM 40 MILLIGRAM(S): 20 TABLET, DELAYED RELEASE ORAL at 06:36

## 2022-10-11 RX ADMIN — LOSARTAN POTASSIUM 100 MILLIGRAM(S): 100 TABLET, FILM COATED ORAL at 06:36

## 2022-10-11 RX ADMIN — CHLORHEXIDINE GLUCONATE 1 APPLICATION(S): 213 SOLUTION TOPICAL at 07:01

## 2022-10-11 RX ADMIN — Medication 25 MILLIGRAM(S): at 06:36

## 2022-10-11 RX ADMIN — Medication 160 MILLIGRAM(S): at 06:35

## 2022-10-11 RX ADMIN — Medication 100 MILLIGRAM(S): at 13:41

## 2022-10-11 RX ADMIN — Medication 650 MILLIGRAM(S): at 18:01

## 2022-10-11 RX ADMIN — Medication 650 MILLIGRAM(S): at 19:12

## 2022-10-11 RX ADMIN — MONTELUKAST 10 MILLIGRAM(S): 4 TABLET, CHEWABLE ORAL at 21:35

## 2022-10-11 RX ADMIN — Medication 100 MILLIGRAM(S): at 21:35

## 2022-10-11 RX ADMIN — Medication 650 MILLIGRAM(S): at 23:24

## 2022-10-11 RX ADMIN — Medication 100 MILLIGRAM(S): at 07:01

## 2022-10-11 RX ADMIN — GABAPENTIN 100 MILLIGRAM(S): 400 CAPSULE ORAL at 17:56

## 2022-10-11 RX ADMIN — GABAPENTIN 100 MILLIGRAM(S): 400 CAPSULE ORAL at 07:03

## 2022-10-11 NOTE — PROGRESS NOTE ADULT - SUBJECTIVE AND OBJECTIVE BOX
Date of Service  : 10-11-22     INTERVAL HPI/OVERNIGHT EVENTS:  I feel fine.   Vital Signs Last 24 Hrs  T(C): 36.6 (11 Oct 2022 14:37), Max: 36.6 (10 Oct 2022 21:11)  T(F): 97.8 (11 Oct 2022 14:37), Max: 97.8 (10 Oct 2022 21:11)  HR: 70 (11 Oct 2022 14:37) (68 - 70)  BP: 131/82 (11 Oct 2022 14:37) (131/82 - 150/96)  BP(mean): --  RR: 17 (11 Oct 2022 14:37) (17 - 18)  SpO2: 91% (11 Oct 2022 14:37) (91% - 95%)    Parameters below as of 11 Oct 2022 14:37  Patient On (Oxygen Delivery Method): room air      I&O's Summary    MEDICATIONS  (STANDING):  ceFAZolin   IVPB 2000 milliGRAM(s) IV Intermittent every 8 hours  chlorhexidine 2% Cloths 1 Application(s) Topical <User Schedule>  dextrose 5%. 1000 milliLiter(s) (50 mL/Hr) IV Continuous <Continuous>  dextrose 5%. 1000 milliLiter(s) (100 mL/Hr) IV Continuous <Continuous>  dextrose 50% Injectable 25 Gram(s) IV Push once  dextrose 50% Injectable 12.5 Gram(s) IV Push once  dextrose 50% Injectable 25 Gram(s) IV Push once  enoxaparin Injectable 40 milliGRAM(s) SubCutaneous every 24 hours  gabapentin 100 milliGRAM(s) Oral two times a day  glucagon  Injectable 1 milliGRAM(s) IntraMuscular once  hydrochlorothiazide 25 milliGRAM(s) Oral daily  insulin lispro (ADMELOG) corrective regimen sliding scale   SubCutaneous three times a day before meals  insulin lispro (ADMELOG) corrective regimen sliding scale   SubCutaneous at bedtime  losartan 100 milliGRAM(s) Oral daily  montelukast 10 milliGRAM(s) Oral at bedtime  pantoprazole    Tablet 40 milliGRAM(s) Oral before breakfast  pantoprazole    Tablet      propranolol  milliGRAM(s) Oral daily  sodium chloride 0.9%. 1000 milliLiter(s) (80 mL/Hr) IV Continuous <Continuous>    MEDICATIONS  (PRN):  acetaminophen     Tablet .. 650 milliGRAM(s) Oral every 6 hours PRN Temp greater or equal to 38C (100.4F), Mild Pain (1 - 3)  dextrose Oral Gel 15 Gram(s) Oral once PRN Blood Glucose LESS THAN 70 milliGRAM(s)/deciliter  sodium chloride 0.9% lock flush 10 milliLiter(s) IV Push every 1 hour PRN Pre/post blood products, medications, blood draw, and to maintain line patency    LABS:                        13.1   7.93  )-----------( 413      ( 11 Oct 2022 12:26 )             40.5     10-11    138  |  103  |  16  ----------------------------<  113<H>  4.2   |  26  |  0.86    Ca    9.6      11 Oct 2022 12:26          CAPILLARY BLOOD GLUCOSE      POCT Blood Glucose.: 103 mg/dL (11 Oct 2022 12:25)  POCT Blood Glucose.: 114 mg/dL (11 Oct 2022 08:09)  POCT Blood Glucose.: 97 mg/dL (10 Oct 2022 21:24)          REVIEW OF SYSTEMS:  CONSTITUTIONAL: No fever, weight loss, or fatigue  EYES: No eye pain, visual disturbances, or discharge  ENMT:  No difficulty hearing, tinnitus, vertigo; No sinus or throat pain  NECK: No pain or stiffness  RESPIRATORY: No cough, wheezing, chills or hemoptysis; No shortness of breath  CARDIOVASCULAR: No chest pain, palpitations, dizziness, or leg swelling  GASTROINTESTINAL: No abdominal or epigastric pain. No nausea, vomiting, or hematemesis; No diarrhea or constipation. No melena or hematochezia.  GENITOURINARY: No dysuria, frequency, hematuria, or incontinence  NEUROLOGICAL: No headaches, memory loss, loss of strength, numbness, or tremors      Consultant(s) Notes Reviewed:  [x ] YES  [ ] NO    PHYSICAL EXAM:  GENERAL: NAD, well-groomed, well-developed,not in any distress ,  HEAD:  Atraumatic, Normocephalic  EYES: EOMI, PERRLA, conjunctiva and sclera clear  ENMT: No tonsillar erythema, exudates, or enlargement; Moist mucous membranes, Good dentition, No lesions  NECK: Supple, No JVD, Normal thyroid  NERVOUS SYSTEM:  Alert & Oriented X3, No focal deficit   CHEST/LUNG: Good air entry bilateral with no  rales, rhonchi, wheezing, or rubs  HEART: Regular rate and rhythm; No murmurs, rubs, or gallops  ABDOMEN: Soft, Nontender, Nondistended; Bowel sounds present  EXTREMITIES:  2+ Peripheral Pulses, No clubbing, cyanosis, or edema  S/P PICC line.     Care Discussed with Consultants/Other Providers [ x] YES  [ ] NO

## 2022-10-11 NOTE — PROGRESS NOTE ADULT - ASSESSMENT
Patient is unlikely to benefit from further surgery at this time.  IV Abx  Will re-assess in a few days if patient still admitted.    Discussed with Dr. Templeton

## 2022-10-11 NOTE — PROGRESS NOTE ADULT - ASSESSMENT
76 year old female from home with PMH of HTN, DM is here for worsening of perineal abscess. Patient presented with perineal abscess and had an I/D on 10/1 in the ED and was discharged on keflex. Presents with worsening purulent discharge from abscess, later found to + MSSA Bacteremia.  SX and ID Braxton followed, no acute sx intervention needed at this time. Started on IV Ancef, repeat CX negative. Plan for VERONICA in AM and PICC placement for 4 weeks of ABX. NCM to follow for home infusion services      Problem/Plan - 1:  ·  Problem: MSSA bacteremia.   ·  Plan: + MSSA bacteremia    source likely perineal Abscess   S/P I&D 10/1: cx +staph aureus  Continue Ancef 1g iv q8  repeat CX negative   S/P TTE FOUND No obvious vegetations noted  pending VERONICA to r/o endocarditis  S/P  PICC line and 4 weeks of IV antibiotics this AM   ID helping.      Problem/Plan - 2:  ·  Problem: Perineal abscess.   ·  Plan: s/p I/D on 10/1  CT A/P showed right gluteal fold with infiltration w/o discrete drainable collection    Surgery followed No plans for acute surgical intervention   Continue daily packing   Continue abx per ID: Ancef 1gm IV q8h  ID Dr. Limon following.     Problem/Plan - 3:  ·  Problem: HTN (hypertension).   ·  Plan: border line   likely 2/2 to meds held due to parameters   will change parameters   continue HCTz, lisinopril + propanolol   Monitor BP.     Problem/Plan - 4:  ·  Problem: Diabetes.   ·  Plan: A1C 6.4  Monitor glucose ac/HS and cover with Admelog S/s.     Problem/Plan - 5:  ·  Problem: SERGEY (acute kidney injury).   ·  Plan: now resolved   SCR now WNL   avoid nephrotoxins   monitor BMP.   Problem/Plan - 6:  ·  Problem: DVT prophylaxis.   ·  Plan: dvt: Lovenox  gi: protonix.     Problem/Plan - 7:  ·  Problem: Discharge planning issues.   ·  Plan: Pt from home  Awaiting VERONICA.       Dispo : DC planning pending VERONICA.

## 2022-10-11 NOTE — PROGRESS NOTE ADULT - SUBJECTIVE AND OBJECTIVE BOX
75 yo woman s/p bedside I&D of abscess on the perineum on 10/1, complicated by MSSA bacteremia.   Patient seen for re-evlauation of abscess site.    Vital Signs Last 24 Hrs  T(C): 36.6 (11 Oct 2022 14:37), Max: 36.6 (10 Oct 2022 21:11)  T(F): 97.8 (11 Oct 2022 14:37), Max: 97.8 (10 Oct 2022 21:11)  HR: 70 (11 Oct 2022 14:37) (68 - 70)  BP: 131/82 (11 Oct 2022 14:37) (131/82 - 150/96)  RR: 17 (11 Oct 2022 14:37) (17 - 18)  SpO2: 91% (11 Oct 2022 14:37) (91% - 95%)  Parameters below as of 11 Oct 2022 14:37  Patient On (Oxygen Delivery Method): room air    GEN: alert, NAD  BUTTOCK: abscess site closed, no skin opening, no packing or dressing in place. No fluctuance, mild induration, mild tenderness to palpation.                           13.1   7.93  )-----------( 413      ( 11 Oct 2022 12:26 )             40.5     10-11    138  |  103  |  16  ----------------------------<  113<H>  4.2   |  26  |  0.86    Ca    9.6      11 Oct 2022 12:26

## 2022-10-11 NOTE — PROCEDURE NOTE - ADDITIONAL PROCEDURE DETAILS
36  cm 4Fr  PICC inserted via the left basilic vein.  Sterile dressing applied.  Tip location SVC/ RA Junction.

## 2022-10-11 NOTE — PROGRESS NOTE ADULT - PROBLEM SELECTOR PLAN 1
+ MSSA bacteremia    source likely perineal Abscess   S/P I&D 10/1: cx +staph aureus  Continue Ancef 1g iv q8  repeat CX negative   S/P TTE FOUND No obvious vegetations noted  pending VERONICA to r/o endocarditis  plan for PICC line and 4 weeks of IV antibiotics this AM   ID Braxton

## 2022-10-11 NOTE — PROGRESS NOTE ADULT - SUBJECTIVE AND OBJECTIVE BOX
NP Note discussed with  primary attending    Patient is a 76y old  Female who presents with a chief complaint of MSSA Bacteremia (10 Oct 2022 16:18)      INTERVAL HPI/OVERNIGHT EVENTS: plan for PICC placement and VERONICA this AM     MEDICATIONS  (STANDING):  ceFAZolin   IVPB 2000 milliGRAM(s) IV Intermittent every 8 hours  chlorhexidine 2% Cloths 1 Application(s) Topical <User Schedule>  dextrose 5%. 1000 milliLiter(s) (50 mL/Hr) IV Continuous <Continuous>  dextrose 5%. 1000 milliLiter(s) (100 mL/Hr) IV Continuous <Continuous>  dextrose 50% Injectable 25 Gram(s) IV Push once  dextrose 50% Injectable 12.5 Gram(s) IV Push once  dextrose 50% Injectable 25 Gram(s) IV Push once  enoxaparin Injectable 40 milliGRAM(s) SubCutaneous every 24 hours  gabapentin 100 milliGRAM(s) Oral two times a day  glucagon  Injectable 1 milliGRAM(s) IntraMuscular once  hydrochlorothiazide 25 milliGRAM(s) Oral daily  insulin lispro (ADMELOG) corrective regimen sliding scale   SubCutaneous three times a day before meals  insulin lispro (ADMELOG) corrective regimen sliding scale   SubCutaneous at bedtime  losartan 100 milliGRAM(s) Oral daily  montelukast 10 milliGRAM(s) Oral at bedtime  pantoprazole    Tablet 40 milliGRAM(s) Oral before breakfast  pantoprazole    Tablet      propranolol  milliGRAM(s) Oral daily  sodium chloride 0.9%. 1000 milliLiter(s) (80 mL/Hr) IV Continuous <Continuous>    MEDICATIONS  (PRN):  acetaminophen     Tablet .. 650 milliGRAM(s) Oral every 6 hours PRN Temp greater or equal to 38C (100.4F), Mild Pain (1 - 3)  dextrose Oral Gel 15 Gram(s) Oral once PRN Blood Glucose LESS THAN 70 milliGRAM(s)/deciliter      __________________________________________________  REVIEW OF SYSTEMS:    CONSTITUTIONAL: No fever,   EYES: no acute visual disturbances  NECK: No pain or stiffness  RESPIRATORY: No cough; No shortness of breath  CARDIOVASCULAR: No chest pain, no palpitations  GASTROINTESTINAL: No pain. No nausea or vomiting; No diarrhea   NEUROLOGICAL: No headache or numbness, no tremors  MUSCULOSKELETAL: No joint pain, no muscle pain  GENITOURINARY: no dysuria, no frequency, no hesitancy  PSYCHIATRY: no depression , no anxiety  ALL OTHER  ROS negative        Vital Signs Last 24 Hrs  T(C): 36.3 (11 Oct 2022 05:43), Max: 36.8 (10 Oct 2022 12:31)  T(F): 97.4 (11 Oct 2022 05:43), Max: 98.3 (10 Oct 2022 12:31)  HR: 69 (11 Oct 2022 05:43) (68 - 69)  BP: 137/82 (11 Oct 2022 05:43) (137/82 - 150/96)  BP(mean): --  RR: 18 (11 Oct 2022 05:43) (16 - 18)  SpO2: 95% (11 Oct 2022 05:43) (92% - 95%)    Parameters below as of 11 Oct 2022 05:43  Patient On (Oxygen Delivery Method): room air        ________________________________________________  PHYSICAL EXAM:  GENERAL: NAD  HEENT: Normocephalic;  conjunctivae and sclerae clear;  NECK : supple  CHEST/LUNG: Clear to ausculitation bilaterally with good air entry   HEART: S1 S2  regular; no murmurs, gallops or rubs  ABDOMEN: Soft, Nontender, Nondistended; Bowel sounds present  EXTREMITIES: no cyanosis; no edema; no calf tenderness  SKIN: warm and dry; no rash  NERVOUS SYSTEM:  Awake and alert; Oriented  to place, person and time    _________________________________________________  LABS:                        12.3   8.22  )-----------( 380      ( 10 Oct 2022 06:09 )             38.0     10-10    136  |  102  |  16  ----------------------------<  114<H>  4.1   |  24  |  0.93    Ca    9.4      10 Oct 2022 06:09          CAPILLARY BLOOD GLUCOSE      POCT Blood Glucose.: 114 mg/dL (11 Oct 2022 08:09)  POCT Blood Glucose.: 97 mg/dL (10 Oct 2022 21:24)  POCT Blood Glucose.: 102 mg/dL (10 Oct 2022 16:38)  POCT Blood Glucose.: 120 mg/dL (10 Oct 2022 11:31)        RADIOLOGY & ADDITIONAL TESTS: < from: CT Abdomen and Pelvis w/ IV Cont (10.06.22 @ 17:44) >  IMPRESSION:  Infectious process in the right gluteal fold medially with infiltration   of subcutaneous fat and small air bubbles. There is no discrete drainable   collection  Incidental note is made of cholelithiasis and choledocholithiasis without   biliary ductal dilatation    --- End of Report ---      < end of copied text >      Imaging Personally Reviewed:  YES/  Consultant(s) Notes Reviewed:   YES/    Plan of care was discussed with patient and /or primary care giver; all questions and concerns were addressed and care was aligned with patient's wishes.

## 2022-10-11 NOTE — PROGRESS NOTE ADULT - ASSESSMENT
76 year old female from home with PMH of HTN, DM is here for worsening of perineal abscess. Patient presented with perineal abscess and had an I/D on 10/1 in the ED and was discharged on keflex. Presents with worsening purulent discharge from abscess, later found to + MSSA Bacteremia.  SX and ID Braxton followed, no acute sx intervention needed at this time. Started on IV Ancef, repeat CX negative. Plan for VERONICA in AM and PICC placement for 4 weeks of ABX. NCM to follow for home infusion services

## 2022-10-11 NOTE — PROGRESS NOTE ADULT - PROBLEM SELECTOR PLAN 5
pre-renal due to infection  SCreat normalized  Continue hydration   Monitor BMP now resolved   SCR now WNL   avoid nephrotoxins   monitor BMP

## 2022-10-12 LAB
ANION GAP SERPL CALC-SCNC: 8 MMOL/L — SIGNIFICANT CHANGE UP (ref 5–17)
BUN SERPL-MCNC: 23 MG/DL — HIGH (ref 7–18)
CALCIUM SERPL-MCNC: 9.2 MG/DL — SIGNIFICANT CHANGE UP (ref 8.4–10.5)
CHLORIDE SERPL-SCNC: 104 MMOL/L — SIGNIFICANT CHANGE UP (ref 96–108)
CO2 SERPL-SCNC: 26 MMOL/L — SIGNIFICANT CHANGE UP (ref 22–31)
CREAT SERPL-MCNC: 0.93 MG/DL — SIGNIFICANT CHANGE UP (ref 0.5–1.3)
EGFR: 64 ML/MIN/1.73M2 — SIGNIFICANT CHANGE UP
GLUCOSE BLDC GLUCOMTR-MCNC: 113 MG/DL — HIGH (ref 70–99)
GLUCOSE BLDC GLUCOMTR-MCNC: 119 MG/DL — HIGH (ref 70–99)
GLUCOSE BLDC GLUCOMTR-MCNC: 129 MG/DL — HIGH (ref 70–99)
GLUCOSE BLDC GLUCOMTR-MCNC: 141 MG/DL — HIGH (ref 70–99)
GLUCOSE SERPL-MCNC: 127 MG/DL — HIGH (ref 70–99)
HCT VFR BLD CALC: 37.8 % — SIGNIFICANT CHANGE UP (ref 34.5–45)
HGB BLD-MCNC: 12.1 G/DL — SIGNIFICANT CHANGE UP (ref 11.5–15.5)
MCHC RBC-ENTMCNC: 30.9 PG — SIGNIFICANT CHANGE UP (ref 27–34)
MCHC RBC-ENTMCNC: 32 GM/DL — SIGNIFICANT CHANGE UP (ref 32–36)
MCV RBC AUTO: 96.7 FL — SIGNIFICANT CHANGE UP (ref 80–100)
NRBC # BLD: 0 /100 WBCS — SIGNIFICANT CHANGE UP (ref 0–0)
PLATELET # BLD AUTO: 358 K/UL — SIGNIFICANT CHANGE UP (ref 150–400)
POTASSIUM SERPL-MCNC: 3.9 MMOL/L — SIGNIFICANT CHANGE UP (ref 3.5–5.3)
POTASSIUM SERPL-SCNC: 3.9 MMOL/L — SIGNIFICANT CHANGE UP (ref 3.5–5.3)
RBC # BLD: 3.91 M/UL — SIGNIFICANT CHANGE UP (ref 3.8–5.2)
RBC # FLD: 13.3 % — SIGNIFICANT CHANGE UP (ref 10.3–14.5)
SARS-COV-2 RNA SPEC QL NAA+PROBE: SIGNIFICANT CHANGE UP
SODIUM SERPL-SCNC: 138 MMOL/L — SIGNIFICANT CHANGE UP (ref 135–145)
WBC # BLD: 8.61 K/UL — SIGNIFICANT CHANGE UP (ref 3.8–10.5)
WBC # FLD AUTO: 8.61 K/UL — SIGNIFICANT CHANGE UP (ref 3.8–10.5)

## 2022-10-12 PROCEDURE — 93320 DOPPLER ECHO COMPLETE: CPT | Mod: 26

## 2022-10-12 PROCEDURE — 93312 ECHO TRANSESOPHAGEAL: CPT | Mod: 26

## 2022-10-12 PROCEDURE — 93325 DOPPLER ECHO COLOR FLOW MAPG: CPT | Mod: 26

## 2022-10-12 RX ORDER — NYSTATIN CREAM 100000 [USP'U]/G
1 CREAM TOPICAL
Refills: 0 | Status: DISCONTINUED | OUTPATIENT
Start: 2022-10-12 | End: 2022-10-28

## 2022-10-12 RX ORDER — CEFAZOLIN SODIUM 1 G
1000 VIAL (EA) INJECTION EVERY 8 HOURS
Refills: 0 | Status: DISCONTINUED | OUTPATIENT
Start: 2022-10-12 | End: 2022-11-05

## 2022-10-12 RX ORDER — CEFAZOLIN SODIUM 1 G
1 VIAL (EA) INJECTION
Qty: 84 | Refills: 0
Start: 2022-10-12 | End: 2022-11-08

## 2022-10-12 RX ADMIN — Medication 100 MILLIGRAM(S): at 13:55

## 2022-10-12 RX ADMIN — Medication 100 MILLIGRAM(S): at 21:26

## 2022-10-12 RX ADMIN — CHLORHEXIDINE GLUCONATE 1 APPLICATION(S): 213 SOLUTION TOPICAL at 05:21

## 2022-10-12 RX ADMIN — CHLORHEXIDINE GLUCONATE 1 APPLICATION(S): 213 SOLUTION TOPICAL at 12:06

## 2022-10-12 RX ADMIN — Medication 650 MILLIGRAM(S): at 06:19

## 2022-10-12 RX ADMIN — GABAPENTIN 100 MILLIGRAM(S): 400 CAPSULE ORAL at 17:12

## 2022-10-12 RX ADMIN — LOSARTAN POTASSIUM 100 MILLIGRAM(S): 100 TABLET, FILM COATED ORAL at 05:22

## 2022-10-12 RX ADMIN — Medication 30 MILLILITER(S): at 21:33

## 2022-10-12 RX ADMIN — GABAPENTIN 100 MILLIGRAM(S): 400 CAPSULE ORAL at 05:22

## 2022-10-12 RX ADMIN — Medication 100 MILLIGRAM(S): at 05:21

## 2022-10-12 RX ADMIN — MONTELUKAST 10 MILLIGRAM(S): 4 TABLET, CHEWABLE ORAL at 21:26

## 2022-10-12 RX ADMIN — PANTOPRAZOLE SODIUM 40 MILLIGRAM(S): 20 TABLET, DELAYED RELEASE ORAL at 05:24

## 2022-10-12 RX ADMIN — Medication 650 MILLIGRAM(S): at 05:22

## 2022-10-12 RX ADMIN — Medication 30 MILLILITER(S): at 15:38

## 2022-10-12 RX ADMIN — Medication 650 MILLIGRAM(S): at 00:21

## 2022-10-12 RX ADMIN — NYSTATIN CREAM 1 APPLICATION(S): 100000 CREAM TOPICAL at 17:14

## 2022-10-12 RX ADMIN — ENOXAPARIN SODIUM 40 MILLIGRAM(S): 100 INJECTION SUBCUTANEOUS at 05:23

## 2022-10-12 NOTE — DIETITIAN INITIAL EVALUATION ADULT - PERTINENT LABORATORY DATA
10-12    138  |  104  |  23<H>  ----------------------------<  127<H>  3.9   |  26  |  0.93    Ca    9.2      12 Oct 2022 06:01    POCT Blood Glucose.: 141 mg/dL (10-12-22 @ 11:40)  A1C with Estimated Average Glucose Result: 6.4 % (10-08-22 @ 07:03)

## 2022-10-12 NOTE — PROGRESS NOTE ADULT - ASSESSMENT
76 year old female from home with PMH of HTN, DM is here for worsening of perineal abscess. Patient presented with perineal abscess and had an I/D on 10/1 in the ED and was discharged on keflex. Presents with worsening purulent discharge from abscess, later found to + MSSA Bacteremia.SX and ID Braxton followed, no acute sx intervention needed at this time. Started on IV Ancef, repeat CX negative. S/P PICC placement, VERONICA this AM. Outpatient plan to continue Ancef 4 weeks of ABX (until 11/9/22). NCM to follow for home infusion services, family will need to be taught infusion prior to D/C

## 2022-10-12 NOTE — PROGRESS NOTE ADULT - SUBJECTIVE AND OBJECTIVE BOX
76y Female is under our care for right buttock abscess (s/p I&D) and MSSA bacteremia. Patient underwent PICC line insertion yesterday. At present, she is doing well and has no new complaints. Remains afebrile with normal wbc count. Awaiting to go for VERONICA today. Possible dc planning tomorrow once IV infusion services are organized.     MEDS:  ceFAZolin   IVPB 1000 milliGRAM(s) IV Intermittent every 8 hours    ALLERGIES: Allergies    codeine (Rash)    Intolerances    REVIEW OF SYSTEMS:  [  ] Not able to illicit  General: no fevers no malaise  Chest: no cough no sob  GI: no nvd  : no urinary sxs +mild buttock discomfort  Skin: no rashes  Musculoskeletal: no trauma no LBP  Neuro: no ha's no dizziness     VITALS:  Vital Signs Last 24 Hrs  T(C): 36.6 (10-12-22 @ 05:55), Max: 36.9 (10-11-22 @ 21:21)  T(F): 97.8 (10-12-22 @ 05:55), Max: 98.5 (10-11-22 @ 21:21)  HR: 58 (10-12-22 @ 05:55) (58 - 70)  BP: 133/91 (10-12-22 @ 05:55) (128/83 - 133/91)  BP(mean): --  RR: 18 (10-12-22 @ 05:55) (17 - 18)  SpO2: 96% (10-12-22 @ 05:55) (91% - 96%)    PHYSICAL EXAM:  HEENT: n/a  Neck: supple no LN's   Respiratory: lungs clear no rales  Cardiovascular: S1 S2 reg no murmurs  Gastrointestinal: +BS with soft, nondistended abdomen; nontender  Extremities: no edema, +left arm PICC line  Skin: no rashes  Ortho: n/a  Neuro: awake and alert    LABS/DIAGNOSTIC TESTS:                                     12.1   8.61  )-----------( 358      ( 12 Oct 2022 06:01 )             37.8   WBC Count: 8.61 K/uL (10-12 @ 06:01)  WBC Count: 7.93 K/uL (10-11 @ 12:26)  WBC Count: 8.22 K/uL (10-10 @ 06:09)  WBC Count: 7.24 K/uL (10-09 @ 06:02)  WBC Count: 6.32 K/uL (10-08 @ 07:03)    10-12    138  |  104  |  23<H>  ----------------------------<  127<H>  3.9   |  26  |  0.93    Ca    9.2      12 Oct 2022 06:01        CULTURES:   .Blood Blood-Peripheral  10-09 @ 06:24   No growth to date.  --  --      .Blood Blood-Peripheral  10-09 @ 06:02   No growth to date.  --  --      .Blood Blood-Peripheral  10-06 @ 12:00   Growth in aerobic and anaerobic bottles: Staphylococcus aureus  See previous culture  [id number]  --  Blood Culture PCR  Staphylococcus aureus      .Abscess Leg - Right  10-01 @ 13:10   Numerous Staphylococcus aureus  --  Staphylococcus aureus        RADIOLOGY:  no new studies

## 2022-10-12 NOTE — PROGRESS NOTE ADULT - ASSESSMENT
76 year old female from home with PMH of HTN, DM is here for worsening of perineal abscess. Patient presented with perineal abscess and had an I/D on 10/1 in the ED and was discharged on keflex. Presents with worsening purulent discharge from abscess, later found to + MSSA Bacteremia.  SX and ID Braxton followed, no acute sx intervention needed at this time. Started on IV Ancef, repeat CX negative. Plan for VERONICA in AM and PICC placement for 4 weeks of ABX. NCM to follow for home infusion services      Problem/Plan - 1:  ·  Problem: MSSA bacteremia.   ·  Plan: + MSSA bacteremia    source likely perineal Abscess   S/P I&D 10/1: cx +staph aureus  Continue Ancef 1g iv q8  repeat CX negative   S/P TTE FOUND No obvious vegetations noted  pending VERONICA to r/o endocarditis  S/P  PICC line and 4 weeks of IV antibiotics this AM   ID helping.   < from: VERONICA w/Doppler (10.12.22 @ 11:15) >  ------------------------------------------------------------------------  CONCLUSIONS:  1. Normal mitral valve. Trace mitral regurgitation.  2. Normal trileaflet aortic valve. No aortic stenosis.  Trivial aortic regurgitation.  3. Normal Left Ventricular SystolicFunction,  (EF = 55 to  60%)  4. Normal right ventricular size and function. A catheter  is visualized in the right heart.  5. Normal tricuspid valve. Trace tricuspid regurgitation.  6. Normal pulmonic valve. Trace pulmonic insufficiency is  noted.  7. No pericardial effusion.  8. No obvious evidence of endocarditis    < end of copied text >     Problem/Plan - 2:  ·  Problem: Perineal abscess.   ·  Plan: s/p I/D on 10/1  CT A/P showed right gluteal fold with infiltration w/o discrete drainable collection    Surgery followed No plans for acute surgical intervention   Continue daily packing   Continue abx per ID: Ancef 1gm IV q8h  ID Dr. Limon following.     Problem/Plan - 3:  ·  Problem: HTN (hypertension).   ·  Plan: border line   likely 2/2 to meds held due to parameters   will change parameters   continue HCTz, lisinopril + propanolol   Monitor BP.     Problem/Plan - 4:  ·  Problem: Diabetes.   ·  Plan: A1C 6.4  Monitor glucose ac/HS and cover with Admelog S/s.     Problem/Plan - 5:  ·  Problem: SERGEY (acute kidney injury).   ·  Plan: now resolved   SCR now WNL   avoid nephrotoxins   monitor BMP.     Problem/Plan - 6:  ·  Problem: DVT prophylaxis.   ·  Plan: dvt: Lovenox  gi: protonix.     Problem/Plan - 7:  ·  Problem: Discharge planning issues.   ·  Plan: Pt from home      Dispo : DC planning as Medically stable.

## 2022-10-12 NOTE — DIETITIAN INITIAL EVALUATION ADULT - OTHER INFO
76 years old female admitted for cutaneous abscess of buttock, PMH: DM SERGEY HTN MRS bacteremia. Pt visited during lunch, feeding self; reports good Po intake and no weight loss. Does not follow any diet at home but tries to avoid carbohydrates due to dx of DM. Takes soy milk or almond milk, food preferences forwarded to kitchen.  HgbA1C noted 6.4 desirable.

## 2022-10-12 NOTE — PROGRESS NOTE ADULT - PROBLEM SELECTOR PLAN 1
+ MSSA bacteremia    source likely perineal Abscess   S/P I&D 10/1: cx +staph aureus  Continue Ancef 1g iv q8  repeat CX negative   S/P TTE FOUND No obvious vegetations noted  pending VERONICA to r/o endocarditis  s/p PICC placement for 4 weeks of IV antibiotics  TOMMY Limon

## 2022-10-12 NOTE — DISCHARGE NOTE PROVIDER - HOSPITAL COURSE
76 year old female from home with PMH of HTN, DM is here for worsening of perineal abscess. Patient presented with perineal abscess and had an I/D on 10/1 in the ED and was discharged on keflex.  Presents with worsening purulent discharge from abscess, later found to + MSSA Bacteremia. SX and ID Braxton followed, no acute sx intervention needed at this time. Started on IV Ancef, repeat CX negative.   Plan for VERONICA in AM and PICC placement for 4 weeks of ABX. NCM to follow for home infusion services -**-*-*- INCOMPLETE 76 year old female from home with PMH of HTN, DM is here for worsening of perineal abscess. Patient presented with perineal abscess and had an I/D on 10/1 in the ED and was discharged on keflex.  Presents with worsening purulent discharge from abscess, later found to + MSSA Bacteremia. SX and ID Braxton followed, no acute sx intervention needed at this time. Started on IV Ancef, repeat CX negative.   Plan for VERONICA in AM and PICC placement for 4 weeks of ABX. NCM to follow for home infusion services -**-*-*- INCOMPLETE     Seen and examined . I feel fine. DC planing with Abxs. 76 year old female from home with PMH of HTN, DM presents for worsening purulent discharge of perineal abscess. Of note, patient presented with perineal abscess and had an I/D on 10/1 in the ED and was discharged on keflex. Blood culture from 10/6 positive forMethicillin SENSITIVE Staphylococcus aureus (MSSA).   Diagnosed with MSSA bacteremia source likely due to perineal abscess. Was being followed by surgery and ID Dr Limon and treated with Ancef 1g iv q8 x 4 weeks until 11/06 and daily packing. No acute sx intervention needed at this time. PICC line placed 10/11. Repeat blood CX 10/9 final- negative.   CT Abdomen and Pelvis w/ IV Cont (10.06) demonstrates- Infectious process in the right gluteal fold medially with infiltration of subcutaneous fat and small air bubbles. There is no discrete drainable   collection Incidental note is made of cholelithiasis and choledocholithiasis without biliary ductal dilatation.   VERONICA w/Doppler (10.12)-  1. Normal mitral valve. Trace mitral regurgitation. 2. Normal trileaflet aortic valve. No aortic stenosis. Trivial aortic regurgitation. 3. Normal Left Ventricular SystolicFunction,  (EF = 55 to 60%) 4. Normal right ventricular size and function. A catheter is visualized in the right heart. 5. Normal tricuspid valve. Trace tricuspid regurgitation. 6. Normal pulmonic valve. Trace pulmonic insufficiency is noted. 7. No pericardial effusion. 8. No obvious evidence of endocarditis.     Plan for  discharge on  11/6/22 as patient needs IV antibiotics until 11/6/22 and cannot afford services at home.  Discharge discussed with attending. This is a brief summery of patient encounter. Please refer to complete patient records for full details.   INCOMPLETE 10/21      SSA bacteremia.   ·  Plan: -  MSSA bacteremia secondary perineal Abscess  -  S/P I&D 10/1: culture positive staph aureus  -  Continue Ancef 1g iv q8  -  repeat CX negative   -  S/P TTE with no obvious vegetations noted  -  VERONICA no endocarditis noted  -  s/p PICC placement for 4 weeks of IV antibiotics, until 11/6  -  ID Dr. Limon.     Problem/Plan - 2:  ·  Problem: Perineal abscess.   ·  Plan: -  s/p I/D on 10/1 in ed  -  CT A/P showed right gluteal fold with infiltration w/o discrete drainable collection    -  Surgery followed No plans for acute surgical intervention   -  Continue daily packing   -  Continue antibiotics per ID: Ancef 1gm IV q8h until 11/6  -  ID Dr. Limon following.     Problem/Plan - 3:  ·  Problem: HTN (hypertension).   ·  Plan: -  controlled   -  Continue with  HCTZ  -  Continue with Losartan  -  Continue with Propranolol  - monitor blood pressures.     Problem/Plan - 4:  ·  Problem: Diabetes.   ·  Plan: -  HgbA1C 6.4 on admission  -  Monitor FS AC and HS  -  Sliding scale coverage as ordered  -  Consistent CHO diet.     Problem/Plan - 5:  ·  Problem: DVT prophylaxis.   ·  Plan: -  DVT prophylaxis with Lovenox  -  GI prophylaxis with Protonix.     Problem/Plan - 6:  ·  Problem: Discharge planning issues.   ·  Plan: -  Plan for discharge on  11/6/22  -  Patient needs IV antibiotics until 11/6/22 and cannot afford services at home.     76 year old female from home with PMH of HTN, DM presents for worsening purulent discharge of perineal abscess. Of note, patient presented with perineal abscess and had an I/D on 10/1 in the ED and was discharged on keflex. Blood culture from 10/6 positive forMethicillin SENSITIVE Staphylococcus aureus (MSSA).   Diagnosed with MSSA bacteremia source likely due to perineal abscess. Was being followed by surgery and ID Dr Limon and treated with Ancef 1g iv q8 x 4 weeks until 11/06 and daily packing. No acute sx intervention needed at this time. PICC line placed 10/11. Repeat blood CX 10/9 final- negative.   CT Abdomen and Pelvis w/ IV Cont (10.06) demonstrates- Infectious process in the right gluteal fold medially with infiltration of subcutaneous fat and small air bubbles. There is no discrete drainable   collection Incidental note is made of cholelithiasis and choledocholithiasis without biliary ductal dilatation.   VERONICA w/Doppler (10.12)-  1. Normal mitral valve. Trace mitral regurgitation. 2. Normal trileaflet aortic valve. No aortic stenosis. Trivial aortic regurgitation. 3. Normal Left Ventricular SystolicFunction,  (EF = 55 to 60%) 4. Normal right ventricular size and function. A catheter is visualized in the right heart. 5. Normal tricuspid valve. Trace tricuspid regurgitation. 6. Normal pulmonic valve. Trace pulmonic insufficiency is noted. 7. No pericardial effusion. 8. No obvious evidence of endocarditis.     Plan for  discharge on  11/6/22 as patient needs IV antibiotics until 11/6/22 and cannot afford services at home.       76 year old female from home with PMH of HTN, DM presents for worsening purulent discharge of perineal abscess. Of note, patient presented with perineal abscess and had an I/D on 10/1 in the ED and was discharged on keflex. Blood culture from 10/6 positive forMethicillin SENSITIVE Staphylococcus aureus (MSSA).   Diagnosed with MSSA bacteremia source likely due to perineal abscess. Was being followed by surgery and ID Dr Limon and treated with Ancef 1g iv q8 x 4 weeks until 11/06 and daily packing. No acute sx intervention needed at this time. PICC line placed 10/11. Repeat blood CX 10/9 final- negative.   CT Abdomen and Pelvis w/ IV Cont (10.06) demonstrates- Infectious process in the right gluteal fold medially with infiltration of subcutaneous fat and small air bubbles. There is no discrete drainable   collection Incidental note is made of cholelithiasis and choledocholithiasis without biliary ductal dilatation.   VERONICA w/Doppler (10.12)-  1. Normal mitral valve. Trace mitral regurgitation. 2. Normal trileaflet aortic valve. No aortic stenosis. Trivial aortic regurgitation. 3. Normal Left Ventricular SystolicFunction,  (EF = 55 to 60%) 4. Normal right ventricular size and function. A catheter is visualized in the right heart. 5. Normal tricuspid valve. Trace tricuspid regurgitation. 6. Normal pulmonic valve. Trace pulmonic insufficiency is noted. 7. No pericardial effusion. 8. No obvious evidence of endocarditis.   Plan for  discharge on  11/6/22 as patient needs IV antibiotics until 11/6/22 and cannot afford services at home.  Please note that this a brief summary of hospital course please refer to daily progress notes and consult notes for full course and events       76 year old female from home with PMH of HTN, DM presents for worsening purulent discharge of perineal abscess. Of note, patient presented with perineal abscess and had an I/D on 10/1 in the ED and was discharged on keflex. Blood culture from 10/6 positive forMethicillin SENSITIVE Staphylococcus aureus (MSSA).   Diagnosed with MSSA bacteremia source likely due to perineal abscess. Was being followed by surgery and ID Dr Limon and treated with Ancef 1g iv q8 x 4 weeks until 11/06 and daily packing. No acute sx intervention needed at this time. PICC line placed 10/11. Repeat blood CX 10/9 final- negative.   CT Abdomen and Pelvis w/ IV Cont (10.06) demonstrates- Infectious process in the right gluteal fold medially with infiltration of subcutaneous fat and small air bubbles. There is no discrete drainable   collection Incidental note is made of cholelithiasis and choledocholithiasis without biliary ductal dilatation.   VERONICA w/Doppler (10.12)-  1. Normal mitral valve. Trace mitral regurgitation. 2. Normal trileaflet aortic valve. No aortic stenosis. Trivial aortic regurgitation. 3. Normal Left Ventricular SystolicFunction,  (EF = 55 to 60%) 4. Normal right ventricular size and function. A catheter is visualized in the right heart. 5. Normal tricuspid valve. Trace tricuspid regurgitation. 6. Normal pulmonic valve. Trace pulmonic insufficiency is noted. 7. No pericardial effusion. 8. No obvious evidence of endocarditis.       You remained hospitalized for the completion of your IV antibiotics since you could not afford services at home.        Please note that this a brief summary of hospital course please refer to daily progress notes and consult notes for full course and events       76 year old female from home with PMH of HTN, DM presents for worsening purulent discharge of perineal abscess. Of note, patient presented with perineal abscess and had an I/D on 10/1 in the ED and was discharged on Keflex. Blood culture from 10/6 positive for Methicillin Sensitive Staphylococcus aureus (MSSA).     Diagnosed with MSSA bacteremia source likely due to perineal abscess.   Infectious Disease & Surgery followed.  Treated with Ancef and completed treatment on 11/5/22.  You received daily packing of your perineal abscess.  Your repeat blood cultures on 10/9 f were negative for infection.  You had a PICC line placed 10/11.........REMOVE!!!!     CT Abdomen and Pelvis w/ IV Cont (10.06) demonstrated and infectious process in the right gluteal fold medially with infiltration of subcutaneous fat and small air bubbles. There was no discrete drainable collection, therefore surgery recommended no intervention.  Incidentally, your CT showed Cholelithiasis and choledocholithiasis without biliary ductal dilatation.   VERONICA w/Doppler (10.12)-  1. Normal mitral valve. Trace mitral regurgitation. 2. Normal trileaflet aortic valve. No aortic stenosis. Trivial aortic regurgitation. 3. Normal Left Ventricular SystolicFunction,  (EF = 55 to 60%) 4. Normal right ventricular size and function. A catheter is visualized in the right heart. 5. Normal tricuspid valve. Trace tricuspid regurgitation. 6. Normal pulmonic valve. Trace pulmonic insufficiency is noted. 7. No pericardial effusion. 8. No obvious evidence of endocarditis.       You remained hospitalized for the completion of your IV antibiotics since you could not afford services at home.        Please note that this a brief summary of hospital course please refer to daily progress notes and consult notes for full course and events      incomplete until you remove PICC. 76 year old female from home with PMH of HTN, DM presents for worsening purulent discharge of perineal abscess. Of note, patient presented with perineal abscess and had an I/D on 10/1 in the ED and was discharged on Keflex. Blood culture from 10/6 positive for Methicillin Sensitive Staphylococcus aureus (MSSA).     Diagnosed with MSSA bacteremia source likely due to perineal abscess.   Infectious Disease & Surgery followed.  Treated with Ancef and completed treatment on 11/5/22.  You received daily packing of your perineal abscess.  Your repeat blood cultures on 10/9 f were negative for infection.  You had a PICC line placed 10/11.........REMOVE!!!!     CT Abdomen and Pelvis w/ IV Cont (10.06) demonstrated and infectious process in the right gluteal fold medially with infiltration of subcutaneous fat and small air bubbles. There was no discrete drainable collection, therefore surgery recommended no intervention.  Incidentally, your CT showed Cholelithiasis and choledocholithiasis without biliary ductal dilatation.   VERONICA w/Doppler (10.12)-  1. Normal mitral valve. Trace mitral regurgitation. 2. Normal trileaflet aortic valve. No aortic stenosis. Trivial aortic regurgitation. 3. Normal Left Ventricular SystolicFunction,  (EF = 55 to 60%) 4. Normal right ventricular size and function. A catheter is visualized in the right heart. 5. Normal tricuspid valve. Trace tricuspid regurgitation. 6. Normal pulmonic valve. Trace pulmonic insufficiency is noted. 7. No pericardial effusion. 8. No obvious evidence of endocarditis.     You remained hospitalized for the completion of your IV antibiotics since you could not afford services at home.    11/6- Pt evaluated at bedside with Dr. Lu and agreed with plan for d/c home after completion or her antibiotic this afternoon.  However, pt later requested to be d/c in AM stating that no one is able to pick her up this evening.      Please note that this a brief summary of hospital course please refer to daily progress notes and consult notes for full course and events      Incomplete until you remove PICC. 76 year old female from home with PMH of HTN, DM presents for worsening purulent discharge of perineal abscess. Of note, patient presented with perineal abscess and had an I/D on 10/1 in the ED and was discharged on Keflex. Blood culture from 10/6 positive for Methicillin Sensitive Staphylococcus aureus (MSSA).     Diagnosed with MSSA bacteremia source likely due to perineal abscess.   Infectious Disease & Surgery followed.  Treated with Ancef and completed treatment on 11/5/22.  You received daily packing of your perineal abscess.  Your repeat blood cultures on 10/9 f were negative for infection.  You had a PICC line placed 10/11 & removed 11/7/22    CT Abdomen and Pelvis w/ IV Cont (10.06) demonstrated and infectious process in the right gluteal fold medially with infiltration of subcutaneous fat and small air bubbles. There was no discrete drainable collection, therefore surgery recommended no intervention.  Incidentally, your CT showed Cholelithiasis and choledocholithiasis without biliary ductal dilatation.   VERONICA w/Doppler (10.12)-  1. Normal mitral valve. Trace mitral regurgitation. 2. Normal trileaflet aortic valve. No aortic stenosis. Trivial aortic regurgitation. 3. Normal Left Ventricular SystolicFunction,  (EF = 55 to 60%) 4. Normal right ventricular size and function. A catheter is visualized in the right heart. 5. Normal tricuspid valve. Trace tricuspid regurgitation. 6. Normal pulmonic valve. Trace pulmonic insufficiency is noted. 7. No pericardial effusion. 8. No obvious evidence of endocarditis.     You remained hospitalized for the completion of your IV antibiotics since you could not afford services at home.    11/6- Pt evaluated at bedside with Dr. Lu and agreed with plan for d/c home after completion or her antibiotic this afternoon.  However, pt later requested to be d/c in AM stating that no one is able to pick her up this evening.      Please note that this a brief summary of hospital course please refer to daily progress notes and consult notes for full course and events

## 2022-10-12 NOTE — DISCHARGE NOTE PROVIDER - NSDCCPCAREPLAN_GEN_ALL_CORE_FT
PRINCIPAL DISCHARGE DIAGNOSIS  Diagnosis: MSSA bacteremia  Assessment and Plan of Treatment:       SECONDARY DISCHARGE DIAGNOSES  Diagnosis: Diabetes  Assessment and Plan of Treatment:     Diagnosis: SERGEY (acute kidney injury)  Assessment and Plan of Treatment:     Diagnosis: HTN (hypertension)  Assessment and Plan of Treatment:     Diagnosis: Perineal abscess  Assessment and Plan of Treatment:     Diagnosis: Gluteal abscess  Assessment and Plan of Treatment:      PRINCIPAL DISCHARGE DIAGNOSIS  Diagnosis: MSSA bacteremia  Assessment and Plan of Treatment: You presented to the hospital for worsening purulent discharge of your perineal abscess. Blood culture from 10/6 positive for Methicillin SENSITIVE Staphylococcus aureus (MSSA). You were diagnosed with MSSA bacteremia source likely due to perineal abscess. You were seen by surgery and ID Dr Limon and treated with Ancef 1g iv q8 x 4 weeks until 11/06 and daily packing. No acute sx intervention needed at this time. You had a PICC line placed 10/11. Repeat blood CX 10/9 final were negative.   CT Abdomen and Pelvis w/ IV Cont (10.06) demonstrates- Infectious process in the right gluteal fold medially with infiltration of subcutaneous fat and small air bubbles. There is no discrete drainable   collection Incidental note is made of cholelithiasis and choledocholithiasis without biliary ductal dilatation.   VERONICA w/Doppler (10.12)-  1. Normal mitral valve. Trace mitral regurgitation. 2. Normal trileaflet aortic valve. No aortic stenosis. Trivial aortic regurgitation. 3. Normal Left Ventricular SystolicFunction,  (EF = 55 to 60%) 4. Normal right ventricular size and function. A catheter is visualized in the right heart. 5. Normal tricuspid valve. Trace tricuspid regurgitation. 6. Normal pulmonic valve. Trace pulmonic insufficiency is noted. 7. No pericardial effusion. 8. No obvious evidence of endocarditis.   Follow up with your PCP within 1 week of discharge.      SECONDARY DISCHARGE DIAGNOSES  Diagnosis: Gluteal abscess  Assessment and Plan of Treatment:     Diagnosis: Perineal abscess  Assessment and Plan of Treatment:     Diagnosis: HTN (hypertension)  Assessment and Plan of Treatment: You have hx of high blood pressure. Continue taking your prescribed blood pressure medications.    Diagnosis: Diabetes  Assessment and Plan of Treatment: You have hx of diabetes. Your A1C from 10/8 is 6.4. Continue with a consistent carb diet.    Diagnosis: SERGEY (acute kidney injury)  Assessment and Plan of Treatment:      PRINCIPAL DISCHARGE DIAGNOSIS  Diagnosis: MSSA bacteremia  Assessment and Plan of Treatment: You presented to the hospital for worsening purulent discharge of your perineal abscess. Blood culture from 10/6 positive for Methicillin SENSITIVE Staphylococcus aureus (MSSA). You were diagnosed with MSSA bacteremia source likely due to perineal abscess. You were seen by surgery and ID Dr Limon and treated with Ancef 1g iv q8 x 4 weeks until 11/06 and daily packing. No surgery  intervention needed at this time. You had a PICC line placed 10/11. Repeat blood cultures were done on 10/9 and  were negative.   CT Abdomen and Pelvis w/ IV Cont (10.06) demonstrates- Infectious process in the right gluteal fold medially with infiltration of subcutaneous fat and small air bubbles. There is no discrete drainable   collection Incidental note is made of cholelithiasis and choledocholithiasis without biliary ductal dilatation.   Follow up with your PCP within 1 week of discharge.      SECONDARY DISCHARGE DIAGNOSES  Diagnosis: HTN (hypertension)  Assessment and Plan of Treatment: Take all medication as prescribed.  Follow up with your medical doctor for routine blood pressure monitoring at your next visit.  Notify your doctor if you have any of the following symptoms:   Dizziness, Lightheadedness, Blurry vision, Headache, Chest pain, Shortness of breath      Diagnosis: Perineal abscess  Assessment and Plan of Treatment: you have a perineal abscess, the bacteremia likely originated from this abscess, you were treated with antibiotics  Continue dressing as recommended and follow up with PCP    Diagnosis: Diabetes  Assessment and Plan of Treatment: You have hx of diabetes. Your A1C from 10/8 is 6.4. Continue with a consistent carb diet.    Diagnosis: SERGEY (acute kidney injury)  Assessment and Plan of Treatment: your kidney function was elevated more than your baseline likley due to infection you were given IV fluids your kidney function improved and remained stable at your baseline       PRINCIPAL DISCHARGE DIAGNOSIS  Diagnosis: MSSA bacteremia  Assessment and Plan of Treatment: You presented to the hospital for worsening purulent discharge of your perineal abscess. Blood culture from 10/6 positive for Methicillin SENSITIVE Staphylococcus aureus (MSSA). You were diagnosed with MSSA bacteremia source likely due to perineal abscess. You were seen by surgery and ID Dr Limon and treated with Ancef 1g iv q8 x 4 weeks until 11/06 and daily packing. No surgery  intervention needed at this time. You had a PICC line placed 10/11. Repeat blood cultures were done on 10/9 and  were negative.   CT Abdomen and Pelvis w/ IV Cont (10.06) demonstrates- Infectious process in the right gluteal fold medially with infiltration of subcutaneous fat and small air bubbles. There is no discrete drainable   collection Incidental note is made of cholelithiasis and choledocholithiasis without biliary ductal dilatation.   Follow up with your PCP within 1 week of discharge.      SECONDARY DISCHARGE DIAGNOSES  Diagnosis: Perineal abscess  Assessment and Plan of Treatment: You have a perineal abscess, the bacteremia likely originated from this abscess, you were treated with antibiotics  Continue dressing as recommended and follow up with PCP    Diagnosis: HTN (hypertension)  Assessment and Plan of Treatment: Take all medication as prescribed.  Follow up with your medical doctor for routine blood pressure monitoring at your next visit.  Notify your doctor if you have any of the following symptoms:   Dizziness, Lightheadedness, Blurry vision, Headache, Chest pain, Shortness of breath      Diagnosis: Diabetes  Assessment and Plan of Treatment: You have hx of diabetes. Your A1C from 10/8 is 6.4. Continue with a consistent carb diet.    Diagnosis: SERGEY (acute kidney injury)  Assessment and Plan of Treatment: your kidney function was elevated more than your baseline likley due to infection you were given IV fluids your kidney function improved and remained stable at your baseline       PRINCIPAL DISCHARGE DIAGNOSIS  Diagnosis: MSSA bacteremia  Assessment and Plan of Treatment: You presented to the hospital for worsening purulent discharge of your perineal abscess. Blood culture from 10/6 positive for Methicillin SENSITIVE Staphylococcus aureus (MSSA). You were diagnosed with MSSA bacteremia source likely due to perineal abscess. You were seen by surgery and ID Dr Limon and treated with Ancef 1g iv q8 x 4 weeks until 11/06 and daily packing. No surgery  intervention needed at this time. You had a PICC line placed 10/11. Repeat blood cultures were done on 10/9 and  were negative.   CT Abdomen and Pelvis w/ IV Cont (10.06) demonstrates- Infectious process in the right gluteal fold medially with infiltration of subcutaneous fat and small air bubbles. There is no discrete drainable   collection Incidental note is made of cholelithiasis and choledocholithiasis without biliary ductal dilatation.   Follow up with your PCP within 1 week of discharge.      SECONDARY DISCHARGE DIAGNOSES  Diagnosis: Perineal abscess  Assessment and Plan of Treatment: You have a perineal abscess, the bacteremia likely originated from this abscess, you were treated with antibiotics.  Continue dressing as recommended and follow up with PCP within 1 week of discharge.    Diagnosis: HTN (hypertension)  Assessment and Plan of Treatment: Take all medication as prescribed.  Follow up with your PCP for routine blood pressure monitoring at your next visit.  Notify your doctor if you have any of the following symptoms:   Dizziness, Lightheadedness, Blurry vision, Headache, Chest pain, Shortness of breath      Diagnosis: Diabetes  Assessment and Plan of Treatment: You have history of diabetes. Your A1C from 10/8 is 6.4. Continue with a consistent carb diet.    Diagnosis: SERGEY (acute kidney injury)  Assessment and Plan of Treatment: Your kidney function was elevated more than your baseline likley due to infection you were given IV fluids your kidney function improved and remained stable at your baseline.

## 2022-10-12 NOTE — PROGRESS NOTE ADULT - PROBLEM SELECTOR PLAN 6
Pt from home  Will continue IV Abx via PICC x4 weeks until 11/9/22  NCM to follow for home services  COVID - 10/6

## 2022-10-12 NOTE — PROGRESS NOTE ADULT - SUBJECTIVE AND OBJECTIVE BOX
C A R D I O L O G Y  **********************************     DATE OF SERVICE: 10-12-22    Patient denies chest pain or shortness of breath.   Review of symptoms otherwise negative.    acetaminophen     Tablet .. 650 milliGRAM(s) Oral every 6 hours PRN  ceFAZolin   IVPB 1000 milliGRAM(s) IV Intermittent every 8 hours  chlorhexidine 2% Cloths 1 Application(s) Topical <User Schedule>  chlorhexidine 2% Cloths 1 Application(s) Topical daily  dextrose 5%. 1000 milliLiter(s) IV Continuous <Continuous>  dextrose 5%. 1000 milliLiter(s) IV Continuous <Continuous>  dextrose 50% Injectable 25 Gram(s) IV Push once  dextrose 50% Injectable 12.5 Gram(s) IV Push once  dextrose 50% Injectable 25 Gram(s) IV Push once  dextrose Oral Gel 15 Gram(s) Oral once PRN  enoxaparin Injectable 40 milliGRAM(s) SubCutaneous every 24 hours  gabapentin 100 milliGRAM(s) Oral two times a day  glucagon  Injectable 1 milliGRAM(s) IntraMuscular once  hydrochlorothiazide 25 milliGRAM(s) Oral daily  insulin lispro (ADMELOG) corrective regimen sliding scale   SubCutaneous three times a day before meals  insulin lispro (ADMELOG) corrective regimen sliding scale   SubCutaneous at bedtime  losartan 100 milliGRAM(s) Oral daily  montelukast 10 milliGRAM(s) Oral at bedtime  nystatin Powder 1 Application(s) Topical two times a day  pantoprazole    Tablet 40 milliGRAM(s) Oral before breakfast  pantoprazole    Tablet      propranolol  milliGRAM(s) Oral daily  sodium chloride 0.9% lock flush 10 milliLiter(s) IV Push every 1 hour PRN  sodium chloride 0.9%. 1000 milliLiter(s) IV Continuous <Continuous>                            12.1   8.61  )-----------( 358      ( 12 Oct 2022 06:01 )             37.8       Hemoglobin: 12.1 g/dL (10-12 @ 06:01)  Hemoglobin: 13.1 g/dL (10-11 @ 12:26)  Hemoglobin: 12.3 g/dL (10-10 @ 06:09)  Hemoglobin: 13.0 g/dL (10-09 @ 06:02)  Hemoglobin: 12.7 g/dL (10-08 @ 07:03)      10-12    138  |  104  |  23<H>  ----------------------------<  127<H>  3.9   |  26  |  0.93    Ca    9.2      12 Oct 2022 06:01      Creatinine Trend: 0.93<--, 0.86<--, 0.93<--, 0.76<--, 0.85<--, 0.95<--    COAGS:           T(C): 36.6 (10-12-22 @ 05:55), Max: 36.9 (10-11-22 @ 21:21)  HR: 58 (10-12-22 @ 05:55) (58 - 70)  BP: 133/91 (10-12-22 @ 05:55) (128/83 - 133/91)  RR: 18 (10-12-22 @ 05:55) (17 - 18)  SpO2: 96% (10-12-22 @ 05:55) (91% - 96%)  Wt(kg): --    I&O's Summary      Gen: Appears well in NAD  HEENT:  (-)icterus (-)pallor  CV: N S1 S2 1/6 GAGE (+)2 Pulses B/l  Resp:  Clear to ausculatation B/L, normal effort  GI: (+) BS Soft, NT, ND  Lymph:  (-)Edema, (-)obvious lymphadenopathy  Skin: Warm to touch, Normal turgor  Psych: Appropriate mood and affect        ASSESSMENT/PLAN: 	76y  Female  PMH of HTN, DM is here for worsening of perineal abscess MSSA bacteremia echo with nrmal lV and RV function no vegetations.    - No vegetations on VERONICA  - s/p PICC  - complete Abx per ID  - No need for further inpatient cardiac work up.      Genaro Segura MD, [facility]  BEEPER [phone]

## 2022-10-12 NOTE — DIETITIAN INITIAL EVALUATION ADULT - PERTINENT MEDS FT
MEDICATIONS  (STANDING):  ceFAZolin   IVPB 1000 milliGRAM(s) IV Intermittent every 8 hours  chlorhexidine 2% Cloths 1 Application(s) Topical <User Schedule>  chlorhexidine 2% Cloths 1 Application(s) Topical daily  dextrose 5%. 1000 milliLiter(s) (100 mL/Hr) IV Continuous <Continuous>  dextrose 5%. 1000 milliLiter(s) (50 mL/Hr) IV Continuous <Continuous>  dextrose 50% Injectable 25 Gram(s) IV Push once  dextrose 50% Injectable 12.5 Gram(s) IV Push once  dextrose 50% Injectable 25 Gram(s) IV Push once  enoxaparin Injectable 40 milliGRAM(s) SubCutaneous every 24 hours  gabapentin 100 milliGRAM(s) Oral two times a day  glucagon  Injectable 1 milliGRAM(s) IntraMuscular once  hydrochlorothiazide 25 milliGRAM(s) Oral daily  insulin lispro (ADMELOG) corrective regimen sliding scale   SubCutaneous three times a day before meals  insulin lispro (ADMELOG) corrective regimen sliding scale   SubCutaneous at bedtime  losartan 100 milliGRAM(s) Oral daily  montelukast 10 milliGRAM(s) Oral at bedtime  nystatin Powder 1 Application(s) Topical two times a day  pantoprazole    Tablet 40 milliGRAM(s) Oral before breakfast  pantoprazole    Tablet      propranolol  milliGRAM(s) Oral daily  sodium chloride 0.9%. 1000 milliLiter(s) (80 mL/Hr) IV Continuous <Continuous>    MEDICATIONS  (PRN):  acetaminophen     Tablet .. 650 milliGRAM(s) Oral every 6 hours PRN Temp greater or equal to 38C (100.4F), Mild Pain (1 - 3)  dextrose Oral Gel 15 Gram(s) Oral once PRN Blood Glucose LESS THAN 70 milliGRAM(s)/deciliter  sodium chloride 0.9% lock flush 10 milliLiter(s) IV Push every 1 hour PRN Pre/post blood products, medications, blood draw, and to maintain line patency

## 2022-10-12 NOTE — PROGRESS NOTE ADULT - SUBJECTIVE AND OBJECTIVE BOX
NP Note discussed with  primary attending    Patient is a 76y old  Female who presents with a chief complaint of MSSA Bacteremia (11 Oct 2022 10:59)      INTERVAL HPI/OVERNIGHT EVENTS: plan for VERONICA this AM     MEDICATIONS  (STANDING):  ceFAZolin   IVPB 1000 milliGRAM(s) IV Intermittent every 8 hours  chlorhexidine 2% Cloths 1 Application(s) Topical <User Schedule>  chlorhexidine 2% Cloths 1 Application(s) Topical daily  dextrose 5%. 1000 milliLiter(s) (100 mL/Hr) IV Continuous <Continuous>  dextrose 5%. 1000 milliLiter(s) (50 mL/Hr) IV Continuous <Continuous>  dextrose 50% Injectable 25 Gram(s) IV Push once  dextrose 50% Injectable 12.5 Gram(s) IV Push once  dextrose 50% Injectable 25 Gram(s) IV Push once  enoxaparin Injectable 40 milliGRAM(s) SubCutaneous every 24 hours  gabapentin 100 milliGRAM(s) Oral two times a day  glucagon  Injectable 1 milliGRAM(s) IntraMuscular once  hydrochlorothiazide 25 milliGRAM(s) Oral daily  insulin lispro (ADMELOG) corrective regimen sliding scale   SubCutaneous three times a day before meals  insulin lispro (ADMELOG) corrective regimen sliding scale   SubCutaneous at bedtime  losartan 100 milliGRAM(s) Oral daily  montelukast 10 milliGRAM(s) Oral at bedtime  nystatin Powder 1 Application(s) Topical two times a day  pantoprazole    Tablet 40 milliGRAM(s) Oral before breakfast  pantoprazole    Tablet      propranolol  milliGRAM(s) Oral daily  sodium chloride 0.9%. 1000 milliLiter(s) (80 mL/Hr) IV Continuous <Continuous>    MEDICATIONS  (PRN):  acetaminophen     Tablet .. 650 milliGRAM(s) Oral every 6 hours PRN Temp greater or equal to 38C (100.4F), Mild Pain (1 - 3)  dextrose Oral Gel 15 Gram(s) Oral once PRN Blood Glucose LESS THAN 70 milliGRAM(s)/deciliter  sodium chloride 0.9% lock flush 10 milliLiter(s) IV Push every 1 hour PRN Pre/post blood products, medications, blood draw, and to maintain line patency      __________________________________________________  REVIEW OF SYSTEMS:    CONSTITUTIONAL: No fever,   EYES: no acute visual disturbances  NECK: No pain or stiffness  RESPIRATORY: No cough; No shortness of breath  CARDIOVASCULAR: No chest pain, no palpitations  GASTROINTESTINAL: No pain. No nausea or vomiting; No diarrhea   NEUROLOGICAL: No headache or numbness, no tremors  MUSCULOSKELETAL: No joint pain, no muscle pain  GENITOURINARY: no dysuria, no frequency, no hesitancy  PSYCHIATRY: no depression , no anxiety  ALL OTHER  ROS negative        Vital Signs Last 24 Hrs  T(C): 36.4 (12 Oct 2022 11:50), Max: 36.9 (11 Oct 2022 21:21)  T(F): 97.6 (12 Oct 2022 11:50), Max: 98.5 (11 Oct 2022 21:21)  HR: 58 (12 Oct 2022 11:50) (58 - 70)  BP: 146/84 (12 Oct 2022 11:50) (128/83 - 146/84)  BP(mean): --  RR: 17 (12 Oct 2022 11:50) (17 - 18)  SpO2: 96% (12 Oct 2022 11:50) (91% - 96%)    Parameters below as of 12 Oct 2022 11:50  Patient On (Oxygen Delivery Method): room air        ________________________________________________  PHYSICAL EXAM:  GENERAL: NAD  HEENT: Normocephalic;  conjunctivae and sclerae clear  NECK : supple  CHEST/LUNG: Clear to ausculitation bilaterally with good air entry   HEART: S1 S2  regular; no murmurs, gallops or rubs  ABDOMEN: Soft, Nontender, Nondistended; Bowel sounds present  EXTREMITIES: no cyanosis; no edema; no calf tenderness  SKIN: warm and dry; no rash  NERVOUS SYSTEM:  Awake and alert; Oriented  to place, person and time    _________________________________________________  LABS:                        12.1   8.61  )-----------( 358      ( 12 Oct 2022 06:01 )             37.8     10-12    138  |  104  |  23<H>  ----------------------------<  127<H>  3.9   |  26  |  0.93    Ca    9.2      12 Oct 2022 06:01          CAPILLARY BLOOD GLUCOSE      POCT Blood Glucose.: 141 mg/dL (12 Oct 2022 11:40)  POCT Blood Glucose.: 113 mg/dL (12 Oct 2022 08:14)  POCT Blood Glucose.: 119 mg/dL (11 Oct 2022 21:13)  POCT Blood Glucose.: 135 mg/dL (11 Oct 2022 16:48)        RADIOLOGY & ADDITIONAL TESTS:   < from: CT Abdomen and Pelvis w/ IV Cont (10.06.22 @ 17:44) >    IMPRESSION:  Infectious process in the right gluteal fold medially with infiltration   of subcutaneous fat and small air bubbles. There is no discrete drainable   collection  Incidental note is made of cholelithiasis and choledocholithiasis without   biliary ductal dilatation    --- End of Report ---      < end of copied text >    Imaging Personally Reviewed:  YES    Consultant(s) Notes Reviewed:   YES    Care Discussed with Consultants : TOMMY Limon    Plan of care was discussed with patient and /or primary care giver; all questions and concerns were addressed and care was aligned with patient's wishes.

## 2022-10-12 NOTE — DISCHARGE NOTE PROVIDER - NSDCMRMEDTOKEN_GEN_ALL_CORE_FT
amLODIPine 5 mg oral tablet: 1 tab(s) orally once a day  CBC and BMP every 2 weeks FAX results to Dr Limon [phone]: CBC and BMP every 2 weeks FAX results to Dr Limon [phone]  ceFAZolin: 1 gram(s) intravenous every 8 hours continue until Nov 9th 2022  Flush PICC line with 0.9% ns before and after infusion :   Fosamax 70 mg oral tablet: 1 tab(s) orally once a week  hydroCHLOROthiazide 25 mg oral tablet: 1 tab(s) orally once a day  lisinopril 20 mg oral tablet: 1 tab(s) orally once a day  losartan 100 mg oral tablet: 1 tab(s) orally once a day  metFORMIN 500 mg oral tablet: 1 tab(s) orally 2 times a day  pantoprazole 40 mg oral delayed release tablet: 1 tab(s) orally once a day  propranolol 160 mg oral capsule, extended release: 1 cap(s) orally once a day  Remove PICC line after last infusion Nov 9th 2022: Remove PICC line after last infusion Nov 9th 2022  simvastatin 10 mg oral tablet: 1 tab(s) orally once a day (at bedtime)  Singulair 10 mg oral tablet: 1 tab(s) orally once a day (in the evening)   acetaminophen 325 mg oral tablet: 2 tab(s) orally every 6 hours, As needed, Temp greater or equal to 38C (100.4F), Mild Pain (1 - 3)  amLODIPine 5 mg oral tablet: 1 tab(s) orally once a day  ceFAZolin: 1 gram(s) intravenous every 8 hours continue until Nov 9th 2022  Fosamax 70 mg oral tablet: 1 tab(s) orally once a week  hydroCHLOROthiazide 25 mg oral tablet: 1 tab(s) orally once a day  lisinopril 20 mg oral tablet: 1 tab(s) orally once a day  losartan 100 mg oral tablet: 1 tab(s) orally once a day  metFORMIN 500 mg oral tablet: 1 tab(s) orally 2 times a day  pantoprazole 40 mg oral delayed release tablet: 1 tab(s) orally once a day  propranolol 160 mg oral capsule, extended release: 1 cap(s) orally once a day  simvastatin 10 mg oral tablet: 1 tab(s) orally once a day (at bedtime)  Singulair 10 mg oral tablet: 1 tab(s) orally once a day (in the evening)   acetaminophen 325 mg oral tablet: 2 tab(s) orally every 6 hours, As needed, Temp greater or equal to 38C (100.4F), Mild Pain (1 - 3)  amLODIPine 5 mg oral tablet: 1 tab(s) orally once a day  Fosamax 70 mg oral tablet: 1 tab(s) orally once a week  hydroCHLOROthiazide 25 mg oral tablet: 1 tab(s) orally once a day  lisinopril 20 mg oral tablet: 1 tab(s) orally once a day  losartan 100 mg oral tablet: 1 tab(s) orally once a day  metFORMIN 500 mg oral tablet: 1 tab(s) orally 2 times a day  pantoprazole 40 mg oral delayed release tablet: 1 tab(s) orally once a day  propranolol 160 mg oral capsule, extended release: 1 cap(s) orally once a day  simvastatin 10 mg oral tablet: 1 tab(s) orally once a day (at bedtime)  Singulair 10 mg oral tablet: 1 tab(s) orally once a day (in the evening)

## 2022-10-12 NOTE — PROGRESS NOTE ADULT - ASSESSMENT
Right buttock abscess - s/p I&D 10/01  MSSA bacteremia - repeat BC are negative so far  R/o endocarditis    Plan:   ·	continue Ancef 1g iv q8h, will need until 11/06  ·	going for VERONICA today  ·	possible dc planning tomorrow nirm[facility] IV infusion services are set-up

## 2022-10-12 NOTE — PROGRESS NOTE ADULT - SUBJECTIVE AND OBJECTIVE BOX
Date of Service  : 10-12-22     INTERVAL HPI/OVERNIGHT EVENTS: No new concerns.   Vital Signs Last 24 Hrs  T(C): 36.7 (12 Oct 2022 21:01), Max: 36.7 (12 Oct 2022 21:01)  T(F): 98 (12 Oct 2022 21:01), Max: 98 (12 Oct 2022 21:01)  HR: 73 (12 Oct 2022 21:01) (58 - 73)  BP: 154/93 (12 Oct 2022 21:01) (133/91 - 154/93)  BP(mean): --  RR: 17 (12 Oct 2022 21:01) (17 - 18)  SpO2: 97% (12 Oct 2022 21:01) (96% - 97%)    Parameters below as of 12 Oct 2022 21:01  Patient On (Oxygen Delivery Method): room air      I&O's Summary    MEDICATIONS  (STANDING):  ceFAZolin   IVPB 1000 milliGRAM(s) IV Intermittent every 8 hours  chlorhexidine 2% Cloths 1 Application(s) Topical <User Schedule>  chlorhexidine 2% Cloths 1 Application(s) Topical daily  dextrose 5%. 1000 milliLiter(s) (100 mL/Hr) IV Continuous <Continuous>  dextrose 5%. 1000 milliLiter(s) (50 mL/Hr) IV Continuous <Continuous>  dextrose 50% Injectable 25 Gram(s) IV Push once  dextrose 50% Injectable 12.5 Gram(s) IV Push once  dextrose 50% Injectable 25 Gram(s) IV Push once  enoxaparin Injectable 40 milliGRAM(s) SubCutaneous every 24 hours  gabapentin 100 milliGRAM(s) Oral two times a day  glucagon  Injectable 1 milliGRAM(s) IntraMuscular once  hydrochlorothiazide 25 milliGRAM(s) Oral daily  insulin lispro (ADMELOG) corrective regimen sliding scale   SubCutaneous three times a day before meals  insulin lispro (ADMELOG) corrective regimen sliding scale   SubCutaneous at bedtime  losartan 100 milliGRAM(s) Oral daily  montelukast 10 milliGRAM(s) Oral at bedtime  nystatin Powder 1 Application(s) Topical two times a day  pantoprazole    Tablet      pantoprazole    Tablet 40 milliGRAM(s) Oral before breakfast  propranolol  milliGRAM(s) Oral daily  sodium chloride 0.9%. 1000 milliLiter(s) (80 mL/Hr) IV Continuous <Continuous>    MEDICATIONS  (PRN):  acetaminophen     Tablet .. 650 milliGRAM(s) Oral every 6 hours PRN Temp greater or equal to 38C (100.4F), Mild Pain (1 - 3)  aluminum hydroxide/magnesium hydroxide/simethicone Suspension 30 milliLiter(s) Oral every 4 hours PRN Dyspepsia  dextrose Oral Gel 15 Gram(s) Oral once PRN Blood Glucose LESS THAN 70 milliGRAM(s)/deciliter  sodium chloride 0.9% lock flush 10 milliLiter(s) IV Push every 1 hour PRN Pre/post blood products, medications, blood draw, and to maintain line patency    LABS:                        12.1   8.61  )-----------( 358      ( 12 Oct 2022 06:01 )             37.8     10-12    138  |  104  |  23<H>  ----------------------------<  127<H>  3.9   |  26  |  0.93    Ca    9.2      12 Oct 2022 06:01          CAPILLARY BLOOD GLUCOSE      POCT Blood Glucose.: 129 mg/dL (12 Oct 2022 21:08)  POCT Blood Glucose.: 119 mg/dL (12 Oct 2022 16:35)  POCT Blood Glucose.: 141 mg/dL (12 Oct 2022 11:40)  POCT Blood Glucose.: 113 mg/dL (12 Oct 2022 08:14)          REVIEW OF SYSTEMS:  CONSTITUTIONAL: No fever, weight loss, or fatigue  EYES: No eye pain, visual disturbances, or discharge  ENMT:  No difficulty hearing, tinnitus, vertigo; No sinus or throat pain  NECK: No pain or stiffness  RESPIRATORY: No cough, wheezing, chills or hemoptysis; No shortness of breath  CARDIOVASCULAR: No chest pain, palpitations, dizziness, or leg swelling  GASTROINTESTINAL: No abdominal or epigastric pain. No nausea, vomiting, or hematemesis; No diarrhea or constipation. No melena or hematochezia.  GENITOURINARY: No dysuria, frequency, hematuria, or incontinence      Consultant(s) Notes Reviewed:  [x ] YES  [ ] NO    PHYSICAL EXAM:  GENERAL: NAD, well-groomed, well-developed,not in any distress ,  HEAD:  Atraumatic, Normocephalic  EYES: EOMI, PERRLA, conjunctiva and sclera clear  ENMT: No tonsillar erythema, exudates, or enlargement; Moist mucous membranes, Good dentition, No lesions  NECK: Supple, No JVD, Normal thyroid  NERVOUS SYSTEM:  Alert & Oriented X3, No focal deficit   CHEST/LUNG: Good air entry bilateral with no  rales, rhonchi, wheezing, or rubs  HEART: Regular rate and rhythm; No murmurs, rubs, or gallops  ABDOMEN: Soft, Nontender, Nondistended; Bowel sounds present  EXTREMITIES:  2+ Peripheral Pulses, No clubbing, cyanosis, or edema  SKIN: No rashes or lesions    Care Discussed with Consultants/Other Providers [ x] YES  [ ] NO

## 2022-10-13 LAB
ANION GAP SERPL CALC-SCNC: 8 MMOL/L — SIGNIFICANT CHANGE UP (ref 5–17)
BUN SERPL-MCNC: 16 MG/DL — SIGNIFICANT CHANGE UP (ref 7–18)
CALCIUM SERPL-MCNC: 9.5 MG/DL — SIGNIFICANT CHANGE UP (ref 8.4–10.5)
CHLORIDE SERPL-SCNC: 103 MMOL/L — SIGNIFICANT CHANGE UP (ref 96–108)
CO2 SERPL-SCNC: 26 MMOL/L — SIGNIFICANT CHANGE UP (ref 22–31)
CREAT SERPL-MCNC: 0.74 MG/DL — SIGNIFICANT CHANGE UP (ref 0.5–1.3)
EGFR: 84 ML/MIN/1.73M2 — SIGNIFICANT CHANGE UP
GLUCOSE BLDC GLUCOMTR-MCNC: 100 MG/DL — HIGH (ref 70–99)
GLUCOSE BLDC GLUCOMTR-MCNC: 115 MG/DL — HIGH (ref 70–99)
GLUCOSE BLDC GLUCOMTR-MCNC: 133 MG/DL — HIGH (ref 70–99)
GLUCOSE BLDC GLUCOMTR-MCNC: 96 MG/DL — SIGNIFICANT CHANGE UP (ref 70–99)
GLUCOSE SERPL-MCNC: 109 MG/DL — HIGH (ref 70–99)
HCT VFR BLD CALC: 39.3 % — SIGNIFICANT CHANGE UP (ref 34.5–45)
HGB BLD-MCNC: 12.7 G/DL — SIGNIFICANT CHANGE UP (ref 11.5–15.5)
MCHC RBC-ENTMCNC: 31.4 PG — SIGNIFICANT CHANGE UP (ref 27–34)
MCHC RBC-ENTMCNC: 32.3 GM/DL — SIGNIFICANT CHANGE UP (ref 32–36)
MCV RBC AUTO: 97 FL — SIGNIFICANT CHANGE UP (ref 80–100)
NRBC # BLD: 0 /100 WBCS — SIGNIFICANT CHANGE UP (ref 0–0)
PLATELET # BLD AUTO: 364 K/UL — SIGNIFICANT CHANGE UP (ref 150–400)
POTASSIUM SERPL-MCNC: 4.2 MMOL/L — SIGNIFICANT CHANGE UP (ref 3.5–5.3)
POTASSIUM SERPL-SCNC: 4.2 MMOL/L — SIGNIFICANT CHANGE UP (ref 3.5–5.3)
RBC # BLD: 4.05 M/UL — SIGNIFICANT CHANGE UP (ref 3.8–5.2)
RBC # FLD: 13.4 % — SIGNIFICANT CHANGE UP (ref 10.3–14.5)
SARS-COV-2 RNA SPEC QL NAA+PROBE: SIGNIFICANT CHANGE UP
SODIUM SERPL-SCNC: 137 MMOL/L — SIGNIFICANT CHANGE UP (ref 135–145)
WBC # BLD: 9.09 K/UL — SIGNIFICANT CHANGE UP (ref 3.8–10.5)
WBC # FLD AUTO: 9.09 K/UL — SIGNIFICANT CHANGE UP (ref 3.8–10.5)

## 2022-10-13 RX ADMIN — Medication 160 MILLIGRAM(S): at 05:42

## 2022-10-13 RX ADMIN — GABAPENTIN 100 MILLIGRAM(S): 400 CAPSULE ORAL at 17:48

## 2022-10-13 RX ADMIN — Medication 25 MILLIGRAM(S): at 05:42

## 2022-10-13 RX ADMIN — ENOXAPARIN SODIUM 40 MILLIGRAM(S): 100 INJECTION SUBCUTANEOUS at 06:05

## 2022-10-13 RX ADMIN — PANTOPRAZOLE SODIUM 40 MILLIGRAM(S): 20 TABLET, DELAYED RELEASE ORAL at 05:42

## 2022-10-13 RX ADMIN — Medication 650 MILLIGRAM(S): at 22:42

## 2022-10-13 RX ADMIN — CHLORHEXIDINE GLUCONATE 1 APPLICATION(S): 213 SOLUTION TOPICAL at 05:43

## 2022-10-13 RX ADMIN — LOSARTAN POTASSIUM 100 MILLIGRAM(S): 100 TABLET, FILM COATED ORAL at 05:42

## 2022-10-13 RX ADMIN — NYSTATIN CREAM 1 APPLICATION(S): 100000 CREAM TOPICAL at 17:48

## 2022-10-13 RX ADMIN — NYSTATIN CREAM 1 APPLICATION(S): 100000 CREAM TOPICAL at 05:43

## 2022-10-13 RX ADMIN — Medication 100 MILLIGRAM(S): at 13:45

## 2022-10-13 RX ADMIN — Medication 650 MILLIGRAM(S): at 14:40

## 2022-10-13 RX ADMIN — Medication 100 MILLIGRAM(S): at 21:22

## 2022-10-13 RX ADMIN — CHLORHEXIDINE GLUCONATE 1 APPLICATION(S): 213 SOLUTION TOPICAL at 11:55

## 2022-10-13 RX ADMIN — Medication 650 MILLIGRAM(S): at 13:44

## 2022-10-13 RX ADMIN — GABAPENTIN 100 MILLIGRAM(S): 400 CAPSULE ORAL at 05:42

## 2022-10-13 RX ADMIN — Medication 650 MILLIGRAM(S): at 23:12

## 2022-10-13 RX ADMIN — MONTELUKAST 10 MILLIGRAM(S): 4 TABLET, CHEWABLE ORAL at 21:22

## 2022-10-13 RX ADMIN — Medication 100 MILLIGRAM(S): at 05:41

## 2022-10-13 NOTE — PROGRESS NOTE ADULT - SUBJECTIVE AND OBJECTIVE BOX
C A R D I O L O G Y  **********************************     DATE OF SERVICE: 10-13-22    Patient denies chest pain or shortness of breath.   Review of symptoms otherwise negative.    acetaminophen     Tablet .. 650 milliGRAM(s) Oral every 6 hours PRN  aluminum hydroxide/magnesium hydroxide/simethicone Suspension 30 milliLiter(s) Oral every 4 hours PRN  ceFAZolin   IVPB 1000 milliGRAM(s) IV Intermittent every 8 hours  chlorhexidine 2% Cloths 1 Application(s) Topical <User Schedule>  chlorhexidine 2% Cloths 1 Application(s) Topical daily  dextrose 5%. 1000 milliLiter(s) IV Continuous <Continuous>  dextrose 5%. 1000 milliLiter(s) IV Continuous <Continuous>  dextrose 50% Injectable 25 Gram(s) IV Push once  dextrose 50% Injectable 12.5 Gram(s) IV Push once  dextrose 50% Injectable 25 Gram(s) IV Push once  dextrose Oral Gel 15 Gram(s) Oral once PRN  enoxaparin Injectable 40 milliGRAM(s) SubCutaneous every 24 hours  gabapentin 100 milliGRAM(s) Oral two times a day  glucagon  Injectable 1 milliGRAM(s) IntraMuscular once  hydrochlorothiazide 25 milliGRAM(s) Oral daily  insulin lispro (ADMELOG) corrective regimen sliding scale   SubCutaneous three times a day before meals  insulin lispro (ADMELOG) corrective regimen sliding scale   SubCutaneous at bedtime  losartan 100 milliGRAM(s) Oral daily  montelukast 10 milliGRAM(s) Oral at bedtime  nystatin Powder 1 Application(s) Topical two times a day  pantoprazole    Tablet      pantoprazole    Tablet 40 milliGRAM(s) Oral before breakfast  propranolol  milliGRAM(s) Oral daily  sodium chloride 0.9% lock flush 10 milliLiter(s) IV Push every 1 hour PRN  sodium chloride 0.9%. 1000 milliLiter(s) IV Continuous <Continuous>                            12.7   9.09  )-----------( 364      ( 13 Oct 2022 07:25 )             39.3       Hemoglobin: 12.7 g/dL (10-13 @ 07:25)  Hemoglobin: 12.1 g/dL (10-12 @ 06:01)  Hemoglobin: 13.1 g/dL (10-11 @ 12:26)  Hemoglobin: 12.3 g/dL (10-10 @ 06:09)  Hemoglobin: 13.0 g/dL (10-09 @ 06:02)      10-13    137  |  103  |  16  ----------------------------<  109<H>  4.2   |  26  |  0.74    Ca    9.5      13 Oct 2022 07:25      Creatinine Trend: 0.74<--, 0.93<--, 0.86<--, 0.93<--, 0.76<--, 0.85<--    COAGS:           T(C): 36.4 (10-13-22 @ 12:12), Max: 36.7 (10-12-22 @ 21:01)  HR: 60 (10-13-22 @ 12:12) (60 - 80)  BP: 147/71 (10-13-22 @ 12:12) (145/79 - 154/93)  RR: 16 (10-13-22 @ 12:12) (16 - 17)  SpO2: 94% (10-13-22 @ 12:12) (94% - 97%)  Wt(kg): --    I&O's Summary      Gen: Appears well in NAD  HEENT:  (-)icterus (-)pallor  CV: N S1 S2 1/6 GAGE (+)2 Pulses B/l  Resp:  Clear to ausculatation B/L, normal effort  GI: (+) BS Soft, NT, ND  Lymph:  (-)Edema, (-)obvious lymphadenopathy  Skin: Warm to touch, Normal turgor  Psych: Appropriate mood and affect        ASSESSMENT/PLAN: 	76y  Female  PMH of HTN, DM is here for worsening of perineal abscess MSSA bacteremia echo with nrmal lV and RV function no vegetations.    - No vegetations on VERONICA  - s/p PICC  - complete Abx per ID  - No need for further inpatient cardiac work up.      Genaro Segura MD, [facility]  BEEPER [phone]

## 2022-10-13 NOTE — PROGRESS NOTE ADULT - PROBLEM SELECTOR PLAN 6
pt BIBA for lower back pain w/o any injury since this am discharge on 11/9, unable to afford antibiotics and no support at home

## 2022-10-13 NOTE — PROGRESS NOTE ADULT - ASSESSMENT
76 year old female from home with PMH of HTN, DM is here for worsening of perineal abscess. Patient presented with perineal abscess and had an I/D on 10/1 in the ED and was discharged on keflex. Presents with worsening purulent discharge from abscess, later found to + MSSA Bacteremia.SX and ID Braxton followed, no acute sx intervention needed at this time. Started on IV Ancef, repeat CX negative. S/P PICC placement, VERONICA this AM. Patient unable to afford outpatient IV home antibiotics and no support at home. Will need to stay until 11/9 for infusion.

## 2022-10-13 NOTE — PROGRESS NOTE ADULT - ASSESSMENT
Right buttock abscess - s/p I&D 10/01  MSSA bacteremia - repeat BC are negative   R/o'ed endocarditis - VERONICA showed no vegetations    Plan:   ·	continue Ancef 1g iv q8h, will need until 11/06  ·	possible dc planning today once patient is in agreeance of residual IV infusion service costs

## 2022-10-13 NOTE — PROGRESS NOTE ADULT - SUBJECTIVE AND OBJECTIVE BOX
76y Female is under our care for right buttock abscess (s/p I&D) and MSSA bacteremia. Patient underwent VERONICA yesterday and showed no vegetations. Patient continues to do well, but still c/o buttock abscess site itchiness and mild pain. Remains afebrile with normal wbc count. Possible dc planning today once there is resolution of IV infusion services costs.     MEDS:  ceFAZolin   IVPB 1000 milliGRAM(s) IV Intermittent every 8 hours    ALLERGIES: Allergies    codeine (Rash)    Intolerances    REVIEW OF SYSTEMS:  [  ] Not able to illicit  General: no fevers no malaise  Chest: no cough no sob  GI: no nvd  : no urinary sxs +mild buttock discomfort/ itchiness  Skin: no rashes  Musculoskeletal: no trauma no LBP  Neuro: no ha's no dizziness     VITALS:  Vital Signs Last 24 Hrs  T(C): 36.4 (10-13-22 @ 04:41), Max: 36.7 (10-12-22 @ 21:01)  T(F): 97.6 (10-13-22 @ 04:41), Max: 98 (10-12-22 @ 21:01)  HR: 80 (10-13-22 @ 06:52) (58 - 80)  BP: 145/79 (10-13-22 @ 06:52) (145/79 - 154/93)  BP(mean): --  RR: 16 (10-13-22 @ 04:41) (16 - 17)  SpO2: 96% (10-13-22 @ 04:41) (96% - 97%)    PHYSICAL EXAM:  HEENT: n/a  Neck: supple no LN's   Respiratory: lungs clear no rales  Cardiovascular: S1 S2 reg no murmurs  Gastrointestinal: +BS with soft, nondistended abdomen; nontender  Extremities: no edema, +left arm PICC line  Skin: right buttock abscess site has improved in presentation, there is decreased induration and tenderness upon palpation  Ortho: n/a  Neuro: awake and alert    LABS/DIAGNOSTIC TESTS:                          12.7   9.09  )-----------( 364      ( 13 Oct 2022 07:25 )             39.3   WBC Count: 9.09 K/uL (10-13 @ 07:25)  WBC Count: 8.61 K/uL (10-12 @ 06:01)  WBC Count: 7.93 K/uL (10-11 @ 12:26)  WBC Count: 8.22 K/uL (10-10 @ 06:09)  WBC Count: 7.24 K/uL (10-09 @ 06:02)    10-13    137  |  103  |  16  ----------------------------<  109<H>  4.2   |  26  |  0.74    Ca    9.5      13 Oct 2022 07:25        CULTURES:   .Blood Blood-Peripheral  10-09 @ 06:24   No growth to date.  --  --      .Blood Blood-Peripheral  10-09 @ 06:02   No growth to date.  --  --      .Blood Blood-Peripheral  10-06 @ 12:00   Growth in aerobic and anaerobic bottles: Staphylococcus aureus  See previous culture  [id number]  --  Blood Culture PCR  Staphylococcus aureus      .Abscess Leg - Right  10-01 @ 13:10   Numerous Staphylococcus aureus  --  Staphylococcus aureus        RADIOLOGY:  no new studies

## 2022-10-13 NOTE — PROGRESS NOTE ADULT - SUBJECTIVE AND OBJECTIVE BOX
NP Note discussed with  Primary Attending    Patient is a 76y old  Female who presents with a chief complaint of Cutaneous abscess of buttock     (12 Oct 2022 14:12)      INTERVAL HPI/OVERNIGHT EVENTS: no new complaints    MEDICATIONS  (STANDING):  ceFAZolin   IVPB 1000 milliGRAM(s) IV Intermittent every 8 hours  chlorhexidine 2% Cloths 1 Application(s) Topical <User Schedule>  chlorhexidine 2% Cloths 1 Application(s) Topical daily  dextrose 5%. 1000 milliLiter(s) (50 mL/Hr) IV Continuous <Continuous>  dextrose 5%. 1000 milliLiter(s) (100 mL/Hr) IV Continuous <Continuous>  dextrose 50% Injectable 25 Gram(s) IV Push once  dextrose 50% Injectable 12.5 Gram(s) IV Push once  dextrose 50% Injectable 25 Gram(s) IV Push once  enoxaparin Injectable 40 milliGRAM(s) SubCutaneous every 24 hours  gabapentin 100 milliGRAM(s) Oral two times a day  glucagon  Injectable 1 milliGRAM(s) IntraMuscular once  hydrochlorothiazide 25 milliGRAM(s) Oral daily  insulin lispro (ADMELOG) corrective regimen sliding scale   SubCutaneous three times a day before meals  insulin lispro (ADMELOG) corrective regimen sliding scale   SubCutaneous at bedtime  losartan 100 milliGRAM(s) Oral daily  montelukast 10 milliGRAM(s) Oral at bedtime  nystatin Powder 1 Application(s) Topical two times a day  pantoprazole    Tablet      pantoprazole    Tablet 40 milliGRAM(s) Oral before breakfast  propranolol  milliGRAM(s) Oral daily  sodium chloride 0.9%. 1000 milliLiter(s) (80 mL/Hr) IV Continuous <Continuous>    MEDICATIONS  (PRN):  acetaminophen     Tablet .. 650 milliGRAM(s) Oral every 6 hours PRN Temp greater or equal to 38C (100.4F), Mild Pain (1 - 3)  aluminum hydroxide/magnesium hydroxide/simethicone Suspension 30 milliLiter(s) Oral every 4 hours PRN Dyspepsia  dextrose Oral Gel 15 Gram(s) Oral once PRN Blood Glucose LESS THAN 70 milliGRAM(s)/deciliter  sodium chloride 0.9% lock flush 10 milliLiter(s) IV Push every 1 hour PRN Pre/post blood products, medications, blood draw, and to maintain line patency      __________________________________________________  REVIEW OF SYSTEMS:    CONSTITUTIONAL: No fever,   EYES: no acute visual disturbances  NECK: No pain or stiffness  RESPIRATORY: No cough; No shortness of breath  CARDIOVASCULAR: No chest pain, no palpitations  GASTROINTESTINAL: No pain. No nausea or vomiting; No diarrhea   NEUROLOGICAL: No headache or numbness, no tremors  MUSCULOSKELETAL: No joint pain, no muscle pain  GENITOURINARY: no dysuria, no frequency, no hesitancy  PSYCHIATRY: no depression , no anxiety  ALL OTHER  ROS negative        Vital Signs Last 24 Hrs  T(C): 36.4 (13 Oct 2022 12:12), Max: 36.7 (12 Oct 2022 21:01)  T(F): 97.6 (13 Oct 2022 12:12), Max: 98 (12 Oct 2022 21:01)  HR: 60 (13 Oct 2022 12:12) (60 - 80)  BP: 147/71 (13 Oct 2022 12:12) (145/79 - 154/93)  BP(mean): --  RR: 16 (13 Oct 2022 12:12) (16 - 17)  SpO2: 94% (13 Oct 2022 12:12) (94% - 97%)    Parameters below as of 13 Oct 2022 12:12  Patient On (Oxygen Delivery Method): room air        ________________________________________________  PHYSICAL EXAM:  GENERAL: NAD  HEENT: Normocephalic;  conjunctivae and sclerae clear; moist mucous membranes;   NECK : supple  CHEST/LUNG: Clear to auscultation bilaterally with good air entry   HEART: S1 S2  regular; no murmurs, gallops or rubs  ABDOMEN: Soft, Nontender, Nondistended; Bowel sounds present  EXTREMITIES: no cyanosis; no edema; no calf tenderness  SKIN: warm and dry; no rash  NERVOUS SYSTEM:  Awake and alert; Oriented  to place, person and time ; no new deficits    _________________________________________________  LABS:                        12.7   9.09  )-----------( 364      ( 13 Oct 2022 07:25 )             39.3     10-13    137  |  103  |  16  ----------------------------<  109<H>  4.2   |  26  |  0.74    Ca    9.5      13 Oct 2022 07:25          CAPILLARY BLOOD GLUCOSE      POCT Blood Glucose.: 133 mg/dL (13 Oct 2022 16:34)  POCT Blood Glucose.: 96 mg/dL (13 Oct 2022 11:46)  POCT Blood Glucose.: 100 mg/dL (13 Oct 2022 07:31)  POCT Blood Glucose.: 129 mg/dL (12 Oct 2022 21:08)        RADIOLOGY & ADDITIONAL TESTS:  < from: CT Abdomen and Pelvis w/ IV Cont (10.06.22 @ 17:44) >    FINDINGS:  LOWER CHEST: Within normal limits.    LIVER: Within normal limits. The hepatic dome is not completely included   on the images  BILE DUCTS: Punctate calcification in the common bile duct at the level   of the pancreatic head on image 47 of series 2.  GALLBLADDER: Cholelithiasis.  SPLEEN: Within normal limits.  PANCREAS: Within normal limits.  ADRENALS: Within normal limits.  KIDNEYS/URETERS: Within normal limits.    BLADDER: Within normal limits.  REPRODUCTIVE ORGANS: Hysterectomy.    BOWEL: No bowel obstruction. Appendix not identified and may be   surgically absent. There is infiltration of the medial soft tissues of   the right buttock with small air bubbles seen. There is no well-defined   collection.  PERITONEUM: No ascites.  VESSELS: Atherosclerotic changes.  RETROPERITONEUM/LYMPH NODES: No lymphadenopathy.  ABDOMINAL WALL: Surgical clips in the right inguinal region.. Small   fat-containing umbilical hernia  BONES: Degenerative changes. There is a moderate levoscoliosis in the   lumbar spine. Grade 1 anterolisthesis of L4 on L5    IMPRESSION:  Infectious process in the right gluteal fold medially with infiltration   of subcutaneous fat and small air bubbles. There is no discrete drainable   collection  Incidental note is made of cholelithiasis and choledocholithiasis without   biliary ductal dilatation    --- End of Report ---    < end of copied text >      Imaging  Reviewed:  YES    Consultant(s) Notes Reviewed:   YES      Plan of care was discussed with patient and /or primary care giver; all questions and concerns were addressed

## 2022-10-13 NOTE — PROGRESS NOTE ADULT - PROBLEM SELECTOR PLAN 1
MSSA bacteremia secondary perineal Abscess  S/P I&D 10/1: cx +staph aureus  Continue Ancef 1g iv q8  repeat CX negative   S/P TTE FOUND No obvious vegetations noted  VERONICA no endocarditis  s/p PICC placement for 4 weeks of IV antibiotics  ID Dr. Limon

## 2022-10-14 LAB
CULTURE RESULTS: SIGNIFICANT CHANGE UP
CULTURE RESULTS: SIGNIFICANT CHANGE UP
GLUCOSE BLDC GLUCOMTR-MCNC: 107 MG/DL — HIGH (ref 70–99)
GLUCOSE BLDC GLUCOMTR-MCNC: 108 MG/DL — HIGH (ref 70–99)
GLUCOSE BLDC GLUCOMTR-MCNC: 135 MG/DL — HIGH (ref 70–99)
GLUCOSE BLDC GLUCOMTR-MCNC: 95 MG/DL — SIGNIFICANT CHANGE UP (ref 70–99)
SPECIMEN SOURCE: SIGNIFICANT CHANGE UP
SPECIMEN SOURCE: SIGNIFICANT CHANGE UP

## 2022-10-14 RX ADMIN — MONTELUKAST 10 MILLIGRAM(S): 4 TABLET, CHEWABLE ORAL at 21:02

## 2022-10-14 RX ADMIN — Medication 100 MILLIGRAM(S): at 14:58

## 2022-10-14 RX ADMIN — NYSTATIN CREAM 1 APPLICATION(S): 100000 CREAM TOPICAL at 06:37

## 2022-10-14 RX ADMIN — CHLORHEXIDINE GLUCONATE 1 APPLICATION(S): 213 SOLUTION TOPICAL at 12:14

## 2022-10-14 RX ADMIN — Medication 100 MILLIGRAM(S): at 06:35

## 2022-10-14 RX ADMIN — NYSTATIN CREAM 1 APPLICATION(S): 100000 CREAM TOPICAL at 17:10

## 2022-10-14 RX ADMIN — GABAPENTIN 100 MILLIGRAM(S): 400 CAPSULE ORAL at 17:10

## 2022-10-14 RX ADMIN — PANTOPRAZOLE SODIUM 40 MILLIGRAM(S): 20 TABLET, DELAYED RELEASE ORAL at 06:36

## 2022-10-14 RX ADMIN — Medication 25 MILLIGRAM(S): at 06:36

## 2022-10-14 RX ADMIN — Medication 160 MILLIGRAM(S): at 06:37

## 2022-10-14 RX ADMIN — Medication 650 MILLIGRAM(S): at 22:16

## 2022-10-14 RX ADMIN — Medication 650 MILLIGRAM(S): at 21:00

## 2022-10-14 RX ADMIN — CHLORHEXIDINE GLUCONATE 1 APPLICATION(S): 213 SOLUTION TOPICAL at 06:35

## 2022-10-14 RX ADMIN — Medication 100 MILLIGRAM(S): at 21:02

## 2022-10-14 RX ADMIN — GABAPENTIN 100 MILLIGRAM(S): 400 CAPSULE ORAL at 06:39

## 2022-10-14 RX ADMIN — ENOXAPARIN SODIUM 40 MILLIGRAM(S): 100 INJECTION SUBCUTANEOUS at 06:36

## 2022-10-14 RX ADMIN — LOSARTAN POTASSIUM 100 MILLIGRAM(S): 100 TABLET, FILM COATED ORAL at 06:36

## 2022-10-14 NOTE — PROGRESS NOTE ADULT - SUBJECTIVE AND OBJECTIVE BOX
C A R D I O L O G Y  **********************************    DATE OF SERVICE: 10-14-22    Patient denies chest pain or shortness of breath.   Review of symptoms otherwise negative.    acetaminophen     Tablet .. 650 milliGRAM(s) Oral every 6 hours PRN  aluminum hydroxide/magnesium hydroxide/simethicone Suspension 30 milliLiter(s) Oral every 4 hours PRN  ceFAZolin   IVPB 1000 milliGRAM(s) IV Intermittent every 8 hours  chlorhexidine 2% Cloths 1 Application(s) Topical <User Schedule>  chlorhexidine 2% Cloths 1 Application(s) Topical daily  dextrose 5%. 1000 milliLiter(s) IV Continuous <Continuous>  dextrose 5%. 1000 milliLiter(s) IV Continuous <Continuous>  dextrose 50% Injectable 25 Gram(s) IV Push once  dextrose 50% Injectable 25 Gram(s) IV Push once  dextrose 50% Injectable 12.5 Gram(s) IV Push once  dextrose Oral Gel 15 Gram(s) Oral once PRN  enoxaparin Injectable 40 milliGRAM(s) SubCutaneous every 24 hours  gabapentin 100 milliGRAM(s) Oral two times a day  glucagon  Injectable 1 milliGRAM(s) IntraMuscular once  hydrochlorothiazide 25 milliGRAM(s) Oral daily  insulin lispro (ADMELOG) corrective regimen sliding scale   SubCutaneous three times a day before meals  insulin lispro (ADMELOG) corrective regimen sliding scale   SubCutaneous at bedtime  losartan 100 milliGRAM(s) Oral daily  montelukast 10 milliGRAM(s) Oral at bedtime  nystatin Powder 1 Application(s) Topical two times a day  pantoprazole    Tablet      pantoprazole    Tablet 40 milliGRAM(s) Oral before breakfast  propranolol  milliGRAM(s) Oral daily  sodium chloride 0.9% lock flush 10 milliLiter(s) IV Push every 1 hour PRN  sodium chloride 0.9%. 1000 milliLiter(s) IV Continuous <Continuous>                            12.7   9.09  )-----------( 364      ( 13 Oct 2022 07:25 )             39.3       Hemoglobin: 12.7 g/dL (10-13 @ 07:25)  Hemoglobin: 12.1 g/dL (10-12 @ 06:01)  Hemoglobin: 13.1 g/dL (10-11 @ 12:26)  Hemoglobin: 12.3 g/dL (10-10 @ 06:09)      10-13    137  |  103  |  16  ----------------------------<  109<H>  4.2   |  26  |  0.74    Ca    9.5      13 Oct 2022 07:25      Creatinine Trend: 0.74<--, 0.93<--, 0.86<--, 0.93<--, 0.76<--, 0.85<--    COAGS:           T(C): 36.5 (10-14-22 @ 06:16), Max: 36.5 (10-14-22 @ 06:16)  HR: 65 (10-14-22 @ 06:16) (60 - 65)  BP: 137/75 (10-14-22 @ 06:16) (137/75 - 147/71)  RR: 17 (10-14-22 @ 06:16) (16 - 17)  SpO2: 94% (10-14-22 @ 06:16) (94% - 95%)  Wt(kg): --    I&O's Summary        Gen: Appears well in NAD  HEENT:  (-)icterus (-)pallor  CV: N S1 S2 1/6 GAGE (+)2 Pulses B/l  Resp:  Clear to ausculatation B/L, normal effort  GI: (+) BS Soft, NT, ND  Lymph:  (-)Edema, (-)obvious lymphadenopathy  Skin: Warm to touch, Normal turgor  Psych: Appropriate mood and affect        ASSESSMENT/PLAN: 	76y  Female  PMH of HTN, DM is here for worsening of perineal abscess MSSA bacteremia echo with nrmal lV and RV function no vegetations.    - No vegetations on VERONICA  - no clinical findings to suggest endocarditis  - s/p PICC  - complete Abx per ID, to complete on 11/9  - No need for further inpatient cardiac work up.      Genaro Segura MD, [facility]  BEEPER [phone]

## 2022-10-14 NOTE — PROGRESS NOTE ADULT - SUBJECTIVE AND OBJECTIVE BOX
NP Note discussed with  Primary Attending    Patient is a 76y old  Female who presents with a chief complaint of Cutaneous abscess of buttock     (12 Oct 2022 14:12)      INTERVAL HPI/OVERNIGHT EVENTS: no new complaints    MEDICATIONS  (STANDING):  ceFAZolin   IVPB 1000 milliGRAM(s) IV Intermittent every 8 hours  chlorhexidine 2% Cloths 1 Application(s) Topical <User Schedule>  chlorhexidine 2% Cloths 1 Application(s) Topical daily  dextrose 5%. 1000 milliLiter(s) (50 mL/Hr) IV Continuous <Continuous>  dextrose 5%. 1000 milliLiter(s) (100 mL/Hr) IV Continuous <Continuous>  dextrose 50% Injectable 25 Gram(s) IV Push once  dextrose 50% Injectable 25 Gram(s) IV Push once  dextrose 50% Injectable 12.5 Gram(s) IV Push once  enoxaparin Injectable 40 milliGRAM(s) SubCutaneous every 24 hours  gabapentin 100 milliGRAM(s) Oral two times a day  glucagon  Injectable 1 milliGRAM(s) IntraMuscular once  hydrochlorothiazide 25 milliGRAM(s) Oral daily  insulin lispro (ADMELOG) corrective regimen sliding scale   SubCutaneous three times a day before meals  insulin lispro (ADMELOG) corrective regimen sliding scale   SubCutaneous at bedtime  losartan 100 milliGRAM(s) Oral daily  montelukast 10 milliGRAM(s) Oral at bedtime  nystatin Powder 1 Application(s) Topical two times a day  pantoprazole    Tablet      pantoprazole    Tablet 40 milliGRAM(s) Oral before breakfast  propranolol  milliGRAM(s) Oral daily  sodium chloride 0.9%. 1000 milliLiter(s) (80 mL/Hr) IV Continuous <Continuous>    MEDICATIONS  (PRN):  acetaminophen     Tablet .. 650 milliGRAM(s) Oral every 6 hours PRN Temp greater or equal to 38C (100.4F), Mild Pain (1 - 3)  aluminum hydroxide/magnesium hydroxide/simethicone Suspension 30 milliLiter(s) Oral every 4 hours PRN Dyspepsia  dextrose Oral Gel 15 Gram(s) Oral once PRN Blood Glucose LESS THAN 70 milliGRAM(s)/deciliter  sodium chloride 0.9% lock flush 10 milliLiter(s) IV Push every 1 hour PRN Pre/post blood products, medications, blood draw, and to maintain line patency      __________________________________________________  REVIEW OF SYSTEMS:    CONSTITUTIONAL: No fever,   EYES: no acute visual disturbances  NECK: No pain or stiffness  RESPIRATORY: No cough; No shortness of breath  CARDIOVASCULAR: No chest pain, no palpitations  GASTROINTESTINAL: No pain. No nausea or vomiting; No diarrhea   NEUROLOGICAL: No headache or numbness, no tremors  MUSCULOSKELETAL: No joint pain, no muscle pain  GENITOURINARY: no dysuria, no frequency, no hesitancy  PSYCHIATRY: no depression , no anxiety  ALL OTHER  ROS negative        Vital Signs Last 24 Hrs  T(C): 36.5 (14 Oct 2022 06:16), Max: 36.5 (14 Oct 2022 06:16)  T(F): 97.7 (14 Oct 2022 06:16), Max: 97.7 (14 Oct 2022 06:16)  HR: 65 (14 Oct 2022 06:16) (60 - 65)  BP: 137/75 (14 Oct 2022 06:16) (137/75 - 147/71)  BP(mean): --  RR: 17 (14 Oct 2022 06:16) (16 - 17)  SpO2: 94% (14 Oct 2022 06:16) (94% - 95%)    Parameters below as of 14 Oct 2022 06:16  Patient On (Oxygen Delivery Method): room air        ________________________________________________  PHYSICAL EXAM:  GENERAL: NAD  HEENT: Normocephalic;  conjunctivae and sclerae clear; moist mucous membranes;   NECK : supple  CHEST/LUNG: Clear to auscultation bilaterally with good air entry   HEART: S1 S2  regular; no murmurs, gallops or rubs  ABDOMEN: Soft, Nontender, Nondistended; Bowel sounds present  EXTREMITIES: no cyanosis; no edema; no calf tenderness  SKIN: warm and dry; no rash  NERVOUS SYSTEM:  Awake and alert; Oriented  to place, person and time ; no new deficits    _________________________________________________  LABS:                        12.7   9.09  )-----------( 364      ( 13 Oct 2022 07:25 )             39.3     10-13    137  |  103  |  16  ----------------------------<  109<H>  4.2   |  26  |  0.74    Ca    9.5      13 Oct 2022 07:25          CAPILLARY BLOOD GLUCOSE      POCT Blood Glucose.: 95 mg/dL (14 Oct 2022 07:49)  POCT Blood Glucose.: 115 mg/dL (13 Oct 2022 21:10)  POCT Blood Glucose.: 133 mg/dL (13 Oct 2022 16:34)        RADIOLOGY & ADDITIONAL TESTS:  < from: CT Abdomen and Pelvis w/ IV Cont (10.06.22 @ 17:44) >    FINDINGS:  LOWER CHEST: Within normal limits.    LIVER: Within normal limits. The hepatic dome is not completely included   on the images  BILE DUCTS: Punctate calcification in the common bile duct at the level   of the pancreatic head on image 47 of series 2.  GALLBLADDER: Cholelithiasis.  SPLEEN: Within normal limits.  PANCREAS: Within normal limits.  ADRENALS: Within normal limits.  KIDNEYS/URETERS: Within normal limits.    BLADDER: Within normal limits.  REPRODUCTIVE ORGANS: Hysterectomy.    BOWEL: No bowel obstruction. Appendix not identified and may be   surgically absent. There is infiltration of the medial soft tissues of   the right buttock with small air bubbles seen. There is no well-defined   collection.  PERITONEUM: No ascites.  VESSELS: Atherosclerotic changes.  RETROPERITONEUM/LYMPH NODES: No lymphadenopathy.  ABDOMINAL WALL: Surgical clips in the right inguinal region.. Small   fat-containing umbilical hernia  BONES: Degenerative changes. There is a moderate levoscoliosis in the   lumbar spine. Grade 1 anterolisthesis of L4 on L5    IMPRESSION:  Infectious process in the right gluteal fold medially with infiltration   of subcutaneous fat and small air bubbles. There is no discrete drainable   collection  Incidental note is made of cholelithiasis and choledocholithiasis without   biliary ductal dilatation    --- End of Report ---    < end of copied text >      Imaging  Reviewed:  YES    Consultant(s) Notes Reviewed:   YES      Plan of care was discussed with patient and /or primary care giver; all questions and concerns were addressed

## 2022-10-14 NOTE — PROGRESS NOTE ADULT - ASSESSMENT
76 year old female from home with PMH of HTN, DM is here for worsening of perineal abscess. Patient presented with perineal abscess and had an I/D on 10/1 in the ED and was discharged on keflex. Presents with worsening purulent discharge from abscess, later found to + MSSA Bacteremia.SX and ID Braxton followed, no acute sx intervention needed at this time. Started on IV Ancef, repeat CX negative. S/P PICC placement, VERONICA no evidence endocarditis. Patient unable to afford outpatient IV home antibiotics and no financial support at home.

## 2022-10-14 NOTE — PROGRESS NOTE ADULT - ASSESSMENT
76 year old female from home with PMH of HTN, DM is here for worsening of perineal abscess. Patient presented with perineal abscess and had an I/D on 10/1 in the ED and was discharged on keflex. Presents with worsening purulent discharge from abscess, later found to + MSSA Bacteremia.  SX and ID Braxton followed, no acute sx intervention needed at this time. Started on IV Ancef, repeat CX negative. Plan for VERONICA in AM and PICC placement for 4 weeks of ABX. NCM to follow for home infusion services      Problem/Plan - 1:  ·  Problem: MSSA bacteremia.   ·  Plan: + MSSA bacteremia    source likely perineal Abscess   S/P I&D 10/1: cx +staph aureus  Continue Ancef 1g iv q8  repeat CX negative   S/P TTE FOUND No obvious vegetations noted  VERONICA r/o endocarditis  S/P  PICC line and 4 weeks of IV antibiotics .  ID helping.   < from: VERONICA w/Doppler (10.12.22 @ 11:15) >  ------------------------------------------------------------------------  CONCLUSIONS:  1. Normal mitral valve. Trace mitral regurgitation.  2. Normal trileaflet aortic valve. No aortic stenosis.  Trivial aortic regurgitation.  3. Normal Left Ventricular SystolicFunction,  (EF = 55 to  60%)  4. Normal right ventricular size and function. A catheter  is visualized in the right heart.  5. Normal tricuspid valve. Trace tricuspid regurgitation.  6. Normal pulmonic valve. Trace pulmonic insufficiency is  noted.  7. No pericardial effusion.  8. No obvious evidence of endocarditis    < end of copied text >     Problem/Plan - 2:  ·  Problem: Perineal abscess.   ·  Plan: s/p I/D on 10/1  CT A/P showed right gluteal fold with infiltration w/o discrete drainable collection    Surgery followed No plans for acute surgical intervention   Continue daily packing   Continue abx per ID: Ancef 1gm IV q8h  ID Dr. Limon following.     Problem/Plan - 3:  ·  Problem: HTN (hypertension).   ·  Plan: border line   likely 2/2 to meds held due to parameters   will change parameters   continue HCTz, lisinopril + propanolol   Monitor BP.     Problem/Plan - 4:  ·  Problem: Diabetes.   ·  Plan: A1C 6.4  Monitor glucose ac/HS and cover with Admelog S/s.     Problem/Plan - 5:  ·  Problem: SERGEY (acute kidney injury).   ·  Plan: now resolved   SCR now WNL   avoid nephrotoxins   monitor BMP.     Problem/Plan - 6:  ·  Problem: DVT prophylaxis.   ·  Plan: dvt: Lovenox  gi: protonix.     Problem/Plan - 7:  ·  Problem: Discharge planning issues.   ·  Plan: Pt from home      Dispo : DC planning as Medically stable. Cant not afford to pay 108$  per week for infusion at home.

## 2022-10-14 NOTE — PROGRESS NOTE ADULT - SUBJECTIVE AND OBJECTIVE BOX
Date of Service  : 10-14-22     INTERVAL HPI/OVERNIGHT EVENTS: I feel fine but trying to find out with insurance company about the cost.   Vital Signs Last 24 Hrs  T(C): 36.7 (14 Oct 2022 12:00), Max: 36.7 (14 Oct 2022 12:00)  T(F): 98 (14 Oct 2022 12:00), Max: 98 (14 Oct 2022 12:00)  HR: 54 (14 Oct 2022 12:00) (54 - 65)  BP: 152/85 (14 Oct 2022 12:00) (137/75 - 152/85)  BP(mean): --  RR: 18 (14 Oct 2022 12:00) (16 - 18)  SpO2: 95% (14 Oct 2022 12:00) (94% - 95%)    Parameters below as of 14 Oct 2022 12:00  Patient On (Oxygen Delivery Method): room air      I&O's Summary    MEDICATIONS  (STANDING):  ceFAZolin   IVPB 1000 milliGRAM(s) IV Intermittent every 8 hours  chlorhexidine 2% Cloths 1 Application(s) Topical <User Schedule>  chlorhexidine 2% Cloths 1 Application(s) Topical daily  dextrose 5%. 1000 milliLiter(s) (50 mL/Hr) IV Continuous <Continuous>  dextrose 5%. 1000 milliLiter(s) (100 mL/Hr) IV Continuous <Continuous>  dextrose 50% Injectable 25 Gram(s) IV Push once  dextrose 50% Injectable 25 Gram(s) IV Push once  dextrose 50% Injectable 12.5 Gram(s) IV Push once  enoxaparin Injectable 40 milliGRAM(s) SubCutaneous every 24 hours  gabapentin 100 milliGRAM(s) Oral two times a day  glucagon  Injectable 1 milliGRAM(s) IntraMuscular once  hydrochlorothiazide 25 milliGRAM(s) Oral daily  insulin lispro (ADMELOG) corrective regimen sliding scale   SubCutaneous three times a day before meals  insulin lispro (ADMELOG) corrective regimen sliding scale   SubCutaneous at bedtime  losartan 100 milliGRAM(s) Oral daily  montelukast 10 milliGRAM(s) Oral at bedtime  nystatin Powder 1 Application(s) Topical two times a day  pantoprazole    Tablet      pantoprazole    Tablet 40 milliGRAM(s) Oral before breakfast  propranolol  milliGRAM(s) Oral daily  sodium chloride 0.9%. 1000 milliLiter(s) (80 mL/Hr) IV Continuous <Continuous>    MEDICATIONS  (PRN):  acetaminophen     Tablet .. 650 milliGRAM(s) Oral every 6 hours PRN Temp greater or equal to 38C (100.4F), Mild Pain (1 - 3)  aluminum hydroxide/magnesium hydroxide/simethicone Suspension 30 milliLiter(s) Oral every 4 hours PRN Dyspepsia  dextrose Oral Gel 15 Gram(s) Oral once PRN Blood Glucose LESS THAN 70 milliGRAM(s)/deciliter  sodium chloride 0.9% lock flush 10 milliLiter(s) IV Push every 1 hour PRN Pre/post blood products, medications, blood draw, and to maintain line patency    LABS:                        12.7   9.09  )-----------( 364      ( 13 Oct 2022 07:25 )             39.3     10-13    137  |  103  |  16  ----------------------------<  109<H>  4.2   |  26  |  0.74    Ca    9.5      13 Oct 2022 07:25          CAPILLARY BLOOD GLUCOSE      POCT Blood Glucose.: 108 mg/dL (14 Oct 2022 12:17)  POCT Blood Glucose.: 95 mg/dL (14 Oct 2022 07:49)  POCT Blood Glucose.: 115 mg/dL (13 Oct 2022 21:10)  POCT Blood Glucose.: 133 mg/dL (13 Oct 2022 16:34)          REVIEW OF SYSTEMS:  CONSTITUTIONAL: No fever, weight loss, or fatigue  EYES: No eye pain, visual disturbances, or discharge  ENMT:  No difficulty hearing, tinnitus, vertigo; No sinus or throat pain  NECK: No pain or stiffness  RESPIRATORY: No cough, wheezing, chills or hemoptysis; No shortness of breath  CARDIOVASCULAR: No chest pain, palpitations, dizziness, or leg swelling  GASTROINTESTINAL: No abdominal or epigastric pain. No nausea, vomiting, or hematemesis; No diarrhea or constipation. No melena or hematochezia.  GENITOURINARY: No dysuria, frequency, hematuria, or incontinence  NEUROLOGICAL: No headaches, memory loss, loss of strength, numbness, or tremors      Consultant(s) Notes Reviewed:  [x ] YES  [ ] NO    PHYSICAL EXAM:  GENERAL: NAD, well-groomed, well-developed,not in any distress ,  HEAD:  Atraumatic, Normocephalic  NECK: Supple, No JVD, Normal thyroid  NERVOUS SYSTEM:  Alert & Oriented X3, No focal deficit   CHEST/LUNG: Good air entry bilateral with no  rales, rhonchi, wheezing, or rubs  HEART: Regular rate and rhythm; No murmurs, rubs, or gallops  ABDOMEN: Soft, Nontender, Nondistended; Bowel sounds present  EXTREMITIES:  2+ Peripheral Pulses, No clubbing, cyanosis, or edema    Care Discussed with Consultants/Other Providers [ x] YES  [ ] NO

## 2022-10-14 NOTE — PROGRESS NOTE ADULT - PROBLEM SELECTOR PLAN 6
discharge on 11/9, unable to afford antibiotics and no financial support at home- will need corinna or stay inpatient for whole duration

## 2022-10-15 LAB
ANION GAP SERPL CALC-SCNC: 7 MMOL/L — SIGNIFICANT CHANGE UP (ref 5–17)
BUN SERPL-MCNC: 21 MG/DL — HIGH (ref 7–18)
CALCIUM SERPL-MCNC: 8.9 MG/DL — SIGNIFICANT CHANGE UP (ref 8.4–10.5)
CHLORIDE SERPL-SCNC: 102 MMOL/L — SIGNIFICANT CHANGE UP (ref 96–108)
CO2 SERPL-SCNC: 26 MMOL/L — SIGNIFICANT CHANGE UP (ref 22–31)
CREAT SERPL-MCNC: 0.9 MG/DL — SIGNIFICANT CHANGE UP (ref 0.5–1.3)
EGFR: 66 ML/MIN/1.73M2 — SIGNIFICANT CHANGE UP
GLUCOSE BLDC GLUCOMTR-MCNC: 101 MG/DL — HIGH (ref 70–99)
GLUCOSE BLDC GLUCOMTR-MCNC: 103 MG/DL — HIGH (ref 70–99)
GLUCOSE BLDC GLUCOMTR-MCNC: 103 MG/DL — HIGH (ref 70–99)
GLUCOSE BLDC GLUCOMTR-MCNC: 124 MG/DL — HIGH (ref 70–99)
GLUCOSE SERPL-MCNC: 104 MG/DL — HIGH (ref 70–99)
HCT VFR BLD CALC: 39 % — SIGNIFICANT CHANGE UP (ref 34.5–45)
HGB BLD-MCNC: 12.5 G/DL — SIGNIFICANT CHANGE UP (ref 11.5–15.5)
MCHC RBC-ENTMCNC: 31.2 PG — SIGNIFICANT CHANGE UP (ref 27–34)
MCHC RBC-ENTMCNC: 32.1 GM/DL — SIGNIFICANT CHANGE UP (ref 32–36)
MCV RBC AUTO: 97.3 FL — SIGNIFICANT CHANGE UP (ref 80–100)
NRBC # BLD: 0 /100 WBCS — SIGNIFICANT CHANGE UP (ref 0–0)
PLATELET # BLD AUTO: 315 K/UL — SIGNIFICANT CHANGE UP (ref 150–400)
POTASSIUM SERPL-MCNC: 4.4 MMOL/L — SIGNIFICANT CHANGE UP (ref 3.5–5.3)
POTASSIUM SERPL-SCNC: 4.4 MMOL/L — SIGNIFICANT CHANGE UP (ref 3.5–5.3)
RBC # BLD: 4.01 M/UL — SIGNIFICANT CHANGE UP (ref 3.8–5.2)
RBC # FLD: 13.3 % — SIGNIFICANT CHANGE UP (ref 10.3–14.5)
SODIUM SERPL-SCNC: 135 MMOL/L — SIGNIFICANT CHANGE UP (ref 135–145)
WBC # BLD: 9.02 K/UL — SIGNIFICANT CHANGE UP (ref 3.8–10.5)
WBC # FLD AUTO: 9.02 K/UL — SIGNIFICANT CHANGE UP (ref 3.8–10.5)

## 2022-10-15 RX ADMIN — GABAPENTIN 100 MILLIGRAM(S): 400 CAPSULE ORAL at 05:24

## 2022-10-15 RX ADMIN — MONTELUKAST 10 MILLIGRAM(S): 4 TABLET, CHEWABLE ORAL at 21:22

## 2022-10-15 RX ADMIN — ENOXAPARIN SODIUM 40 MILLIGRAM(S): 100 INJECTION SUBCUTANEOUS at 05:29

## 2022-10-15 RX ADMIN — LOSARTAN POTASSIUM 100 MILLIGRAM(S): 100 TABLET, FILM COATED ORAL at 05:21

## 2022-10-15 RX ADMIN — PANTOPRAZOLE SODIUM 40 MILLIGRAM(S): 20 TABLET, DELAYED RELEASE ORAL at 06:05

## 2022-10-15 RX ADMIN — CHLORHEXIDINE GLUCONATE 1 APPLICATION(S): 213 SOLUTION TOPICAL at 13:23

## 2022-10-15 RX ADMIN — NYSTATIN CREAM 1 APPLICATION(S): 100000 CREAM TOPICAL at 05:24

## 2022-10-15 RX ADMIN — Medication 100 MILLIGRAM(S): at 05:20

## 2022-10-15 RX ADMIN — Medication 160 MILLIGRAM(S): at 05:21

## 2022-10-15 RX ADMIN — Medication 100 MILLIGRAM(S): at 13:22

## 2022-10-15 RX ADMIN — Medication 25 MILLIGRAM(S): at 05:21

## 2022-10-15 RX ADMIN — GABAPENTIN 100 MILLIGRAM(S): 400 CAPSULE ORAL at 17:11

## 2022-10-15 RX ADMIN — Medication 100 MILLIGRAM(S): at 21:22

## 2022-10-15 RX ADMIN — CHLORHEXIDINE GLUCONATE 1 APPLICATION(S): 213 SOLUTION TOPICAL at 05:29

## 2022-10-15 NOTE — PROGRESS NOTE ADULT - SUBJECTIVE AND OBJECTIVE BOX
DATE OF SERVICE: 10-15-22    Patient denies chest pain or shortness of breath.   Review of symptoms otherwise negative.    MEDICATIONS:  acetaminophen     Tablet .. 650 milliGRAM(s) Oral every 6 hours PRN  aluminum hydroxide/magnesium hydroxide/simethicone Suspension 30 milliLiter(s) Oral every 4 hours PRN  ceFAZolin   IVPB 1000 milliGRAM(s) IV Intermittent every 8 hours  chlorhexidine 2% Cloths 1 Application(s) Topical <User Schedule>  chlorhexidine 2% Cloths 1 Application(s) Topical daily  dextrose 5%. 1000 milliLiter(s) IV Continuous <Continuous>  dextrose 5%. 1000 milliLiter(s) IV Continuous <Continuous>  dextrose 50% Injectable 25 Gram(s) IV Push once  dextrose 50% Injectable 12.5 Gram(s) IV Push once  dextrose 50% Injectable 25 Gram(s) IV Push once  dextrose Oral Gel 15 Gram(s) Oral once PRN  enoxaparin Injectable 40 milliGRAM(s) SubCutaneous every 24 hours  gabapentin 100 milliGRAM(s) Oral two times a day  glucagon  Injectable 1 milliGRAM(s) IntraMuscular once  hydrochlorothiazide 25 milliGRAM(s) Oral daily  insulin lispro (ADMELOG) corrective regimen sliding scale   SubCutaneous three times a day before meals  insulin lispro (ADMELOG) corrective regimen sliding scale   SubCutaneous at bedtime  losartan 100 milliGRAM(s) Oral daily  montelukast 10 milliGRAM(s) Oral at bedtime  nystatin Powder 1 Application(s) Topical two times a day  pantoprazole    Tablet      pantoprazole    Tablet 40 milliGRAM(s) Oral before breakfast  propranolol  milliGRAM(s) Oral daily  sodium chloride 0.9% lock flush 10 milliLiter(s) IV Push every 1 hour PRN  sodium chloride 0.9%. 1000 milliLiter(s) IV Continuous <Continuous>      LABS:                        12.5   9.02  )-----------( 315      ( 15 Oct 2022 07:36 )             39.0       Hemoglobin: 12.5 g/dL (10-15 @ 07:36)  Hemoglobin: 12.7 g/dL (10-13 @ 07:25)  Hemoglobin: 12.1 g/dL (10-12 @ 06:01)  Hemoglobin: 13.1 g/dL (10-11 @ 12:26)      10-15    135  |  102  |  21<H>  ----------------------------<  104<H>  4.4   |  26  |  0.90    Ca    8.9      15 Oct 2022 07:36      Creatinine Trend: 0.90<--, 0.74<--, 0.93<--, 0.86<--, 0.93<--, 0.76<--    COAGS:     PHYSICAL EXAM:  T(C): 36.4 (10-15-22 @ 04:42), Max: 36.7 (10-14-22 @ 12:00)  HR: 52 (10-15-22 @ 04:42) (52 - 63)  BP: 140/91 (10-15-22 @ 04:42) (140/91 - 152/85)  RR: 18 (10-15-22 @ 04:42) (17 - 18)  SpO2: 94% (10-15-22 @ 04:42) (94% - 96%)  Wt(kg): --    I&O's Summary    Gen: Appears well in NAD  HEENT:  (-)icterus (-)pallor  CV: N S1 S2 1/6 GAGE (+)2 Pulses B/l  Resp:  Clear to ausculatation B/L, normal effort  GI: (+) BS Soft, NT, ND  Lymph:  (-)Edema, (-)obvious lymphadenopathy  Skin: Warm to touch, Normal turgor  Psych: Appropriate mood and affect    VERONICA:  CONCLUSIONS:  1. Normal mitral valve. Trace mitral regurgitation.  2. Normal trileaflet aortic valve. No aortic stenosis.  Trivial aortic regurgitation.  3. Normal Left Ventricular SystolicFunction,  (EF = 55 to  60%)  4. Normal right ventricular size and function. A catheter  is visualized in the right heart.  5. Normal tricuspid valve. Trace tricuspid regurgitation.  6. Normal pulmonic valve. Trace pulmonic insufficiency is  noted.  7. No pericardial effusion.  8. No obvious evidence of endocarditis    TTE:  CONCLUSIONS:  1. Mitral annular calcification.  2.  Trace aortic regurgitation.  3. Normal left ventricular internal dimensions and wall  thicknesses.  4. Endocardium not well visualized; grossly normal left  ventricular systolic function.  5. Grade I diastolic dysfunction (Impaired relaxation,  mild).  6. Normal right ventricular size and function.  7. No obvious vegetations noted.  Consider VERONICA if  clinically appropriate.            ASSESSMENT/PLAN: 	76y  Female  PMH of HTN, DM is here for worsening of perineal abscess MSSA bacteremia echo with nrmal lV and RV function no vegetations.    - No vegetations on VERONICA, blood cultures from 10/9 show no growth  - no clinical findings to suggest endocarditis  - s/p PICC  - complete Abx per ID, to complete on 11/9  - No need for further inpatient cardiac work up.    Jw Zhao MD  Pager: [phone]

## 2022-10-15 NOTE — PROGRESS NOTE ADULT - ASSESSMENT
76 year old female from home with PMH of HTN, DM is here for worsening of perineal abscess. Patient presented with perineal abscess and had an I/D on 10/1 in the ED and was discharged on keflex. Presents with worsening purulent discharge from abscess, later found to + MSSA Bacteremia.  SX and ID Braxton followed, no acute sx intervention needed at this time. Started on IV Ancef, repeat CX negative. Plan for VERONICA in AM and PICC placement for 4 weeks of ABX. NCM to follow for home infusion services      Problem/Plan - 1:  ·  Problem: MSSA bacteremia.   ·  Plan: + MSSA bacteremia    source likely perineal Abscess   S/P I&D 10/1: cx +staph aureus  Continue Ancef 1g iv q8  repeat CX negative   S/P TTE FOUND No obvious vegetations noted  VERONICA r/o endocarditis  S/P  PICC line and 4 weeks of IV antibiotics .  ID helping.   < from: VERONICA w/Doppler (10.12.22 @ 11:15) >  ------------------------------------------------------------------------  CONCLUSIONS:  1. Normal mitral valve. Trace mitral regurgitation.  2. Normal trileaflet aortic valve. No aortic stenosis.  Trivial aortic regurgitation.  3. Normal Left Ventricular SystolicFunction,  (EF = 55 to  60%)  4. Normal right ventricular size and function. A catheter  is visualized in the right heart.  5. Normal tricuspid valve. Trace tricuspid regurgitation.  6. Normal pulmonic valve. Trace pulmonic insufficiency is  noted.  7. No pericardial effusion.  8. No obvious evidence of endocarditis    < end of copied text >     Problem/Plan - 2:  ·  Problem: Perineal abscess.   ·  Plan: s/p I/D on 10/1  CT A/P showed right gluteal fold with infiltration w/o discrete drainable collection    Surgery followed No plans for acute surgical intervention   Continue daily packing   Continue abx per ID: Ancef 1gm IV q8h  ID Dr. Limon following.     Problem/Plan - 3:  ·  Problem: HTN (hypertension).   ·  Plan: border line   likely 2/2 to meds held due to parameters   will change parameters   continue HCTz, lisinopril + propanolol   Monitor BP.     Problem/Plan - 4:  ·  Problem: Diabetes.   ·  Plan: A1C 6.4  Monitor glucose ac/HS and cover with Admelog S/s.     Problem/Plan - 5:  ·  Problem: SERGEY (acute kidney injury).   ·  Plan: now resolved   SCR now WNL   avoid nephrotoxins   monitor BMP.     Problem/Plan - 6:  ·  Problem: DVT prophylaxis.   ·  Plan: dvt: Lovenox  gi: protonix.     Problem/Plan - 7:  ·  Problem: Discharge planning issues.   ·  Plan: Pt from home      Dispo : DC planning as Medically stable pending home infusion arrangements.

## 2022-10-15 NOTE — PROGRESS NOTE ADULT - SUBJECTIVE AND OBJECTIVE BOX
Date of Service  : 10-15-22     INTERVAL HPI/OVERNIGHT EVENTS: No new concerns.   Vital Signs Last 24 Hrs  T(C): 36.2 (15 Oct 2022 12:00), Max: 36.4 (15 Oct 2022 04:42)  T(F): 97.2 (15 Oct 2022 12:00), Max: 97.5 (15 Oct 2022 04:42)  HR: 56 (15 Oct 2022 12:00) (52 - 63)  BP: 138/80 (15 Oct 2022 12:00) (138/80 - 152/78)  BP(mean): --  RR: 18 (15 Oct 2022 12:00) (17 - 18)  SpO2: 94% (15 Oct 2022 12:00) (94% - 96%)    Parameters below as of 15 Oct 2022 12:00  Patient On (Oxygen Delivery Method): room air      I&O's Summary    MEDICATIONS  (STANDING):  ceFAZolin   IVPB 1000 milliGRAM(s) IV Intermittent every 8 hours  chlorhexidine 2% Cloths 1 Application(s) Topical <User Schedule>  chlorhexidine 2% Cloths 1 Application(s) Topical daily  dextrose 5%. 1000 milliLiter(s) (50 mL/Hr) IV Continuous <Continuous>  dextrose 5%. 1000 milliLiter(s) (100 mL/Hr) IV Continuous <Continuous>  dextrose 50% Injectable 25 Gram(s) IV Push once  dextrose 50% Injectable 12.5 Gram(s) IV Push once  dextrose 50% Injectable 25 Gram(s) IV Push once  enoxaparin Injectable 40 milliGRAM(s) SubCutaneous every 24 hours  gabapentin 100 milliGRAM(s) Oral two times a day  glucagon  Injectable 1 milliGRAM(s) IntraMuscular once  hydrochlorothiazide 25 milliGRAM(s) Oral daily  insulin lispro (ADMELOG) corrective regimen sliding scale   SubCutaneous three times a day before meals  insulin lispro (ADMELOG) corrective regimen sliding scale   SubCutaneous at bedtime  losartan 100 milliGRAM(s) Oral daily  montelukast 10 milliGRAM(s) Oral at bedtime  nystatin Powder 1 Application(s) Topical two times a day  pantoprazole    Tablet 40 milliGRAM(s) Oral before breakfast  pantoprazole    Tablet      propranolol  milliGRAM(s) Oral daily  sodium chloride 0.9%. 1000 milliLiter(s) (80 mL/Hr) IV Continuous <Continuous>    MEDICATIONS  (PRN):  acetaminophen     Tablet .. 650 milliGRAM(s) Oral every 6 hours PRN Temp greater or equal to 38C (100.4F), Mild Pain (1 - 3)  aluminum hydroxide/magnesium hydroxide/simethicone Suspension 30 milliLiter(s) Oral every 4 hours PRN Dyspepsia  dextrose Oral Gel 15 Gram(s) Oral once PRN Blood Glucose LESS THAN 70 milliGRAM(s)/deciliter  sodium chloride 0.9% lock flush 10 milliLiter(s) IV Push every 1 hour PRN Pre/post blood products, medications, blood draw, and to maintain line patency    LABS:                        12.5   9.02  )-----------( 315      ( 15 Oct 2022 07:36 )             39.0     10-15    135  |  102  |  21<H>  ----------------------------<  104<H>  4.4   |  26  |  0.90    Ca    8.9      15 Oct 2022 07:36          CAPILLARY BLOOD GLUCOSE      POCT Blood Glucose.: 124 mg/dL (15 Oct 2022 16:34)  POCT Blood Glucose.: 103 mg/dL (15 Oct 2022 12:01)  POCT Blood Glucose.: 101 mg/dL (15 Oct 2022 07:33)  POCT Blood Glucose.: 135 mg/dL (14 Oct 2022 21:29)          REVIEW OF SYSTEMS:  CONSTITUTIONAL: No fever, weight loss, or fatigue  EYES: No eye pain, visual disturbances, or discharge  ENMT:  No difficulty hearing, tinnitus, vertigo; No sinus or throat pain  NECK: No pain or stiffness  RESPIRATORY: No cough, wheezing, chills or hemoptysis; No shortness of breath  CARDIOVASCULAR: No chest pain, palpitations, dizziness, or leg swelling  GASTROINTESTINAL: No abdominal or epigastric pain. No nausea, vomiting, or hematemesis; No diarrhea or constipation. No melena or hematochezia.  GENITOURINARY: No dysuria, frequency, hematuria, or incontinence  NEUROLOGICAL: No headaches, memory loss, loss of strength, numbness, or tremors      Consultant(s) Notes Reviewed:  [x ] YES  [ ] NO    PHYSICAL EXAM:  GENERAL: NAD, well-groomed, well-developed,not in any distress ,  HEAD:  Atraumatic, Normocephalic  NECK: Supple, No JVD, Normal thyroid  NERVOUS SYSTEM:  Alert & Oriented X3, No focal deficit   CHEST/LUNG: Good air entry bilateral with no  rales, rhonchi, wheezing, or rubs  HEART: Regular rate and rhythm; No murmurs, rubs, or gallops  ABDOMEN: Soft, Nontender, Nondistended; Bowel sounds present  EXTREMITIES:  2+ Peripheral Pulses, No clubbing, cyanosis, or edema    Care Discussed with Consultants/Other Providers [ x] YES  [ ] NO

## 2022-10-16 LAB
ANION GAP SERPL CALC-SCNC: 9 MMOL/L — SIGNIFICANT CHANGE UP (ref 5–17)
BUN SERPL-MCNC: 21 MG/DL — HIGH (ref 7–18)
CALCIUM SERPL-MCNC: 9.5 MG/DL — SIGNIFICANT CHANGE UP (ref 8.4–10.5)
CHLORIDE SERPL-SCNC: 100 MMOL/L — SIGNIFICANT CHANGE UP (ref 96–108)
CO2 SERPL-SCNC: 27 MMOL/L — SIGNIFICANT CHANGE UP (ref 22–31)
CREAT SERPL-MCNC: 0.94 MG/DL — SIGNIFICANT CHANGE UP (ref 0.5–1.3)
EGFR: 63 ML/MIN/1.73M2 — SIGNIFICANT CHANGE UP
GLUCOSE BLDC GLUCOMTR-MCNC: 111 MG/DL — HIGH (ref 70–99)
GLUCOSE BLDC GLUCOMTR-MCNC: 112 MG/DL — HIGH (ref 70–99)
GLUCOSE BLDC GLUCOMTR-MCNC: 92 MG/DL — SIGNIFICANT CHANGE UP (ref 70–99)
GLUCOSE BLDC GLUCOMTR-MCNC: 96 MG/DL — SIGNIFICANT CHANGE UP (ref 70–99)
GLUCOSE SERPL-MCNC: 103 MG/DL — HIGH (ref 70–99)
HCT VFR BLD CALC: 37.9 % — SIGNIFICANT CHANGE UP (ref 34.5–45)
HGB BLD-MCNC: 12.4 G/DL — SIGNIFICANT CHANGE UP (ref 11.5–15.5)
MCHC RBC-ENTMCNC: 31.8 PG — SIGNIFICANT CHANGE UP (ref 27–34)
MCHC RBC-ENTMCNC: 32.7 GM/DL — SIGNIFICANT CHANGE UP (ref 32–36)
MCV RBC AUTO: 97.2 FL — SIGNIFICANT CHANGE UP (ref 80–100)
NRBC # BLD: 0 /100 WBCS — SIGNIFICANT CHANGE UP (ref 0–0)
PLATELET # BLD AUTO: 310 K/UL — SIGNIFICANT CHANGE UP (ref 150–400)
POTASSIUM SERPL-MCNC: 4 MMOL/L — SIGNIFICANT CHANGE UP (ref 3.5–5.3)
POTASSIUM SERPL-SCNC: 4 MMOL/L — SIGNIFICANT CHANGE UP (ref 3.5–5.3)
RBC # BLD: 3.9 M/UL — SIGNIFICANT CHANGE UP (ref 3.8–5.2)
RBC # FLD: 13.6 % — SIGNIFICANT CHANGE UP (ref 10.3–14.5)
SODIUM SERPL-SCNC: 136 MMOL/L — SIGNIFICANT CHANGE UP (ref 135–145)
WBC # BLD: 7.97 K/UL — SIGNIFICANT CHANGE UP (ref 3.8–10.5)
WBC # FLD AUTO: 7.97 K/UL — SIGNIFICANT CHANGE UP (ref 3.8–10.5)

## 2022-10-16 RX ADMIN — NYSTATIN CREAM 1 APPLICATION(S): 100000 CREAM TOPICAL at 17:33

## 2022-10-16 RX ADMIN — Medication 100 MILLIGRAM(S): at 22:12

## 2022-10-16 RX ADMIN — MONTELUKAST 10 MILLIGRAM(S): 4 TABLET, CHEWABLE ORAL at 22:12

## 2022-10-16 RX ADMIN — NYSTATIN CREAM 1 APPLICATION(S): 100000 CREAM TOPICAL at 05:31

## 2022-10-16 RX ADMIN — GABAPENTIN 100 MILLIGRAM(S): 400 CAPSULE ORAL at 17:43

## 2022-10-16 RX ADMIN — Medication 25 MILLIGRAM(S): at 05:24

## 2022-10-16 RX ADMIN — ENOXAPARIN SODIUM 40 MILLIGRAM(S): 100 INJECTION SUBCUTANEOUS at 05:30

## 2022-10-16 RX ADMIN — Medication 100 MILLIGRAM(S): at 13:19

## 2022-10-16 RX ADMIN — PANTOPRAZOLE SODIUM 40 MILLIGRAM(S): 20 TABLET, DELAYED RELEASE ORAL at 06:11

## 2022-10-16 RX ADMIN — CHLORHEXIDINE GLUCONATE 1 APPLICATION(S): 213 SOLUTION TOPICAL at 05:30

## 2022-10-16 RX ADMIN — Medication 100 MILLIGRAM(S): at 05:24

## 2022-10-16 RX ADMIN — Medication 160 MILLIGRAM(S): at 05:24

## 2022-10-16 RX ADMIN — CHLORHEXIDINE GLUCONATE 1 APPLICATION(S): 213 SOLUTION TOPICAL at 13:18

## 2022-10-16 RX ADMIN — LOSARTAN POTASSIUM 100 MILLIGRAM(S): 100 TABLET, FILM COATED ORAL at 05:25

## 2022-10-16 RX ADMIN — GABAPENTIN 100 MILLIGRAM(S): 400 CAPSULE ORAL at 05:25

## 2022-10-16 NOTE — PROGRESS NOTE ADULT - SUBJECTIVE AND OBJECTIVE BOX
DATE OF SERVICE: 10-16-22    Patient denies chest pain or shortness of breath.   Review of symptoms otherwise negative.    MEDICATIONS:  acetaminophen     Tablet .. 650 milliGRAM(s) Oral every 6 hours PRN  aluminum hydroxide/magnesium hydroxide/simethicone Suspension 30 milliLiter(s) Oral every 4 hours PRN  ceFAZolin   IVPB 1000 milliGRAM(s) IV Intermittent every 8 hours  chlorhexidine 2% Cloths 1 Application(s) Topical <User Schedule>  chlorhexidine 2% Cloths 1 Application(s) Topical daily  dextrose 5%. 1000 milliLiter(s) IV Continuous <Continuous>  dextrose 5%. 1000 milliLiter(s) IV Continuous <Continuous>  dextrose 50% Injectable 25 Gram(s) IV Push once  dextrose 50% Injectable 12.5 Gram(s) IV Push once  dextrose 50% Injectable 25 Gram(s) IV Push once  dextrose Oral Gel 15 Gram(s) Oral once PRN  enoxaparin Injectable 40 milliGRAM(s) SubCutaneous every 24 hours  gabapentin 100 milliGRAM(s) Oral two times a day  glucagon  Injectable 1 milliGRAM(s) IntraMuscular once  hydrochlorothiazide 25 milliGRAM(s) Oral daily  insulin lispro (ADMELOG) corrective regimen sliding scale   SubCutaneous three times a day before meals  insulin lispro (ADMELOG) corrective regimen sliding scale   SubCutaneous at bedtime  losartan 100 milliGRAM(s) Oral daily  montelukast 10 milliGRAM(s) Oral at bedtime  nystatin Powder 1 Application(s) Topical two times a day  pantoprazole    Tablet      pantoprazole    Tablet 40 milliGRAM(s) Oral before breakfast  propranolol  milliGRAM(s) Oral daily  sodium chloride 0.9% lock flush 10 milliLiter(s) IV Push every 1 hour PRN  sodium chloride 0.9%. 1000 milliLiter(s) IV Continuous <Continuous>      LABS:                        12.4   7.97  )-----------( 310      ( 16 Oct 2022 07:20 )             37.9       Hemoglobin: 12.4 g/dL (10-16 @ 07:20)  Hemoglobin: 12.5 g/dL (10-15 @ 07:36)  Hemoglobin: 12.7 g/dL (10-13 @ 07:25)  Hemoglobin: 12.1 g/dL (10-12 @ 06:01)  Hemoglobin: 13.1 g/dL (10-11 @ 12:26)      10-16    136  |  100  |  21<H>  ----------------------------<  103<H>  4.0   |  27  |  0.94    Ca    9.5      16 Oct 2022 07:20      Creatinine Trend: 0.94<--, 0.90<--, 0.74<--, 0.93<--, 0.86<--, 0.93<--    COAGS:     PHYSICAL EXAM:  T(C): 36.1 (10-16-22 @ 05:15), Max: 36.2 (10-15-22 @ 12:00)  HR: 56 (10-16-22 @ 05:15) (56 - 60)  BP: 157/95 (10-16-22 @ 05:15) (138/80 - 157/95)  RR: 18 (10-16-22 @ 05:15) (17 - 18)  SpO2: 95% (10-16-22 @ 05:15) (94% - 95%)  Wt(kg): --    I&O's Summary      Gen: Appears well in NAD  HEENT:  (-)icterus (-)pallor  CV: N S1 S2 1/6 GAGE (+)2 Pulses B/l  Resp:  Clear to ausculatation B/L, normal effort  GI: (+) BS Soft, NT, ND  Lymph:  (-)Edema, (-)obvious lymphadenopathy  Skin: Warm to touch, Normal turgor  Psych: Appropriate mood and affect    VERONICA:  CONCLUSIONS:  1. Normal mitral valve. Trace mitral regurgitation.  2. Normal trileaflet aortic valve. No aortic stenosis.  Trivial aortic regurgitation.  3. Normal Left Ventricular SystolicFunction,  (EF = 55 to  60%)  4. Normal right ventricular size and function. A catheter  is visualized in the right heart.  5. Normal tricuspid valve. Trace tricuspid regurgitation.  6. Normal pulmonic valve. Trace pulmonic insufficiency is  noted.  7. No pericardial effusion.  8. No obvious evidence of endocarditis    TTE:  CONCLUSIONS:  1. Mitral annular calcification.  2.  Trace aortic regurgitation.  3. Normal left ventricular internal dimensions and wall  thicknesses.  4. Endocardium not well visualized; grossly normal left  ventricular systolic function.  5. Grade I diastolic dysfunction (Impaired relaxation,  mild).  6. Normal right ventricular size and function.  7. No obvious vegetations noted.  Consider VERONICA if  clinically appropriate.            ASSESSMENT/PLAN: 	76y  Female  PMH of HTN, DM is here for worsening of perineal abscess MSSA bacteremia echo with nrmal lV and RV function no vegetations.    - No vegetations on VERONICA, blood cultures from 10/9 show no growth  - no clinical findings to suggest endocarditis  - s/p PICC on ceftrriaxone  - complete Abx per ID, to complete on 11/9  - No need for further inpatient cardiac work up.    Jw Zhao MD  Pager: [phone]

## 2022-10-16 NOTE — PROGRESS NOTE ADULT - ASSESSMENT
_________________________________________________________________________________________  ========>>  M E D I C A L   A T T E N D I N G    F O L L O W  U P  N O T E  <<=========  -----------------------------------------------------------------------------------------------------    - Patient seen and examined by me approximately sixty minutes ago.  - In summary,  MAYE STYLES is a 76y year old woman admitted with groin skin abscess   - Patient today overall doing ok, comfortable, eating OK. overall otherwise doing better no pain     ==================>> REVIEW OF SYSTEM <<=================    GEN: no fever, no chills, no pain  RESP: no SOB, no cough, no sputum  CVS: no chest pain, no palpitations, no edema  GI: no abdominal pain, no nausea, no constipation, no diarrhea  : no dysuria, no frequency, no hematuria  Neuro: no headache, no dizziness  Derm : no itching, no rash    ==================>> PHYSICAL EXAM <<=================    GEN: A&O X 3 , NAD , comfortable, pleasant, calm   HEENT: NCAT, PERRL, MMM, hearing intact  Neck: supple , no JVD appreciated  CVS: S1S2 , regular , No M/R/G appreciated  PULM: CTA B/L,  no W/R/R appreciated  ABD.: soft. non tender, non distended,  bowel sounds present  Extrem: intact pulses , no edema   PSYCH : normal mood,  not anxious                            ( Note Written / Date of service :  10-16-22 )    ==================>> MEDICATIONS <<====================    MEDICATIONS  (STANDING):  ceFAZolin   IVPB 1000 milliGRAM(s) IV Intermittent every 8 hours  chlorhexidine 2% Cloths 1 Application(s) Topical <User Schedule>  chlorhexidine 2% Cloths 1 Application(s) Topical daily  dextrose 5%. 1000 milliLiter(s) (50 mL/Hr) IV Continuous <Continuous>  dextrose 5%. 1000 milliLiter(s) (100 mL/Hr) IV Continuous <Continuous>  dextrose 50% Injectable 25 Gram(s) IV Push once  dextrose 50% Injectable 12.5 Gram(s) IV Push once  dextrose 50% Injectable 25 Gram(s) IV Push once  enoxaparin Injectable 40 milliGRAM(s) SubCutaneous every 24 hours  gabapentin 100 milliGRAM(s) Oral two times a day  glucagon  Injectable 1 milliGRAM(s) IntraMuscular once  hydrochlorothiazide 25 milliGRAM(s) Oral daily  insulin lispro (ADMELOG) corrective regimen sliding scale   SubCutaneous at bedtime  insulin lispro (ADMELOG) corrective regimen sliding scale   SubCutaneous three times a day before meals  losartan 100 milliGRAM(s) Oral daily  montelukast 10 milliGRAM(s) Oral at bedtime  nystatin Powder 1 Application(s) Topical two times a day  pantoprazole    Tablet 40 milliGRAM(s) Oral before breakfast  pantoprazole    Tablet      propranolol  milliGRAM(s) Oral daily  sodium chloride 0.9%. 1000 milliLiter(s) (80 mL/Hr) IV Continuous <Continuous>    MEDICATIONS  (PRN):  acetaminophen     Tablet .. 650 milliGRAM(s) Oral every 6 hours PRN Temp greater or equal to 38C (100.4F), Mild Pain (1 - 3)  aluminum hydroxide/magnesium hydroxide/simethicone Suspension 30 milliLiter(s) Oral every 4 hours PRN Dyspepsia  dextrose Oral Gel 15 Gram(s) Oral once PRN Blood Glucose LESS THAN 70 milliGRAM(s)/deciliter  sodium chloride 0.9% lock flush 10 milliLiter(s) IV Push every 1 hour PRN Pre/post blood products, medications, blood draw, and to maintain line patency    ___________  Active diet:  Diet, Consistent Carbohydrate w/Evening Snack  ___________________    ==================>> VITAL SIGNS <<==================    T(C): 36.1 (10-16-22 @ 05:15), Max: 36.2 (10-15-22 @ 12:00)  HR: 56 (10-16-22 @ 05:15) (56 - 60)  BP: 157/95 (10-16-22 @ 05:15) (138/80 - 157/95)  RR: 18 (10-16-22 @ 05:15) (17 - 18)  SpO2: 95% (10-16-22 @ 05:15) (94% - 95%)     POCT Blood Glucose.: 92 mg/dL (16 Oct 2022 11:40)  POCT Blood Glucose.: 96 mg/dL (16 Oct 2022 07:38)  POCT Blood Glucose.: 103 mg/dL (15 Oct 2022 21:12)  POCT Blood Glucose.: 124 mg/dL (15 Oct 2022 16:34)  POCT Blood Glucose.: 103 mg/dL (15 Oct 2022 12:01)     ==================>> LAB AND IMAGING <<==================                        12.4   7.97  )-----------( 310      ( 16 Oct 2022 07:20 )             37.9        10-16    136  |  100  |  21<H>  ----------------------------<  103<H>  4.0   |  27  |  0.94    Ca    9.5      16 Oct 2022 07:20    HgA1C:   (10-08-22)          (10-08-22)      6.4    ____________________________    M I C R O B I O L O G Y :    Culture - Blood (collected 09 Oct 2022 06:24)  Source: .Blood Blood-Peripheral  Final Report (14 Oct 2022 12:01):    No Growth Final    Culture - Blood (collected 09 Oct 2022 06:02)  Source: .Blood Blood-Peripheral  Final Report (14 Oct 2022 12:01):    No Growth Final    Culture - Blood (collected 06 Oct 2022 12:00)  Source: .Blood Blood-Peripheral  Gram Stain (07 Oct 2022 15:09):    Growth in anaerobic bottle:    Gram positive cocci in pairs    Growth in aerobic bottle:    Gram Positive Cocci in Clusters  Final Report (09 Oct 2022 09:16):    Growth in aerobic and anaerobic bottles: Staphylococcus aureus    ***Blood Panel PCR results on this specimen are available    approximately 3 hours after the Gram stain result.***    Gram stain, PCR, and/or culture results may not always    correspond dueto difference in methodologies.    ************************************************************    This PCR assay was performed by multiplex PCR. This    Assay tests for 66 bacterial and resistance gene targets.    Please refer to the [facility] test directory    at [email or web address] for details.  Organism: Blood Culture PCR  Staphylococcus aureus (09 Oct 2022 09:16)  Organism: Staphylococcus aureus (09 Oct 2022 09:16)    Sensitivities:      -  Ampicillin/Sulbactam: S <=8/4      -  Cefazolin: S <=4      -  Clindamycin: S <=0.25      -  Erythromycin: R >4      -  Gentamicin: S <=1 Should not be used as monotherapy      -  Oxacillin: S 1 Oxacillin predicts susceptibility for dicloxacillin, methicillin, and nafcillin      -  Penicillin: R >8      -  Rifampin: S <=1 Should not be used as monotherapy      -  Tetra/Doxy: S <=1      -  Trimethoprim/Sulfamethoxazole: S <=0.5/9.5      -  Vancomycin: S 2      Method Type: DILMA  Organism: Blood Culture PCR (09 Oct 2022 09:16)    Sensitivities:      -  Methicillin SENSITIVE Staphylococcus aureus (MSSA): Detec Any isolate of Staphylococcus aureus from a blood culture is NOT considered a contaminant.      Method Type: PCR    Culture - Blood (collected 06 Oct 2022 12:00)  Source: .Blood Blood-Peripheral  Gram Stain (07 Oct 2022 23:17):    Growth in anaerobic bottle: Gram Positive Cocci in Clusters    Growth in aerobic bottle: Gram Positive Cocci in Clusters  Final Report (09 Oct 2022 09:18):    Growth in aerobic and anaerobic bottles: Staphylococcus aureus    See previous culture  [id number]    ___________________________________________________________________________________  ===============>>  A S S E S S M E N T   A N D   P L A N <<===============  ------------------------------------------------------------------------------------------    · Assessment	  76 year old female from home with PMH of HTN, DM is here for worsening of perineal abscess. Patient presented with perineal abscess and had an I/D on 10/1 in the ED and was discharged on keflex. Presents with worsening purulent discharge from abscess, later found to + MSSA Bacteremia.  SX and ID Braxton followed, no acute sx intervention needed at this time. Started on IV Ancef, repeat CX negative. Plan for VERONICA in AM and PICC placement for 4 weeks of ABX. NCM to follow for home infusion services      Problem/Plan - 1:  ·  Problem: MSSA bacteremia.    source likely perineal Abscess   S/P I&D 10/1: cx +staph aureus  Continue Ancef 1g iv q8  repeat CX negative   S/P TTE FOUND No obvious vegetations noted  VERONICA r/o endocarditis  S/P  PICC line and 4 weeks of IV antibiotics .  ID helping.      Problem/Plan - 2:  ·  Problem: Perineal abscess.   ·  Plan: s/p I/D on 10/1  CT A/P showed right gluteal fold with infiltration w/o discrete drainable collection    Surgery followed No plans for acute surgical intervention   Continue daily packing   Continue abx per ID: Ancef 1gm IV q8h  ID Dr. Braxton uribe.     Problem/Plan - 3:  ·  Problem: HTN (hypertension).   ·  Plan: border line   likely 2/2 to meds held due to parameters   will change parameters   continue HCTz, lisinopril + propanolol   Monitor BP.     Problem/Plan - 4:  ·  Problem: Diabetes.   ·  Plan: A1C 6.4  Monitor glucose ac/HS and cover with Admelog S/s.     Problem/Plan - 5:  ·  Problem: SERGEY (acute kidney injury).   ·  Plan: now resolved   Creatinine:  0.94  <<==, 0.90  <<==, 0.74  <<==, 0.93  <<==, 0.86  <<==  SCR now WNL   avoid nephrotoxins   monitor BMP.     Problem/Plan - 6:  ·  Problem: DVT prophylaxis.   ·  Plan: dvt: Lovenox  gi: protonix.    Dispo : DC planning as Medically stable pending home infusion arrangements.       --------------------------------------------  Case discussed with pt  Education given on findings and plan of care  ___________________________  HBhumi Villanueva D.O.  Pager: [phone]

## 2022-10-17 LAB
GLUCOSE BLDC GLUCOMTR-MCNC: 102 MG/DL — HIGH (ref 70–99)
GLUCOSE BLDC GLUCOMTR-MCNC: 104 MG/DL — HIGH (ref 70–99)
GLUCOSE BLDC GLUCOMTR-MCNC: 107 MG/DL — HIGH (ref 70–99)
GLUCOSE BLDC GLUCOMTR-MCNC: 136 MG/DL — HIGH (ref 70–99)

## 2022-10-17 RX ADMIN — Medication 160 MILLIGRAM(S): at 05:19

## 2022-10-17 RX ADMIN — ENOXAPARIN SODIUM 40 MILLIGRAM(S): 100 INJECTION SUBCUTANEOUS at 05:22

## 2022-10-17 RX ADMIN — MONTELUKAST 10 MILLIGRAM(S): 4 TABLET, CHEWABLE ORAL at 21:49

## 2022-10-17 RX ADMIN — Medication 100 MILLIGRAM(S): at 14:00

## 2022-10-17 RX ADMIN — CHLORHEXIDINE GLUCONATE 1 APPLICATION(S): 213 SOLUTION TOPICAL at 11:36

## 2022-10-17 RX ADMIN — Medication 650 MILLIGRAM(S): at 20:36

## 2022-10-17 RX ADMIN — Medication 30 MILLILITER(S): at 23:42

## 2022-10-17 RX ADMIN — LOSARTAN POTASSIUM 100 MILLIGRAM(S): 100 TABLET, FILM COATED ORAL at 05:19

## 2022-10-17 RX ADMIN — Medication 25 MILLIGRAM(S): at 05:19

## 2022-10-17 RX ADMIN — GABAPENTIN 100 MILLIGRAM(S): 400 CAPSULE ORAL at 05:25

## 2022-10-17 RX ADMIN — GABAPENTIN 100 MILLIGRAM(S): 400 CAPSULE ORAL at 17:44

## 2022-10-17 RX ADMIN — Medication 650 MILLIGRAM(S): at 19:37

## 2022-10-17 RX ADMIN — NYSTATIN CREAM 1 APPLICATION(S): 100000 CREAM TOPICAL at 05:25

## 2022-10-17 RX ADMIN — PANTOPRAZOLE SODIUM 40 MILLIGRAM(S): 20 TABLET, DELAYED RELEASE ORAL at 06:04

## 2022-10-17 RX ADMIN — Medication 100 MILLIGRAM(S): at 05:18

## 2022-10-17 RX ADMIN — CHLORHEXIDINE GLUCONATE 1 APPLICATION(S): 213 SOLUTION TOPICAL at 05:26

## 2022-10-17 RX ADMIN — Medication 100 MILLIGRAM(S): at 21:49

## 2022-10-17 NOTE — PROGRESS NOTE ADULT - SUBJECTIVE AND OBJECTIVE BOX
NP Note discussed with  Primary Attending    Patient is a 76y old  Female who presents with a chief complaint of Cutaneous abscess of buttock      INTERVAL HPI / OVERNIGHT EVENTS: no new complaints      MEDICATIONS  (STANDING):  ceFAZolin   IVPB 1000 milliGRAM(s) IV Intermittent every 8 hours  chlorhexidine 2% Cloths 1 Application(s) Topical <User Schedule>  chlorhexidine 2% Cloths 1 Application(s) Topical daily  dextrose 5%. 1000 milliLiter(s) (100 mL/Hr) IV Continuous <Continuous>  dextrose 5%. 1000 milliLiter(s) (50 mL/Hr) IV Continuous <Continuous>  dextrose 50% Injectable 25 Gram(s) IV Push once  dextrose 50% Injectable 12.5 Gram(s) IV Push once  dextrose 50% Injectable 25 Gram(s) IV Push once  enoxaparin Injectable 40 milliGRAM(s) SubCutaneous every 24 hours  gabapentin 100 milliGRAM(s) Oral two times a day  glucagon  Injectable 1 milliGRAM(s) IntraMuscular once  hydrochlorothiazide 25 milliGRAM(s) Oral daily  insulin lispro (ADMELOG) corrective regimen sliding scale   SubCutaneous three times a day before meals  insulin lispro (ADMELOG) corrective regimen sliding scale   SubCutaneous at bedtime  losartan 100 milliGRAM(s) Oral daily  montelukast 10 milliGRAM(s) Oral at bedtime  nystatin Powder 1 Application(s) Topical two times a day  pantoprazole    Tablet 40 milliGRAM(s) Oral before breakfast  pantoprazole    Tablet      propranolol  milliGRAM(s) Oral daily  sodium chloride 0.9%. 1000 milliLiter(s) (80 mL/Hr) IV Continuous <Continuous>    MEDICATIONS  (PRN):  acetaminophen     Tablet .. 650 milliGRAM(s) Oral every 6 hours PRN Temp greater or equal to 38C (100.4F), Mild Pain (1 - 3)  aluminum hydroxide/magnesium hydroxide/simethicone Suspension 30 milliLiter(s) Oral every 4 hours PRN Dyspepsia  dextrose Oral Gel 15 Gram(s) Oral once PRN Blood Glucose LESS THAN 70 milliGRAM(s)/deciliter  sodium chloride 0.9% lock flush 10 milliLiter(s) IV Push every 1 hour PRN Pre/post blood products, medications, blood draw, and to maintain line patency        __________________________________________________  REVIEW OF SYSTEMS:    CONSTITUTIONAL: No fever,   EYES: no acute visual disturbances  NECK: No pain or stiffness  RESPIRATORY: No cough; No shortness of breath  CARDIOVASCULAR: No chest pain, no palpitations  GASTROINTESTINAL: No pain. No nausea or vomiting; No diarrhea   NEUROLOGICAL: No headache or numbness, no tremors  MUSCULOSKELETAL: No joint pain, no muscle pain  GENITOURINARY: no dysuria, no frequency, no hesitancy  PSYCHIATRY: no depression , no anxiety  ALL OTHER  ROS negative        Vital Signs Last 24 Hrs  T(C): 36.3 (17 Oct 2022 05:29), Max: 36.6 (16 Oct 2022 20:44)  T(F): 97.3 (17 Oct 2022 05:29), Max: 97.8 (16 Oct 2022 20:44)  HR: 59 (17 Oct 2022 05:29) (52 - 60)  BP: 146/85 (17 Oct 2022 05:29) (146/85 - 152/99)  BP(mean): --  RR: 18 (17 Oct 2022 05:29) (18 - 18)  SpO2: 94% (17 Oct 2022 05:29) (93% - 94%)    Parameters below as of 17 Oct 2022 05:29  Patient On (Oxygen Delivery Method): room air      ________________________________________________  PHYSICAL EXAM:  GENERAL: No acute distress  HEENT: Normocephalic;  conjunctivae and sclerae clear; moist mucous membranes;   NECK : supple.  No JVD noted  CHEST/LUNG: Clear to auscultation bilaterally with good air entry   HEART: S1 S2  regular; no murmurs  ABDOMEN: Soft, Nontender,  non distended.  +BS  EXTREMITIES: no cyanosis; no edema; no calf tenderness  SKIN: warm and dry; no rash  NERVOUS SYSTEM:  Awake and alert; Oriented  to place, person and time ; no new deficits    _________________________________________________                          12.4   7.97  )-----------( 310      ( 16 Oct 2022 07:20 )             37.9       10-16    136  |  100  |  21<H>  ----------------------------<  103<H>  4.0   |  27  |  0.94    Ca    9.5      16 Oct 2022 07:20        CAPILLARY BLOOD GLUCOSE      POCT Blood Glucose.: 104 mg/dL (17 Oct 2022 07:39)  POCT Blood Glucose.: 112 mg/dL (16 Oct 2022 20:56)  POCT Blood Glucose.: 111 mg/dL (16 Oct 2022 16:36)  POCT Blood Glucose.: 92 mg/dL (16 Oct 2022 11:40)        RADIOLOGY & ADDITIONAL TESTS:  < from: CT Abdomen and Pelvis w/ IV Cont (10.06.22 @ 17:44) >    FINDINGS:  LOWER CHEST: Within normal limits.    LIVER: Within normal limits. The hepatic dome is not completely included   on the images  BILE DUCTS: Punctate calcification in the common bile duct at the level   of the pancreatic head on image 47 of series 2.  GALLBLADDER: Cholelithiasis.  SPLEEN: Within normal limits.  PANCREAS: Within normal limits.  ADRENALS: Within normal limits.  KIDNEYS/URETERS: Within normal limits.    BLADDER: Within normal limits.  REPRODUCTIVE ORGANS: Hysterectomy.    BOWEL: No bowel obstruction. Appendix not identified and may be   surgically absent. There is infiltration of the medial soft tissues of   the right buttock with small air bubbles seen. There is no well-defined   collection.  PERITONEUM: No ascites.  VESSELS: Atherosclerotic changes.  RETROPERITONEUM/LYMPH NODES: No lymphadenopathy.  ABDOMINAL WALL: Surgical clips in the right inguinal region.. Small   fat-containing umbilical hernia  BONES: Degenerative changes. There is a moderate levoscoliosis in the   lumbar spine. Grade 1 anterolisthesis of L4 on L5    IMPRESSION:  Infectious process in the right gluteal fold medially with infiltration   of subcutaneous fat and small air bubbles. There is no discrete drainable   collection  Incidental note is made of cholelithiasis and choledocholithiasis without   biliary ductal dilatation    --- End of Report ---    < end of copied text >      Imaging  Reviewed:  YES    Consultant(s) Notes Reviewed:   YES      Plan of care was discussed with patient and /or primary care giver; all questions and concerns were addressed

## 2022-10-17 NOTE — PROGRESS NOTE ADULT - SUBJECTIVE AND OBJECTIVE BOX
C A R D I O L O G Y  **********************************     DATE OF SERVICE: 10-17-22    Patient denies chest pain or shortness of breath.   Review of symptoms otherwise negative.    acetaminophen     Tablet .. 650 milliGRAM(s) Oral every 6 hours PRN  aluminum hydroxide/magnesium hydroxide/simethicone Suspension 30 milliLiter(s) Oral every 4 hours PRN  ceFAZolin   IVPB 1000 milliGRAM(s) IV Intermittent every 8 hours  chlorhexidine 2% Cloths 1 Application(s) Topical <User Schedule>  chlorhexidine 2% Cloths 1 Application(s) Topical daily  dextrose 5%. 1000 milliLiter(s) IV Continuous <Continuous>  dextrose 5%. 1000 milliLiter(s) IV Continuous <Continuous>  dextrose 50% Injectable 25 Gram(s) IV Push once  dextrose 50% Injectable 12.5 Gram(s) IV Push once  dextrose 50% Injectable 25 Gram(s) IV Push once  dextrose Oral Gel 15 Gram(s) Oral once PRN  enoxaparin Injectable 40 milliGRAM(s) SubCutaneous every 24 hours  gabapentin 100 milliGRAM(s) Oral two times a day  glucagon  Injectable 1 milliGRAM(s) IntraMuscular once  hydrochlorothiazide 25 milliGRAM(s) Oral daily  insulin lispro (ADMELOG) corrective regimen sliding scale   SubCutaneous three times a day before meals  insulin lispro (ADMELOG) corrective regimen sliding scale   SubCutaneous at bedtime  losartan 100 milliGRAM(s) Oral daily  montelukast 10 milliGRAM(s) Oral at bedtime  nystatin Powder 1 Application(s) Topical two times a day  pantoprazole    Tablet 40 milliGRAM(s) Oral before breakfast  pantoprazole    Tablet      propranolol  milliGRAM(s) Oral daily  sodium chloride 0.9% lock flush 10 milliLiter(s) IV Push every 1 hour PRN  sodium chloride 0.9%. 1000 milliLiter(s) IV Continuous <Continuous>                            12.4   7.97  )-----------( 310      ( 16 Oct 2022 07:20 )             37.9       Hemoglobin: 12.4 g/dL (10-16 @ 07:20)  Hemoglobin: 12.5 g/dL (10-15 @ 07:36)  Hemoglobin: 12.7 g/dL (10-13 @ 07:25)      10-16    136  |  100  |  21<H>  ----------------------------<  103<H>  4.0   |  27  |  0.94    Ca    9.5      16 Oct 2022 07:20      Creatinine Trend: 0.94<--, 0.90<--, 0.74<--, 0.93<--, 0.86<--, 0.93<--    COAGS:           T(C): 36.3 (10-17-22 @ 05:29), Max: 36.6 (10-16-22 @ 20:44)  HR: 59 (10-17-22 @ 05:29) (52 - 60)  BP: 146/85 (10-17-22 @ 05:29) (146/85 - 152/99)  RR: 18 (10-17-22 @ 05:29) (18 - 18)  SpO2: 94% (10-17-22 @ 05:29) (93% - 94%)  Wt(kg): --    I&O's Summary      Gen: Appears well in NAD  HEENT:  (-)icterus (-)pallor  CV: N S1 S2 1/6 GAGE (+)2 Pulses B/l  Resp:  Clear to ausculatation B/L, normal effort  GI: (+) BS Soft, NT, ND  Lymph:  (-)Edema, (-)obvious lymphadenopathy  Skin: Warm to touch, Normal turgor  Psych: Appropriate mood and affect        ASSESSMENT/PLAN: 	76y  Female  PMH of HTN, DM is here for worsening of perineal abscess MSSA bacteremia echo with nrmal lV and RV function no vegetations.    - No vegetations on VERONICA  - s/p PICC  - No clinical findings to suggest endocarditis  - complete Abx per ID  - No need for further inpatient cardiac work up.      Genaro Segura MD, FACC  BEEPER [phone]

## 2022-10-17 NOTE — PROGRESS NOTE ADULT - SUBJECTIVE AND OBJECTIVE BOX
Date of Service  : 10-17-22     INTERVAL HPI/OVERNIGHT EVENTS: I feel fine.   Vital Signs Last 24 Hrs  T(C): 36.2 (17 Oct 2022 12:31), Max: 36.6 (16 Oct 2022 20:44)  T(F): 97.2 (17 Oct 2022 12:31), Max: 97.8 (16 Oct 2022 20:44)  HR: 55 (17 Oct 2022 12:31) (55 - 60)  BP: 143/83 (17 Oct 2022 12:31) (143/83 - 147/94)  BP(mean): --  RR: 18 (17 Oct 2022 12:31) (18 - 18)  SpO2: 96% (17 Oct 2022 12:31) (93% - 96%)    Parameters below as of 17 Oct 2022 12:31  Patient On (Oxygen Delivery Method): room air      I&O's Summary    MEDICATIONS  (STANDING):  ceFAZolin   IVPB 1000 milliGRAM(s) IV Intermittent every 8 hours  chlorhexidine 2% Cloths 1 Application(s) Topical <User Schedule>  chlorhexidine 2% Cloths 1 Application(s) Topical daily  dextrose 5%. 1000 milliLiter(s) (50 mL/Hr) IV Continuous <Continuous>  dextrose 5%. 1000 milliLiter(s) (100 mL/Hr) IV Continuous <Continuous>  dextrose 50% Injectable 25 Gram(s) IV Push once  dextrose 50% Injectable 12.5 Gram(s) IV Push once  dextrose 50% Injectable 25 Gram(s) IV Push once  enoxaparin Injectable 40 milliGRAM(s) SubCutaneous every 24 hours  gabapentin 100 milliGRAM(s) Oral two times a day  glucagon  Injectable 1 milliGRAM(s) IntraMuscular once  hydrochlorothiazide 25 milliGRAM(s) Oral daily  insulin lispro (ADMELOG) corrective regimen sliding scale   SubCutaneous three times a day before meals  insulin lispro (ADMELOG) corrective regimen sliding scale   SubCutaneous at bedtime  losartan 100 milliGRAM(s) Oral daily  montelukast 10 milliGRAM(s) Oral at bedtime  nystatin Powder 1 Application(s) Topical two times a day  pantoprazole    Tablet 40 milliGRAM(s) Oral before breakfast  pantoprazole    Tablet      propranolol  milliGRAM(s) Oral daily  sodium chloride 0.9%. 1000 milliLiter(s) (80 mL/Hr) IV Continuous <Continuous>    MEDICATIONS  (PRN):  acetaminophen     Tablet .. 650 milliGRAM(s) Oral every 6 hours PRN Temp greater or equal to 38C (100.4F), Mild Pain (1 - 3)  aluminum hydroxide/magnesium hydroxide/simethicone Suspension 30 milliLiter(s) Oral every 4 hours PRN Dyspepsia  dextrose Oral Gel 15 Gram(s) Oral once PRN Blood Glucose LESS THAN 70 milliGRAM(s)/deciliter  sodium chloride 0.9% lock flush 10 milliLiter(s) IV Push every 1 hour PRN Pre/post blood products, medications, blood draw, and to maintain line patency    LABS:                        12.4   7.97  )-----------( 310      ( 16 Oct 2022 07:20 )             37.9     10-16    136  |  100  |  21<H>  ----------------------------<  103<H>  4.0   |  27  |  0.94    Ca    9.5      16 Oct 2022 07:20          CAPILLARY BLOOD GLUCOSE      POCT Blood Glucose.: 102 mg/dL (17 Oct 2022 16:54)  POCT Blood Glucose.: 107 mg/dL (17 Oct 2022 11:25)  POCT Blood Glucose.: 104 mg/dL (17 Oct 2022 07:39)  POCT Blood Glucose.: 112 mg/dL (16 Oct 2022 20:56)          REVIEW OF SYSTEMS:  CONSTITUTIONAL: No fever, weight loss, or fatigue  EYES: No eye pain, visual disturbances, or discharge  ENMT:  No difficulty hearing, tinnitus, vertigo; No sinus or throat pain  NECK: No pain or stiffness  RESPIRATORY: No cough, wheezing, chills or hemoptysis; No shortness of breath  CARDIOVASCULAR: No chest pain, palpitations, dizziness, or leg swelling  GASTROINTESTINAL: No abdominal or epigastric pain. No nausea, vomiting, or hematemesis; No diarrhea or constipation. No melena or hematochezia.  GENITOURINARY: No dysuria, frequency, hematuria, or incontinence  NEUROLOGICAL: No headaches, memory loss, loss of strength, numbness, or tremors    Consultant(s) Notes Reviewed:  [x ] YES  [ ] NO    PHYSICAL EXAM:  GENERAL: NAD, well-groomed, well-developed,not in any distress ,  HEAD:  Atraumatic, Normocephalic  EYES: EOMI, PERRLA, conjunctiva and sclera clear  ENMT: No tonsillar erythema, exudates, or enlargement; Moist mucous membranes, Good dentition, No lesions  NECK: Supple, No JVD, Normal thyroid  NERVOUS SYSTEM:  Alert & Oriented X3, No focal deficit   CHEST/LUNG: Good air entry bilateral with no  rales, rhonchi, wheezing, or rubs  HEART: Regular rate and rhythm; No murmurs, rubs, or gallops  ABDOMEN: Soft, Nontender, Nondistended; Bowel sounds present  EXTREMITIES:  2+ Peripheral Pulses, No clubbing, cyanosis, or edema  SKIN: No rashes or lesions    Care Discussed with Consultants/Other Providers [ x] YES  [ ] NO

## 2022-10-17 NOTE — PROGRESS NOTE ADULT - PROBLEM SELECTOR PLAN 2
-  s/p I/D on 10/1  -  CT A/P showed right gluteal fold with infiltration w/o discrete drainable collection    -  Surgery followed No plans for acute surgical intervention   -  Continue daily packing   -  Continue antibiotics per ID: Ancef 1gm IV q8h until 11/9  -  ID Dr. Limon following

## 2022-10-17 NOTE — PROGRESS NOTE ADULT - PROBLEM SELECTOR PLAN 1
-  MSSA bacteremia secondary perineal Abscess  -  S/P I&D 10/1: culture positive staph aureus  -  Continue Ancef 1g iv q8  -  repeat CX negative   -  S/P TTE with no obvious vegetations noted  -  VERONICA no endocarditis noted  -  s/p PICC placement for 4 weeks of IV antibiotics, until 11/9  -  ID Dr. Limon

## 2022-10-17 NOTE — PROGRESS NOTE ADULT - PROBLEM SELECTOR PLAN 3
-  controlled while admitted  -  SBP 140s  -  Continue with  HCTZ  -  Continue with Losartan  -  Continue with Propranolol  -  Continue to monitor blood pressures routinely

## 2022-10-17 NOTE — PROGRESS NOTE ADULT - ASSESSMENT
76 year old female from home with PMH of HTN, DM is here for worsening of perineal abscess. Patient presented with perineal abscess and had an I/D on 10/1 in the ED and was discharged on keflex. Presents with worsening purulent discharge from abscess, later found to + MSSA Bacteremia.SX and ID Braxton followed, no acute sx intervention needed at this time. Started on IV Ancef, repeat CX negative. S/P PICC placement, VERONICA no evidence endocarditis. Patient unable to afford outpatient IV home antibiotics and no financial support at home.   Will remain inpatient for duration of IV antibiotic course until 11/9

## 2022-10-17 NOTE — PROGRESS NOTE ADULT - PROBLEM SELECTOR PLAN 4
-  HgbA1C 6.4 on admission  -  Monitor FS AC and HS  -  Sliding scale coverage as ordered  -  Consistent CHO diet

## 2022-10-17 NOTE — PROGRESS NOTE ADULT - PROBLEM SELECTOR PLAN 6
-  Plan for discharge on  11/9  -  Patient needs IV antibiotics until 11/9 and cannot afford services at home.  -  Patient has no financial support at home

## 2022-10-18 LAB
ANION GAP SERPL CALC-SCNC: 6 MMOL/L — SIGNIFICANT CHANGE UP (ref 5–17)
BUN SERPL-MCNC: 21 MG/DL — HIGH (ref 7–18)
CALCIUM SERPL-MCNC: 9.7 MG/DL — SIGNIFICANT CHANGE UP (ref 8.4–10.5)
CHLORIDE SERPL-SCNC: 101 MMOL/L — SIGNIFICANT CHANGE UP (ref 96–108)
CO2 SERPL-SCNC: 27 MMOL/L — SIGNIFICANT CHANGE UP (ref 22–31)
CREAT SERPL-MCNC: 0.94 MG/DL — SIGNIFICANT CHANGE UP (ref 0.5–1.3)
EGFR: 63 ML/MIN/1.73M2 — SIGNIFICANT CHANGE UP
GLUCOSE BLDC GLUCOMTR-MCNC: 100 MG/DL — HIGH (ref 70–99)
GLUCOSE BLDC GLUCOMTR-MCNC: 108 MG/DL — HIGH (ref 70–99)
GLUCOSE BLDC GLUCOMTR-MCNC: 110 MG/DL — HIGH (ref 70–99)
GLUCOSE BLDC GLUCOMTR-MCNC: 126 MG/DL — HIGH (ref 70–99)
GLUCOSE SERPL-MCNC: 108 MG/DL — HIGH (ref 70–99)
HCT VFR BLD CALC: 40.1 % — SIGNIFICANT CHANGE UP (ref 34.5–45)
HGB BLD-MCNC: 13 G/DL — SIGNIFICANT CHANGE UP (ref 11.5–15.5)
MCHC RBC-ENTMCNC: 31 PG — SIGNIFICANT CHANGE UP (ref 27–34)
MCHC RBC-ENTMCNC: 32.4 GM/DL — SIGNIFICANT CHANGE UP (ref 32–36)
MCV RBC AUTO: 95.7 FL — SIGNIFICANT CHANGE UP (ref 80–100)
NRBC # BLD: 0 /100 WBCS — SIGNIFICANT CHANGE UP (ref 0–0)
PLATELET # BLD AUTO: 311 K/UL — SIGNIFICANT CHANGE UP (ref 150–400)
POTASSIUM SERPL-MCNC: 4.2 MMOL/L — SIGNIFICANT CHANGE UP (ref 3.5–5.3)
POTASSIUM SERPL-SCNC: 4.2 MMOL/L — SIGNIFICANT CHANGE UP (ref 3.5–5.3)
RBC # BLD: 4.19 M/UL — SIGNIFICANT CHANGE UP (ref 3.8–5.2)
RBC # FLD: 13.4 % — SIGNIFICANT CHANGE UP (ref 10.3–14.5)
SODIUM SERPL-SCNC: 134 MMOL/L — LOW (ref 135–145)
WBC # BLD: 6.51 K/UL — SIGNIFICANT CHANGE UP (ref 3.8–10.5)
WBC # FLD AUTO: 6.51 K/UL — SIGNIFICANT CHANGE UP (ref 3.8–10.5)

## 2022-10-18 RX ADMIN — Medication 100 MILLIGRAM(S): at 05:45

## 2022-10-18 RX ADMIN — GABAPENTIN 100 MILLIGRAM(S): 400 CAPSULE ORAL at 18:44

## 2022-10-18 RX ADMIN — LOSARTAN POTASSIUM 100 MILLIGRAM(S): 100 TABLET, FILM COATED ORAL at 05:45

## 2022-10-18 RX ADMIN — Medication 650 MILLIGRAM(S): at 18:43

## 2022-10-18 RX ADMIN — PANTOPRAZOLE SODIUM 40 MILLIGRAM(S): 20 TABLET, DELAYED RELEASE ORAL at 05:55

## 2022-10-18 RX ADMIN — Medication 160 MILLIGRAM(S): at 05:46

## 2022-10-18 RX ADMIN — GABAPENTIN 100 MILLIGRAM(S): 400 CAPSULE ORAL at 05:45

## 2022-10-18 RX ADMIN — NYSTATIN CREAM 1 APPLICATION(S): 100000 CREAM TOPICAL at 18:44

## 2022-10-18 RX ADMIN — Medication 100 MILLIGRAM(S): at 21:07

## 2022-10-18 RX ADMIN — MONTELUKAST 10 MILLIGRAM(S): 4 TABLET, CHEWABLE ORAL at 21:07

## 2022-10-18 RX ADMIN — NYSTATIN CREAM 1 APPLICATION(S): 100000 CREAM TOPICAL at 06:35

## 2022-10-18 RX ADMIN — Medication 100 MILLIGRAM(S): at 14:29

## 2022-10-18 RX ADMIN — CHLORHEXIDINE GLUCONATE 1 APPLICATION(S): 213 SOLUTION TOPICAL at 05:46

## 2022-10-18 RX ADMIN — ENOXAPARIN SODIUM 40 MILLIGRAM(S): 100 INJECTION SUBCUTANEOUS at 05:51

## 2022-10-18 RX ADMIN — Medication 25 MILLIGRAM(S): at 05:45

## 2022-10-18 NOTE — PROGRESS NOTE ADULT - SUBJECTIVE AND OBJECTIVE BOX
NP Note discussed with  Primary Attending    Patient is a 76y old  Female who presents with a chief complaint of Cutaneous abscess of buttock      INTERVAL HPI / OVERNIGHT EVENTS: no new complaints      MEDICATIONS  (STANDING):  ceFAZolin   IVPB 1000 milliGRAM(s) IV Intermittent every 8 hours  chlorhexidine 2% Cloths 1 Application(s) Topical <User Schedule>  chlorhexidine 2% Cloths 1 Application(s) Topical daily  dextrose 5%. 1000 milliLiter(s) (100 mL/Hr) IV Continuous <Continuous>  dextrose 5%. 1000 milliLiter(s) (50 mL/Hr) IV Continuous <Continuous>  dextrose 50% Injectable 25 Gram(s) IV Push once  dextrose 50% Injectable 12.5 Gram(s) IV Push once  dextrose 50% Injectable 25 Gram(s) IV Push once  enoxaparin Injectable 40 milliGRAM(s) SubCutaneous every 24 hours  gabapentin 100 milliGRAM(s) Oral two times a day  glucagon  Injectable 1 milliGRAM(s) IntraMuscular once  hydrochlorothiazide 25 milliGRAM(s) Oral daily  insulin lispro (ADMELOG) corrective regimen sliding scale   SubCutaneous three times a day before meals  insulin lispro (ADMELOG) corrective regimen sliding scale   SubCutaneous at bedtime  losartan 100 milliGRAM(s) Oral daily  montelukast 10 milliGRAM(s) Oral at bedtime  nystatin Powder 1 Application(s) Topical two times a day  pantoprazole    Tablet 40 milliGRAM(s) Oral before breakfast  pantoprazole    Tablet      propranolol  milliGRAM(s) Oral daily  sodium chloride 0.9%. 1000 milliLiter(s) (80 mL/Hr) IV Continuous <Continuous>    MEDICATIONS  (PRN):  acetaminophen     Tablet .. 650 milliGRAM(s) Oral every 6 hours PRN Temp greater or equal to 38C (100.4F), Mild Pain (1 - 3)  aluminum hydroxide/magnesium hydroxide/simethicone Suspension 30 milliLiter(s) Oral every 4 hours PRN Dyspepsia  dextrose Oral Gel 15 Gram(s) Oral once PRN Blood Glucose LESS THAN 70 milliGRAM(s)/deciliter  sodium chloride 0.9% lock flush 10 milliLiter(s) IV Push every 1 hour PRN Pre/post blood products, medications, blood draw, and to maintain line patency        __________________________________________________  REVIEW OF SYSTEMS:    CONSTITUTIONAL: No fever,   EYES: no acute visual disturbances  NECK: No pain or stiffness  RESPIRATORY: No cough; No shortness of breath  CARDIOVASCULAR: No chest pain, no palpitations  GASTROINTESTINAL: No pain. No nausea or vomiting; No diarrhea   NEUROLOGICAL: No headache or numbness, no tremors  MUSCULOSKELETAL: No joint pain, no muscle pain  GENITOURINARY: no dysuria, no frequency, no hesitancy  PSYCHIATRY: no depression , no anxiety  ALL OTHER  ROS negative        Vital Signs Last 24 Hrs  T(C): 36.5 (18 Oct 2022 04:40), Max: 36.5 (17 Oct 2022 21:05)  T(F): 97.7 (18 Oct 2022 04:40), Max: 97.7 (17 Oct 2022 21:05)  HR: 62 (18 Oct 2022 04:40) (55 - 62)  BP: 155/90 (18 Oct 2022 04:40) (140/88 - 155/90)  BP(mean): --  RR: 18 (18 Oct 2022 04:40) (17 - 18)  SpO2: 94% (18 Oct 2022 04:40) (94% - 96%)    Parameters below as of 18 Oct 2022 04:40  Patient On (Oxygen Delivery Method): room air      ________________________________________________  PHYSICAL EXAM:  GENERAL: No acute distress  HEENT: Normocephalic;  conjunctivae and sclerae clear; moist mucous membranes;   NECK : supple.  No JVD noted  CHEST/LUNG: Clear to auscultation bilaterally with good air entry   HEART: S1 S2  regular; no murmurs  ABDOMEN: Soft, Nontender,  non distended.  +BS  EXTREMITIES: no cyanosis; no edema; no calf tenderness  SKIN: warm and dry; no rash  NERVOUS SYSTEM:  Awake and alert; Oriented  to place, person and time ; no new deficits    _________________________________________________                           13.0   6.51  )-----------( 311      ( 18 Oct 2022 07:03 )             40.1       10-18    134<L>  |  101  |  21<H>  ----------------------------<  108<H>  4.2   |  27  |  0.94    Ca    9.7      18 Oct 2022 07:03                              RADIOLOGY & ADDITIONAL TESTS:  < from: CT Abdomen and Pelvis w/ IV Cont (10.06.22 @ 17:44) >    FINDINGS:  LOWER CHEST: Within normal limits.    LIVER: Within normal limits. The hepatic dome is not completely included   on the images  BILE DUCTS: Punctate calcification in the common bile duct at the level   of the pancreatic head on image 47 of series 2.  GALLBLADDER: Cholelithiasis.  SPLEEN: Within normal limits.  PANCREAS: Within normal limits.  ADRENALS: Within normal limits.  KIDNEYS/URETERS: Within normal limits.    BLADDER: Within normal limits.  REPRODUCTIVE ORGANS: Hysterectomy.    BOWEL: No bowel obstruction. Appendix not identified and may be   surgically absent. There is infiltration of the medial soft tissues of   the right buttock with small air bubbles seen. There is no well-defined   collection.  PERITONEUM: No ascites.  VESSELS: Atherosclerotic changes.  RETROPERITONEUM/LYMPH NODES: No lymphadenopathy.  ABDOMINAL WALL: Surgical clips in the right inguinal region.. Small   fat-containing umbilical hernia  BONES: Degenerative changes. There is a moderate levoscoliosis in the   lumbar spine. Grade 1 anterolisthesis of L4 on L5    IMPRESSION:  Infectious process in the right gluteal fold medially with infiltration   of subcutaneous fat and small air bubbles. There is no discrete drainable   collection  Incidental note is made of cholelithiasis and choledocholithiasis without   biliary ductal dilatation    --- End of Report ---    < end of copied text >      Imaging  Reviewed:  YES    Consultant(s) Notes Reviewed:   YES      Plan of care was discussed with patient and /or primary care giver; all questions and concerns were addressed

## 2022-10-18 NOTE — PROGRESS NOTE ADULT - ASSESSMENT
Right buttock abscess - s/p I&D   MSSA bacteremia - repeat BC are negative   R/o'ed endocarditis     Plan:   ·	continue Ancef 1g iv q8h until 11/06  ·	dc planning is pending until IV infusion service set up is finalized

## 2022-10-18 NOTE — PROGRESS NOTE ADULT - SUBJECTIVE AND OBJECTIVE BOX
C A R D I O L O G Y  **********************************     DATE OF SERVICE: 10-18-22    Patient denies chest pain or shortness of breath.   Review of symptoms otherwise negative.    acetaminophen     Tablet .. 650 milliGRAM(s) Oral every 6 hours PRN  aluminum hydroxide/magnesium hydroxide/simethicone Suspension 30 milliLiter(s) Oral every 4 hours PRN  ceFAZolin   IVPB 1000 milliGRAM(s) IV Intermittent every 8 hours  chlorhexidine 2% Cloths 1 Application(s) Topical <User Schedule>  chlorhexidine 2% Cloths 1 Application(s) Topical daily  dextrose 5%. 1000 milliLiter(s) IV Continuous <Continuous>  dextrose 5%. 1000 milliLiter(s) IV Continuous <Continuous>  dextrose 50% Injectable 25 Gram(s) IV Push once  dextrose 50% Injectable 12.5 Gram(s) IV Push once  dextrose 50% Injectable 25 Gram(s) IV Push once  dextrose Oral Gel 15 Gram(s) Oral once PRN  enoxaparin Injectable 40 milliGRAM(s) SubCutaneous every 24 hours  gabapentin 100 milliGRAM(s) Oral two times a day  glucagon  Injectable 1 milliGRAM(s) IntraMuscular once  hydrochlorothiazide 25 milliGRAM(s) Oral daily  insulin lispro (ADMELOG) corrective regimen sliding scale   SubCutaneous three times a day before meals  insulin lispro (ADMELOG) corrective regimen sliding scale   SubCutaneous at bedtime  losartan 100 milliGRAM(s) Oral daily  montelukast 10 milliGRAM(s) Oral at bedtime  nystatin Powder 1 Application(s) Topical two times a day  pantoprazole    Tablet      pantoprazole    Tablet 40 milliGRAM(s) Oral before breakfast  propranolol  milliGRAM(s) Oral daily  sodium chloride 0.9% lock flush 10 milliLiter(s) IV Push every 1 hour PRN  sodium chloride 0.9%. 1000 milliLiter(s) IV Continuous <Continuous>                            13.0   6.51  )-----------( 311      ( 18 Oct 2022 07:03 )             40.1       Hemoglobin: 13.0 g/dL (10-18 @ 07:03)  Hemoglobin: 12.4 g/dL (10-16 @ 07:20)  Hemoglobin: 12.5 g/dL (10-15 @ 07:36)      10-18    134<L>  |  101  |  21<H>  ----------------------------<  108<H>  4.2   |  27  |  0.94    Ca    9.7      18 Oct 2022 07:03      Creatinine Trend: 0.94<--, 0.94<--, 0.90<--, 0.74<--, 0.93<--, 0.86<--    COAGS:           T(C): 36.5 (10-18-22 @ 04:40), Max: 36.5 (10-17-22 @ 21:05)  HR: 62 (10-18-22 @ 04:40) (55 - 62)  BP: 155/90 (10-18-22 @ 04:40) (140/88 - 155/90)  RR: 18 (10-18-22 @ 04:40) (17 - 18)  SpO2: 94% (10-18-22 @ 04:40) (94% - 96%)  Wt(kg): --    I&O's Summary      Gen: Appears well in NAD  HEENT:  (-)icterus (-)pallor  CV: N S1 S2 1/6 GAGE (+)2 Pulses B/l  Resp:  Clear to ausculatation B/L, normal effort  GI: (+) BS Soft, NT, ND  Lymph:  (-)Edema, (-)obvious lymphadenopathy  Skin: Warm to touch, Normal turgor  Psych: Appropriate mood and affect        ASSESSMENT/PLAN: 	76y  Female  PMH of HTN, DM is here for worsening of perineal abscess MSSA bacteremia echo with nrmal lV and RV function no vegetations.    - No vegetations on VERONICA  - s/p PICC  - No clinical findings to suggest endocarditis  - complete Abx per ID  - No need for further inpatient cardiac work up.  - I will sign off please call back if needed      Genaro Segura MD, Located within [facility]  [place]PER [phone]

## 2022-10-18 NOTE — PROGRESS NOTE ADULT - SUBJECTIVE AND OBJECTIVE BOX
76y Female is under our care for right buttock abscess (s/p I&D) and MSSA bacteremia. Patient is doing well and has no new complaints to contribute. Has been staying in hospital while navigating through IV infusion service setup barriers. Admits right buttock discomfort has completely resolved. Remains afebrile with normal wbc count.     MEDS:  ceFAZolin   IVPB 1000 milliGRAM(s) IV Intermittent every 8 hours    ALLERGIES: Allergies    codeine (Rash)    Intolerances    REVIEW OF SYSTEMS:  [  ] Not able to illicit  General: no fevers no malaise  Chest: no cough no sob  GI: no nvd  : no urinary sxs   Skin: no rashes  Musculoskeletal: no trauma no LBP  Neuro: no ha's no dizziness     VITALS:  Vital Signs Last 24 Hrs  T(C): 36.5 (18 Oct 2022 04:40), Max: 36.5 (17 Oct 2022 21:05)  T(F): 97.7 (18 Oct 2022 04:40), Max: 97.7 (17 Oct 2022 21:05)  HR: 62 (18 Oct 2022 04:40) (55 - 62)  BP: 155/90 (18 Oct 2022 04:40) (140/88 - 155/90)  BP(mean): --  RR: 18 (18 Oct 2022 04:40) (17 - 18)  SpO2: 94% (18 Oct 2022 04:40) (94% - 96%)    PHYSICAL EXAM:  HEENT: n/a  Neck: supple no LN's   Respiratory: lungs clear no rales  Cardiovascular: S1 S2 reg no murmurs  Gastrointestinal: +BS with soft, nondistended abdomen; nontender  Extremities: no edema, +left arm PICC line  Skin: right buttock region is dressed  Ortho: n/a  Neuro: awake and alert    LABS/DIAGNOSTIC TESTS:                        13.0   6.51  )-----------( 311      ( 18 Oct 2022 07:03 )             40.1     WBC Count: 6.51 K/uL (10-18 @ 07:03)  WBC Count: 7.97 K/uL (10-16 @ 07:20)  WBC Count: 9.02 K/uL (10-15 @ 07:36)    10-18    134<L>  |  101  |  21<H>  ----------------------------<  108<H>  4.2   |  27  |  0.94    Ca    9.7      18 Oct 2022 07:03        CULTURES:   .Blood Blood-Peripheral  10-09 @ 06:24   No Growth Final  --  --      .Blood Blood-Peripheral  10-09 @ 06:02   No Growth Final  --  --      .Blood Blood-Peripheral  10-06 @ 12:00   Growth in aerobic and anaerobic bottles: Staphylococcus aureus  See previous culture  [id number]  --  Blood Culture PCR  Staphylococcus aureus      .Abscess Leg - Right  10-01 @ 13:10   Numerous Staphylococcus aureus  --  Staphylococcus aureus      RADIOLOGY:  no new studies

## 2022-10-18 NOTE — PROGRESS NOTE ADULT - SUBJECTIVE AND OBJECTIVE BOX
Date of Service  : 10-18-22     INTERVAL HPI/OVERNIGHT EVENTS: Seen and examined earlier today . I feel fine.   Vital Signs Last 24 Hrs  T(C): 36.4 (18 Oct 2022 12:42), Max: 36.5 (17 Oct 2022 21:05)  T(F): 97.5 (18 Oct 2022 12:42), Max: 97.7 (17 Oct 2022 21:05)  HR: 55 (18 Oct 2022 12:42) (55 - 62)  BP: 143/79 (18 Oct 2022 12:42) (140/88 - 155/90)  BP(mean): --  RR: 18 (18 Oct 2022 12:42) (17 - 18)  SpO2: 96% (18 Oct 2022 12:42) (94% - 96%)    Parameters below as of 18 Oct 2022 12:42  Patient On (Oxygen Delivery Method): room air      I&O's Summary    MEDICATIONS  (STANDING):  ceFAZolin   IVPB 1000 milliGRAM(s) IV Intermittent every 8 hours  chlorhexidine 2% Cloths 1 Application(s) Topical <User Schedule>  dextrose 5%. 1000 milliLiter(s) (50 mL/Hr) IV Continuous <Continuous>  dextrose 5%. 1000 milliLiter(s) (100 mL/Hr) IV Continuous <Continuous>  dextrose 50% Injectable 25 Gram(s) IV Push once  dextrose 50% Injectable 12.5 Gram(s) IV Push once  dextrose 50% Injectable 25 Gram(s) IV Push once  enoxaparin Injectable 40 milliGRAM(s) SubCutaneous every 24 hours  gabapentin 100 milliGRAM(s) Oral two times a day  glucagon  Injectable 1 milliGRAM(s) IntraMuscular once  hydrochlorothiazide 25 milliGRAM(s) Oral daily  insulin lispro (ADMELOG) corrective regimen sliding scale   SubCutaneous three times a day before meals  insulin lispro (ADMELOG) corrective regimen sliding scale   SubCutaneous at bedtime  losartan 100 milliGRAM(s) Oral daily  montelukast 10 milliGRAM(s) Oral at bedtime  nystatin Powder 1 Application(s) Topical two times a day  pantoprazole    Tablet      pantoprazole    Tablet 40 milliGRAM(s) Oral before breakfast  propranolol  milliGRAM(s) Oral daily  sodium chloride 0.9%. 1000 milliLiter(s) (80 mL/Hr) IV Continuous <Continuous>    MEDICATIONS  (PRN):  acetaminophen     Tablet .. 650 milliGRAM(s) Oral every 6 hours PRN Temp greater or equal to 38C (100.4F), Mild Pain (1 - 3)  aluminum hydroxide/magnesium hydroxide/simethicone Suspension 30 milliLiter(s) Oral every 4 hours PRN Dyspepsia  dextrose Oral Gel 15 Gram(s) Oral once PRN Blood Glucose LESS THAN 70 milliGRAM(s)/deciliter  sodium chloride 0.9% lock flush 10 milliLiter(s) IV Push every 1 hour PRN Pre/post blood products, medications, blood draw, and to maintain line patency    LABS:                        13.0   6.51  )-----------( 311      ( 18 Oct 2022 07:03 )             40.1     10-18    134<L>  |  101  |  21<H>  ----------------------------<  108<H>  4.2   |  27  |  0.94    Ca    9.7      18 Oct 2022 07:03          CAPILLARY BLOOD GLUCOSE      POCT Blood Glucose.: 126 mg/dL (18 Oct 2022 11:35)  POCT Blood Glucose.: 100 mg/dL (18 Oct 2022 07:39)  POCT Blood Glucose.: 136 mg/dL (17 Oct 2022 21:32)  POCT Blood Glucose.: 102 mg/dL (17 Oct 2022 16:54)          REVIEW OF SYSTEMS:  CONSTITUTIONAL: No fever, weight loss, or fatigue  EYES: No eye pain, visual disturbances, or discharge  ENMT:  No difficulty hearing, tinnitus, vertigo; No sinus or throat pain  NECK: No pain or stiffness  RESPIRATORY: No cough, wheezing, chills or hemoptysis; No shortness of breath  CARDIOVASCULAR: No chest pain, palpitations, dizziness, or leg swelling  GASTROINTESTINAL: No abdominal or epigastric pain. No nausea, vomiting, or hematemesis; No diarrhea or constipation. No melena or hematochezia.  GENITOURINARY: No dysuria, frequency, hematuria, or incontinence  NEUROLOGICAL: No headaches, memory loss, loss of strength, numbness, or tremors      Consultant(s) Notes Reviewed:  [x ] YES  [ ] NO    PHYSICAL EXAM:  GENERAL: NAD, well-groomed, well-developed,not in any distress ,  HEAD:  Atraumatic, Normocephalic  NECK: Supple, No JVD, Normal thyroid  NERVOUS SYSTEM:  Alert & Oriented X3, No focal deficit   CHEST/LUNG: Good air entry bilateral with no  rales, rhonchi, wheezing, or rubs  HEART: Regular rate and rhythm; No murmurs, rubs, or gallops  ABDOMEN: Soft, Nontender, Nondistended; Bowel sounds present  EXTREMITIES:  2+ Peripheral Pulses, No clubbing, cyanosis, or edema    Care Discussed with Consultants/Other Providers [ x] YES  [ ] NO

## 2022-10-19 LAB
GLUCOSE BLDC GLUCOMTR-MCNC: 114 MG/DL — HIGH (ref 70–99)
GLUCOSE BLDC GLUCOMTR-MCNC: 119 MG/DL — HIGH (ref 70–99)
GLUCOSE BLDC GLUCOMTR-MCNC: 132 MG/DL — HIGH (ref 70–99)
GLUCOSE BLDC GLUCOMTR-MCNC: 134 MG/DL — HIGH (ref 70–99)
SARS-COV-2 RNA SPEC QL NAA+PROBE: SIGNIFICANT CHANGE UP

## 2022-10-19 RX ADMIN — GABAPENTIN 100 MILLIGRAM(S): 400 CAPSULE ORAL at 17:10

## 2022-10-19 RX ADMIN — NYSTATIN CREAM 1 APPLICATION(S): 100000 CREAM TOPICAL at 05:18

## 2022-10-19 RX ADMIN — CHLORHEXIDINE GLUCONATE 1 APPLICATION(S): 213 SOLUTION TOPICAL at 05:17

## 2022-10-19 RX ADMIN — NYSTATIN CREAM 1 APPLICATION(S): 100000 CREAM TOPICAL at 17:27

## 2022-10-19 RX ADMIN — Medication 100 MILLIGRAM(S): at 13:15

## 2022-10-19 RX ADMIN — Medication 650 MILLIGRAM(S): at 15:41

## 2022-10-19 RX ADMIN — Medication 100 MILLIGRAM(S): at 21:10

## 2022-10-19 RX ADMIN — LOSARTAN POTASSIUM 100 MILLIGRAM(S): 100 TABLET, FILM COATED ORAL at 05:14

## 2022-10-19 RX ADMIN — MONTELUKAST 10 MILLIGRAM(S): 4 TABLET, CHEWABLE ORAL at 21:10

## 2022-10-19 RX ADMIN — PANTOPRAZOLE SODIUM 40 MILLIGRAM(S): 20 TABLET, DELAYED RELEASE ORAL at 05:13

## 2022-10-19 RX ADMIN — GABAPENTIN 100 MILLIGRAM(S): 400 CAPSULE ORAL at 05:15

## 2022-10-19 RX ADMIN — ENOXAPARIN SODIUM 40 MILLIGRAM(S): 100 INJECTION SUBCUTANEOUS at 05:20

## 2022-10-19 RX ADMIN — Medication 650 MILLIGRAM(S): at 16:47

## 2022-10-19 RX ADMIN — Medication 100 MILLIGRAM(S): at 05:12

## 2022-10-19 NOTE — PROGRESS NOTE ADULT - PROBLEM SELECTOR PLAN 1
-  MSSA bacteremia secondary perineal Abscess  -  S/P I&D 10/1: culture positive staph aureus  -  Continue Ancef 1g iv q8  -  repeat CX negative   -  S/P TTE with no obvious vegetations noted  -  VERONICA no endocarditis noted  -  s/p PICC placement for 4 weeks of IV antibiotics, until 11/6  -  ID Dr. Limon

## 2022-10-19 NOTE — CHART NOTE - NSCHARTNOTEFT_GEN_A_CORE
Assessment:   76yFemalePatient is a 76y old  Female who presents with a chief complaint of Perineal abscess (18 Oct 2022 11:44) Pt visited. Pt Reports Good appetite. Po tolerated. No Updated food choices Verbalized. Labs noted. H/O DM. On Diabetic Diet, And RISS.      Factors impacting intake: [ ] none [ ] nausea  [ ] vomiting [ ] diarrhea [ ] constipation  [ ]chewing problems [ ] swallowing issues  [ ] other:     Diet Prescription:  Diet, Consistent Carbohydrate w/Evening Snack (10-07-22 @ 09:13)    Intake:  ~75 % Of meal.    Current Weight:   % Weight  Change    Pertinent Medications: MEDICATIONS  (STANDING):  ceFAZolin   IVPB 1000 milliGRAM(s) IV Intermittent every 8 hours  chlorhexidine 2% Cloths 1 Application(s) Topical <User Schedule>  dextrose 5%. 1000 milliLiter(s) (50 mL/Hr) IV Continuous <Continuous>  dextrose 5%. 1000 milliLiter(s) (100 mL/Hr) IV Continuous <Continuous>  dextrose 50% Injectable 25 Gram(s) IV Push once  dextrose 50% Injectable 12.5 Gram(s) IV Push once  dextrose 50% Injectable 25 Gram(s) IV Push once  enoxaparin Injectable 40 milliGRAM(s) SubCutaneous every 24 hours  gabapentin 100 milliGRAM(s) Oral two times a day  glucagon  Injectable 1 milliGRAM(s) IntraMuscular once  hydrochlorothiazide 25 milliGRAM(s) Oral daily  insulin lispro (ADMELOG) corrective regimen sliding scale   SubCutaneous three times a day before meals  insulin lispro (ADMELOG) corrective regimen sliding scale   SubCutaneous at bedtime  losartan 100 milliGRAM(s) Oral daily  montelukast 10 milliGRAM(s) Oral at bedtime  nystatin Powder 1 Application(s) Topical two times a day  pantoprazole    Tablet      pantoprazole    Tablet 40 milliGRAM(s) Oral before breakfast  propranolol  milliGRAM(s) Oral daily  sodium chloride 0.9%. 1000 milliLiter(s) (80 mL/Hr) IV Continuous <Continuous>    MEDICATIONS  (PRN):  acetaminophen     Tablet .. 650 milliGRAM(s) Oral every 6 hours PRN Temp greater or equal to 38C (100.4F), Mild Pain (1 - 3)  aluminum hydroxide/magnesium hydroxide/simethicone Suspension 30 milliLiter(s) Oral every 4 hours PRN Dyspepsia  dextrose Oral Gel 15 Gram(s) Oral once PRN Blood Glucose LESS THAN 70 milliGRAM(s)/deciliter  sodium chloride 0.9% lock flush 10 milliLiter(s) IV Push every 1 hour PRN Pre/post blood products, medications, blood draw, and to maintain line patency    Pertinent Labs: 10-18 Na134 mmol/L<L> Glu 108 mg/dL<H> K+ 4.2 mmol/L Cr  0.94 mg/dL BUN 21 mg/dL<H>     CAPILLARY BLOOD GLUCOSE      POCT Blood Glucose.: 134 mg/dL (19 Oct 2022 12:01)  POCT Blood Glucose.: 132 mg/dL (19 Oct 2022 07:40)  POCT Blood Glucose.: 110 mg/dL (18 Oct 2022 22:27)  POCT Blood Glucose.: 108 mg/dL (18 Oct 2022 16:48)    Skin: Intact    Estimated Needs:   [ ] no change since previous assessment  [ ] recalculated:     Previous Nutrition Diagnosis:   [ ] Inadequate Energy Intake [ ]Inadequate Oral Intake [ ] Excessive Energy Intake   [ ] Underweight [ ] Increased Nutrient Needs [ ] Overweight/Obesity   [ ] Altered GI Function [ ] Unintended Weight Loss [ ] Food & Nutrition Related Knowledge Deficit [ ] Malnutrition     Nutrition Diagnosis is [ ] ongoing  [ ] resolved [ ] not applicable     New Nutrition Diagnosis: [ ] not applicable       Interventions:   Recommend  [ ] Change Diet To:  [ ] Nutrition Supplement  [ ] Nutrition Support  [x ] Other: Continue with current diet Rx.     Monitoring and Evaluation:   [ ] PO intake [ x ] Tolerance to diet prescription [ x ] weights [ x ] labs[ x ] follow up per protocol  [ ] other: Assessment:   76yFemalePatient is a 76y old  Female who presents with a chief complaint of Perineal abscess (18 Oct 2022 11:44) Pt visited. Pt Reports Good appetite. Po tolerated. No Updated food choices Verbalized. Labs noted. H/O DM. On Diabetic Diet, And RISS. Pt Prefers Soy Milk.       Factors impacting intake: [ ] none [ ] nausea  [ ] vomiting [ ] diarrhea [ ] constipation  [ ]chewing problems [ ] swallowing issues  [ ] other:     Diet Prescription:  Diet, Consistent Carbohydrate w/Evening Snack (10-07-22 @ 09:13)    Intake:  ~75 % Of meal.    Current Weight:   % Weight  Change    Pertinent Medications: MEDICATIONS  (STANDING):  ceFAZolin   IVPB 1000 milliGRAM(s) IV Intermittent every 8 hours  chlorhexidine 2% Cloths 1 Application(s) Topical <User Schedule>  dextrose 5%. 1000 milliLiter(s) (50 mL/Hr) IV Continuous <Continuous>  dextrose 5%. 1000 milliLiter(s) (100 mL/Hr) IV Continuous <Continuous>  dextrose 50% Injectable 25 Gram(s) IV Push once  dextrose 50% Injectable 12.5 Gram(s) IV Push once  dextrose 50% Injectable 25 Gram(s) IV Push once  enoxaparin Injectable 40 milliGRAM(s) SubCutaneous every 24 hours  gabapentin 100 milliGRAM(s) Oral two times a day  glucagon  Injectable 1 milliGRAM(s) IntraMuscular once  hydrochlorothiazide 25 milliGRAM(s) Oral daily  insulin lispro (ADMELOG) corrective regimen sliding scale   SubCutaneous three times a day before meals  insulin lispro (ADMELOG) corrective regimen sliding scale   SubCutaneous at bedtime  losartan 100 milliGRAM(s) Oral daily  montelukast 10 milliGRAM(s) Oral at bedtime  nystatin Powder 1 Application(s) Topical two times a day  pantoprazole    Tablet      pantoprazole    Tablet 40 milliGRAM(s) Oral before breakfast  propranolol  milliGRAM(s) Oral daily  sodium chloride 0.9%. 1000 milliLiter(s) (80 mL/Hr) IV Continuous <Continuous>    MEDICATIONS  (PRN):  acetaminophen     Tablet .. 650 milliGRAM(s) Oral every 6 hours PRN Temp greater or equal to 38C (100.4F), Mild Pain (1 - 3)  aluminum hydroxide/magnesium hydroxide/simethicone Suspension 30 milliLiter(s) Oral every 4 hours PRN Dyspepsia  dextrose Oral Gel 15 Gram(s) Oral once PRN Blood Glucose LESS THAN 70 milliGRAM(s)/deciliter  sodium chloride 0.9% lock flush 10 milliLiter(s) IV Push every 1 hour PRN Pre/post blood products, medications, blood draw, and to maintain line patency    Pertinent Labs: 10-18 Na134 mmol/L<L> Glu 108 mg/dL<H> K+ 4.2 mmol/L Cr  0.94 mg/dL BUN 21 mg/dL<H>     CAPILLARY BLOOD GLUCOSE      POCT Blood Glucose.: 134 mg/dL (19 Oct 2022 12:01)  POCT Blood Glucose.: 132 mg/dL (19 Oct 2022 07:40)  POCT Blood Glucose.: 110 mg/dL (18 Oct 2022 22:27)  POCT Blood Glucose.: 108 mg/dL (18 Oct 2022 16:48)    Skin: Intact    Estimated Needs:   [ ] no change since previous assessment  [ ] recalculated:           Interventions:   Recommend  [ ] Change Diet To:  [ ] Nutrition Supplement  [ ] Nutrition Support  [x ] Other: Continue with current diet Rx.     Monitoring and Evaluation:   [ ] PO intake [ x ] Tolerance to diet prescription [ x ] weights [ x ] labs[ x ] follow up per protocol  [ ] other:

## 2022-10-19 NOTE — PROGRESS NOTE ADULT - PROBLEM SELECTOR PLAN 3
-  controlled   -  Continue with  HCTZ  -  Continue with Losartan  -  Continue with Propranolol  - monitor blood pressures

## 2022-10-19 NOTE — PROGRESS NOTE ADULT - ASSESSMENT
76 year old female from home with PMH of HTN, DM, Patient had an I/D on 10/1 in the ED and was discharged on keflex. Presents with worsening purulent discharge from abscess, later found to + MSSA Bacteremia.SX and ID Braxton followed, no acute sx intervention needed at this time. Started on IV Ancef, repeat CX negative. S/P PICC placement, VERONICA no evidence endocarditis. Patient unable to afford outpatient IV home antibiotics and no financial support at home.   Will remain inpatient for duration of IV antibiotic course until 11/6/22

## 2022-10-19 NOTE — PROGRESS NOTE ADULT - SUBJECTIVE AND OBJECTIVE BOX
Patient is a 76y old  Female who presents with a chief complaint of Perineal abscess (18 Oct 2022 11:44)      INTERVAL HPI/OVERNIGHT EVENTS: no overnight events    I&O's Summary    Vital Signs Last 24 Hrs  T(C): 36.4 (19 Oct 2022 12:33), Max: 36.4 (19 Oct 2022 05:03)  T(F): 97.6 (19 Oct 2022 12:33), Max: 97.6 (19 Oct 2022 12:33)  HR: 60 (19 Oct 2022 12:33) (53 - 60)  BP: 121/68 (19 Oct 2022 12:33) (121/68 - 152/86)  BP(mean): --  RR: 16 (19 Oct 2022 12:33) (16 - 18)  SpO2: 93% (19 Oct 2022 12:33) (93% - 95%)    Parameters below as of 19 Oct 2022 12:33  Patient On (Oxygen Delivery Method): room air      PAST MEDICAL & SURGICAL HISTORY:  History of hypertension      Osteopenia      Gastritis      Hypercholesterolemia      Carpal tunnel syndrome          SOCIAL HISTORY  Alcohol:  Tobacco:  Illicit substance use:      FAMILY HISTORY:      LABS:                        13.0   6.51  )-----------( 311      ( 18 Oct 2022 07:03 )             40.1     10-18    134<L>  |  101  |  21<H>  ----------------------------<  108<H>  4.2   |  27  |  0.94    Ca    9.7      18 Oct 2022 07:03          CAPILLARY BLOOD GLUCOSE      POCT Blood Glucose.: 134 mg/dL (19 Oct 2022 12:01)  POCT Blood Glucose.: 132 mg/dL (19 Oct 2022 07:40)  POCT Blood Glucose.: 110 mg/dL (18 Oct 2022 22:27)  POCT Blood Glucose.: 108 mg/dL (18 Oct 2022 16:48)      MEDICATIONS  (STANDING):  ceFAZolin   IVPB 1000 milliGRAM(s) IV Intermittent every 8 hours  chlorhexidine 2% Cloths 1 Application(s) Topical <User Schedule>  dextrose 5%. 1000 milliLiter(s) (50 mL/Hr) IV Continuous <Continuous>  dextrose 5%. 1000 milliLiter(s) (100 mL/Hr) IV Continuous <Continuous>  dextrose 50% Injectable 25 Gram(s) IV Push once  dextrose 50% Injectable 12.5 Gram(s) IV Push once  dextrose 50% Injectable 25 Gram(s) IV Push once  enoxaparin Injectable 40 milliGRAM(s) SubCutaneous every 24 hours  gabapentin 100 milliGRAM(s) Oral two times a day  glucagon  Injectable 1 milliGRAM(s) IntraMuscular once  hydrochlorothiazide 25 milliGRAM(s) Oral daily  insulin lispro (ADMELOG) corrective regimen sliding scale   SubCutaneous three times a day before meals  insulin lispro (ADMELOG) corrective regimen sliding scale   SubCutaneous at bedtime  losartan 100 milliGRAM(s) Oral daily  montelukast 10 milliGRAM(s) Oral at bedtime  nystatin Powder 1 Application(s) Topical two times a day  pantoprazole    Tablet      pantoprazole    Tablet 40 milliGRAM(s) Oral before breakfast  propranolol  milliGRAM(s) Oral daily  sodium chloride 0.9%. 1000 milliLiter(s) (80 mL/Hr) IV Continuous <Continuous>    MEDICATIONS  (PRN):  acetaminophen     Tablet .. 650 milliGRAM(s) Oral every 6 hours PRN Temp greater or equal to 38C (100.4F), Mild Pain (1 - 3)  aluminum hydroxide/magnesium hydroxide/simethicone Suspension 30 milliLiter(s) Oral every 4 hours PRN Dyspepsia  dextrose Oral Gel 15 Gram(s) Oral once PRN Blood Glucose LESS THAN 70 milliGRAM(s)/deciliter  sodium chloride 0.9% lock flush 10 milliLiter(s) IV Push every 1 hour PRN Pre/post blood products, medications, blood draw, and to maintain line patency      REVIEW OF SYSTEMS:  CONSTITUTIONAL: No fever  EYES: No eye pain, visual disturbances  ENMT:  No difficulty hearing; No sinus or throat pain  NECK: No pain  RESPIRATORY: No cough, wheezing; No shortness of breath  CARDIOVASCULAR: No chest pain, palpitations, dizziness, or leg swelling  GASTROINTESTINAL: No abdominal pain. No nausea, vomiting; No diarrhea or constipation.   GENITOURINARY: No dysuria  NEUROLOGICAL: No headaches  SKIN: No rashes, or lesions   MUSCULOSKELETAL: No joint pain or swelling    RADIOLOGY & ADDITIONAL TESTS:  < from: CT Abdomen and Pelvis w/ IV Cont (10.06.22 @ 17:44) >    ACC: [id number] EXAM:  CT ABDOMEN AND PELVIS IC                          PROCEDURE DATE:  10/06/2022          INTERPRETATION:  CLINICAL INFORMATION: Radial abscess    COMPARISON: None.    CONTRAST/COMPLICATIONS:  IV Contrast: Omnipaque 350  90 cc administered   10 cc discarded  Oral Contrast: NONE  Complications: None reported at time of study completion    PROCEDURE:  CT of the Abdomen and Pelvis was performed.  Sagittal and coronal reformats were performed.    FINDINGS:  LOWER CHEST: Within normal limits.    LIVER: Within normal limits. The hepatic dome is not completely included   on the images  BILE DUCTS: Punctate calcification in the common bile duct at the level   of the pancreatic head on image 47 of series 2.  GALLBLADDER: Cholelithiasis.  SPLEEN: Within normal limits.  PANCREAS: Within normal limits.  ADRENALS: Within normal limits.  KIDNEYS/URETERS: Within normal limits.    BLADDER: Within normal limits.  REPRODUCTIVE ORGANS: Hysterectomy.    BOWEL: No bowel obstruction. Appendix not identified and may be   surgically absent. There is infiltration of the medial soft tissues of   the right buttock with small air bubbles seen. There is no well-defined   collection.  PERITONEUM: No ascites.  VESSELS: Atherosclerotic changes.  RETROPERITONEUM/LYMPH NODES: No lymphadenopathy.  ABDOMINAL WALL: Surgical clips in the right inguinal region.. Small   fat-containing umbilical hernia  BONES: Degenerative changes. There is a moderate levoscoliosis in the   lumbar spine. Grade 1 anterolisthesis of L4 on L5    IMPRESSION:  Infectious process in the right gluteal fold medially with infiltration   of subcutaneous fat and small air bubbles. There is no discrete drainable   collection  Incidental note is made of cholelithiasis and choledocholithiasis without   biliary ductal dilatation    --- End of Report ---      ZARA CHA MD; Attending Radiologist  This document has been electronically signed. Oct  6 2022  5:55PM    < end of copied text >    Imaging Personally Reviewed:  [x ] YES  [ ] NO    Consultant(s) Notes Reviewed:  [x ] YES  [ ] NO    PHYSICAL EXAM:  GENERAL: NAD  HEAD:  Atraumatic, Normocephalic  EYES:  conjunctiva and sclera clear  ENMT: Moist mucous membranes,  NECK: Supple,  NERVOUS SYSTEM:  Alert & Oriented X3, Good concentration;  CHEST/LUNG: CTA bilaterally; No rales, rhonchi, wheezing  HEART: Regular rate and rhythm  ABDOMEN: Soft, Nontender, Nondistended; Bowel sounds present  EXTREMITIES:  2+ Peripheral Pulses  SKIN: perianal abscess    Care Collaborated Discussed with Consultants/Other Providers [ x] YES  [ ] NO

## 2022-10-19 NOTE — PROGRESS NOTE ADULT - SUBJECTIVE AND OBJECTIVE BOX
Date of Service  : 10-19-22     INTERVAL HPI/OVERNIGHT EVENTS: I feel fine.   Vital Signs Last 24 Hrs  T(C): 36.4 (19 Oct 2022 12:33), Max: 36.4 (19 Oct 2022 05:03)  T(F): 97.6 (19 Oct 2022 12:33), Max: 97.6 (19 Oct 2022 12:33)  HR: 60 (19 Oct 2022 12:33) (53 - 60)  BP: 121/68 (19 Oct 2022 12:33) (121/68 - 152/86)  BP(mean): --  RR: 16 (19 Oct 2022 12:33) (16 - 18)  SpO2: 93% (19 Oct 2022 12:33) (93% - 95%)    Parameters below as of 19 Oct 2022 12:33  Patient On (Oxygen Delivery Method): room air      I&O's Summary    MEDICATIONS  (STANDING):  ceFAZolin   IVPB 1000 milliGRAM(s) IV Intermittent every 8 hours  chlorhexidine 2% Cloths 1 Application(s) Topical <User Schedule>  dextrose 5%. 1000 milliLiter(s) (50 mL/Hr) IV Continuous <Continuous>  dextrose 5%. 1000 milliLiter(s) (100 mL/Hr) IV Continuous <Continuous>  dextrose 50% Injectable 25 Gram(s) IV Push once  dextrose 50% Injectable 12.5 Gram(s) IV Push once  dextrose 50% Injectable 25 Gram(s) IV Push once  enoxaparin Injectable 40 milliGRAM(s) SubCutaneous every 24 hours  gabapentin 100 milliGRAM(s) Oral two times a day  glucagon  Injectable 1 milliGRAM(s) IntraMuscular once  hydrochlorothiazide 25 milliGRAM(s) Oral daily  insulin lispro (ADMELOG) corrective regimen sliding scale   SubCutaneous three times a day before meals  insulin lispro (ADMELOG) corrective regimen sliding scale   SubCutaneous at bedtime  losartan 100 milliGRAM(s) Oral daily  montelukast 10 milliGRAM(s) Oral at bedtime  nystatin Powder 1 Application(s) Topical two times a day  pantoprazole    Tablet      pantoprazole    Tablet 40 milliGRAM(s) Oral before breakfast  propranolol  milliGRAM(s) Oral daily  sodium chloride 0.9%. 1000 milliLiter(s) (80 mL/Hr) IV Continuous <Continuous>    MEDICATIONS  (PRN):  acetaminophen     Tablet .. 650 milliGRAM(s) Oral every 6 hours PRN Temp greater or equal to 38C (100.4F), Mild Pain (1 - 3)  aluminum hydroxide/magnesium hydroxide/simethicone Suspension 30 milliLiter(s) Oral every 4 hours PRN Dyspepsia  dextrose Oral Gel 15 Gram(s) Oral once PRN Blood Glucose LESS THAN 70 milliGRAM(s)/deciliter  sodium chloride 0.9% lock flush 10 milliLiter(s) IV Push every 1 hour PRN Pre/post blood products, medications, blood draw, and to maintain line patency    LABS:                        13.0   6.51  )-----------( 311      ( 18 Oct 2022 07:03 )             40.1     10-18    134<L>  |  101  |  21<H>  ----------------------------<  108<H>  4.2   |  27  |  0.94    Ca    9.7      18 Oct 2022 07:03          CAPILLARY BLOOD GLUCOSE      POCT Blood Glucose.: 134 mg/dL (19 Oct 2022 12:01)  POCT Blood Glucose.: 132 mg/dL (19 Oct 2022 07:40)  POCT Blood Glucose.: 110 mg/dL (18 Oct 2022 22:27)  POCT Blood Glucose.: 108 mg/dL (18 Oct 2022 16:48)          REVIEW OF SYSTEMS:  CONSTITUTIONAL: No fever, weight loss, or fatigue  EYES: No eye pain, visual disturbances, or discharge  ENMT:  No difficulty hearing, tinnitus, vertigo; No sinus or throat pain  NECK: No pain or stiffness  RESPIRATORY: No cough, wheezing, chills or hemoptysis; No shortness of breath  CARDIOVASCULAR: No chest pain, palpitations, dizziness, or leg swelling  GASTROINTESTINAL: No abdominal or epigastric pain. No nausea, vomiting, or hematemesis; No diarrhea or constipation. No melena or hematochezia.  GENITOURINARY: No dysuria, frequency, hematuria, or incontinence  NEUROLOGICAL: No headaches, memory loss, loss of strength, numbness, or tremors      Consultant(s) Notes Reviewed:  [x ] YES  [ ] NO    PHYSICAL EXAM:  GENERAL: NAD, well-groomed, well-developed,not in any distress ,  HEAD:  Atraumatic, Normocephalic  EYES: EOMI, PERRLA, conjunctiva and sclera clear  ENMT: No tonsillar erythema, exudates, or enlargement; Moist mucous membranes, Good dentition, No lesions  NECK: Supple, No JVD, Normal thyroid  NERVOUS SYSTEM:  Alert & Oriented X3, No focal deficit   CHEST/LUNG: Good air entry bilateral with no  rales, rhonchi, wheezing, or rubs  HEART: Regular rate and rhythm; No murmurs, rubs, or gallops  ABDOMEN: Soft, Nontender, Nondistended; Bowel sounds present  EXTREMITIES:  2+ Peripheral Pulses, No clubbing, cyanosis, or edema  SKIN: No rashes or lesions    Care Discussed with Consultants/Other Providers [ x] YES  [ ] NO

## 2022-10-19 NOTE — PROGRESS NOTE ADULT - PROBLEM SELECTOR PLAN 6
-  Plan for discharge on  11/6/22  -  Patient needs IV antibiotics until 11/6/22 and cannot afford services at home.

## 2022-10-19 NOTE — PROGRESS NOTE ADULT - PROBLEM SELECTOR PLAN 2
No -  s/p I/D on 10/1 in ed  -  CT A/P showed right gluteal fold with infiltration w/o discrete drainable collection    -  Surgery followed No plans for acute surgical intervention   -  Continue daily packing   -  Continue antibiotics per ID: Ancef 1gm IV q8h until 11/6  -  ID Dr. Limon following

## 2022-10-19 NOTE — PROGRESS NOTE ADULT - ASSESSMENT
76 year old female from home with PMH of HTN, DM is here for worsening of perineal abscess. Patient presented with perineal abscess and had an I/D on 10/1 in the ED and was discharged on keflex. Presents with worsening purulent discharge from abscess, later found to + MSSA Bacteremia.  SX and ID Braxton followed, no acute sx intervention needed at this time. Started on IV Ancef, repeat CX negative. Plan for VERONICA in AM and PICC placement for 4 weeks of ABX. NCM to follow for home infusion services      Problem/Plan - 1:  ·  Problem: MSSA bacteremia.   ·  Plan: + MSSA bacteremia    source likely perineal Abscess   S/P I&D 10/1: cx +staph aureus  Continue Ancef 1g iv q8 till 11/06  repeat CX negative   S/P TTE FOUND No obvious vegetations noted  VERONICA r/o endocarditis  S/P  PICC line and 4 weeks of IV antibiotics .  ID helping.   < from: VERONICA w/Doppler (10.12.22 @ 11:15) >  ------------------------------------------------------------------------  CONCLUSIONS:  1. Normal mitral valve. Trace mitral regurgitation.  2. Normal trileaflet aortic valve. No aortic stenosis.  Trivial aortic regurgitation.  3. Normal Left Ventricular SystolicFunction,  (EF = 55 to  60%)  4. Normal right ventricular size and function. A catheter  is visualized in the right heart.  5. Normal tricuspid valve. Trace tricuspid regurgitation.  6. Normal pulmonic valve. Trace pulmonic insufficiency is  noted.  7. No pericardial effusion.  8. No obvious evidence of endocarditis    < end of copied text >     Problem/Plan - 2:  ·  Problem: Perineal abscess.   ·  Plan: s/p I/D on 10/1  CT A/P showed right gluteal fold with infiltration w/o discrete drainable collection    Surgery followed No plans for acute surgical intervention   Continue daily packing   Continue abx per ID: Ancef 1gm IV q8h  ID Dr. Limon following.     Problem/Plan - 3:  ·  Problem: HTN (hypertension).   ·  Plan: border line   likely 2/2 to meds held due to parameters   will change parameters   continue HCTz, lisinopril + propanolol   Monitor BP.     Problem/Plan - 4:  ·  Problem: Diabetes.   ·  Plan: A1C 6.4  Monitor glucose ac/HS and cover with Admelog S/s.     Problem/Plan - 5:  ·  Problem: SERGEY (acute kidney injury).   ·  Plan: now resolved   SCR now WNL   avoid nephrotoxins   monitor BMP.     Problem/Plan - 6:  ·  Problem: DVT prophylaxis.   ·  Plan: dvt: Lovenox  gi: protonix.     Problem/Plan - 7:  ·  Problem: Discharge planning issues.   ·  Plan: Pt from home      Dispo : DC planning as Medically stable pending home infusion arrangements.

## 2022-10-20 LAB
GLUCOSE BLDC GLUCOMTR-MCNC: 105 MG/DL — HIGH (ref 70–99)
GLUCOSE BLDC GLUCOMTR-MCNC: 111 MG/DL — HIGH (ref 70–99)
GLUCOSE BLDC GLUCOMTR-MCNC: 121 MG/DL — HIGH (ref 70–99)
GLUCOSE BLDC GLUCOMTR-MCNC: 132 MG/DL — HIGH (ref 70–99)

## 2022-10-20 RX ADMIN — GABAPENTIN 100 MILLIGRAM(S): 400 CAPSULE ORAL at 05:09

## 2022-10-20 RX ADMIN — Medication 100 MILLIGRAM(S): at 05:07

## 2022-10-20 RX ADMIN — Medication 100 MILLIGRAM(S): at 14:21

## 2022-10-20 RX ADMIN — Medication 1 CAPSULE(S): at 15:15

## 2022-10-20 RX ADMIN — Medication 1 CAPSULE(S): at 14:21

## 2022-10-20 RX ADMIN — CHLORHEXIDINE GLUCONATE 1 APPLICATION(S): 213 SOLUTION TOPICAL at 05:14

## 2022-10-20 RX ADMIN — LOSARTAN POTASSIUM 100 MILLIGRAM(S): 100 TABLET, FILM COATED ORAL at 05:10

## 2022-10-20 RX ADMIN — Medication 25 MILLIGRAM(S): at 05:10

## 2022-10-20 RX ADMIN — Medication 650 MILLIGRAM(S): at 00:49

## 2022-10-20 RX ADMIN — GABAPENTIN 100 MILLIGRAM(S): 400 CAPSULE ORAL at 18:09

## 2022-10-20 RX ADMIN — NYSTATIN CREAM 1 APPLICATION(S): 100000 CREAM TOPICAL at 05:12

## 2022-10-20 RX ADMIN — NYSTATIN CREAM 1 APPLICATION(S): 100000 CREAM TOPICAL at 18:10

## 2022-10-20 RX ADMIN — PANTOPRAZOLE SODIUM 40 MILLIGRAM(S): 20 TABLET, DELAYED RELEASE ORAL at 06:40

## 2022-10-20 RX ADMIN — ENOXAPARIN SODIUM 40 MILLIGRAM(S): 100 INJECTION SUBCUTANEOUS at 05:14

## 2022-10-20 RX ADMIN — Medication 160 MILLIGRAM(S): at 05:09

## 2022-10-20 RX ADMIN — MONTELUKAST 10 MILLIGRAM(S): 4 TABLET, CHEWABLE ORAL at 21:30

## 2022-10-20 RX ADMIN — Medication 650 MILLIGRAM(S): at 02:00

## 2022-10-20 RX ADMIN — Medication 100 MILLIGRAM(S): at 21:30

## 2022-10-20 NOTE — PROGRESS NOTE ADULT - PROBLEM SELECTOR PLAN 2
-  s/p I/D on 10/1 in ed  -  CT A/P showed right gluteal fold with infiltration w/o discrete drainable collection    -  Surgery followed No plans for acute surgical intervention   -  Continue daily packing   -  Continue antibiotics per ID: Ancef 1gm IV q8h until 11/6  -  ID Dr. Limon following

## 2022-10-20 NOTE — PROGRESS NOTE ADULT - ASSESSMENT
76 year old female from home with PMH of HTN, DM, Patient had an I/D on 10/1 in the ED and was discharged on keflex. Presents with worsening purulent discharge from abscess, later found to + MSSA Bacteremia.SX and ID Braxton followed, no acute sx intervention needed at this time. Started on IV Ancef, repeat CX negative. S/P PICC placement, VERONICA no evidence endocarditis. Patient unable to afford outpatient IV home antibiotics and no financial support at home.   Will remain inpatient for duration of IV antibiotic course until 11/6/22    Pt seen at bedside, no complaints. No fever or leucocytosis

## 2022-10-20 NOTE — PROGRESS NOTE ADULT - ASSESSMENT
76 year old female from home with PMH of HTN, DM is here for worsening of perineal abscess. Patient presented with perineal abscess and had an I/D on 10/1 in the ED and was discharged on keflex. Presents with worsening purulent discharge from abscess, later found to + MSSA Bacteremia.  SX and ID Braxton followed, no acute sx intervention needed at this time. Started on IV Ancef, repeat CX negative. Plan for VERONICA in AM and PICC placement for 4 weeks of ABX. NCM to follow for home infusion services      Problem/Plan - 1:  ·  Problem: MSSA bacteremia.   ·  Plan: + MSSA bacteremia    source likely perineal Abscess   S/P I&D 10/1: cx +staph aureus  Continue Ancef 1g iv q8 till 11/06  repeat CX negative   S/P TTE FOUND No obvious vegetations noted  VERONICA r/o endocarditis  S/P  PICC line and 4 weeks of IV antibiotics .  ID helping.   < from: VERONICA w/Doppler (10.12.22 @ 11:15) >  ------------------------------------------------------------------------  CONCLUSIONS:  1. Normal mitral valve. Trace mitral regurgitation.  2. Normal trileaflet aortic valve. No aortic stenosis.  Trivial aortic regurgitation.  3. Normal Left Ventricular SystolicFunction,  (EF = 55 to  60%)  4. Normal right ventricular size and function. A catheter  is visualized in the right heart.  5. Normal tricuspid valve. Trace tricuspid regurgitation.  6. Normal pulmonic valve. Trace pulmonic insufficiency is  noted.  7. No pericardial effusion.  8. No obvious evidence of endocarditis    < end of copied text >     Problem/Plan - 2:  ·  Problem: Perineal abscess.   ·  Plan: s/p I/D on 10/1  CT A/P showed right gluteal fold with infiltration w/o discrete drainable collection    Surgery followed No plans for acute surgical intervention   Continue daily packing   Continue abx per ID: Ancef 1gm IV q8h  ID Dr. Limon following.     Problem/Plan - 3:  ·  Problem: HTN (hypertension).   ·  Plan: border line   likely 2/2 to meds held due to parameters   will change parameters   continue HCTz, lisinopril + propanolol   Monitor BP.     Problem/Plan - 4:  ·  Problem: Diabetes.   ·  Plan: A1C 6.4  Monitor glucose ac/HS and cover with Admelog S/s.     Problem/Plan - 5:  ·  Problem: SERGEY (acute kidney injury).   ·  Plan: now resolved   SCR now WNL   avoid nephrotoxins   monitor BMP.     Problem/Plan - 6:  ·  Problem: DVT prophylaxis.   ·  Plan: dvt: Lovenox  gi: protonix.     Problem/Plan - 7:  ·  Problem: Discharge planning issues.   ·  Plan: Pt from home      Dispo : DC planning on hold till 11/06 as finishing abxs.

## 2022-10-20 NOTE — PROGRESS NOTE ADULT - SUBJECTIVE AND OBJECTIVE BOX
Date of Service  : 10-20-22     INTERVAL HPI/OVERNIGHT EVENTS: No new concerns.   Vital Signs Last 24 Hrs  T(C): 36.4 (20 Oct 2022 12:54), Max: 36.4 (19 Oct 2022 21:19)  T(F): 97.6 (20 Oct 2022 12:54), Max: 97.6 (19 Oct 2022 21:19)  HR: 56 (20 Oct 2022 12:54) (56 - 70)  BP: 142/86 (20 Oct 2022 12:54) (119/75 - 165/87)  BP(mean): --  RR: 17 (20 Oct 2022 12:54) (16 - 17)  SpO2: 96% (20 Oct 2022 12:54) (92% - 96%)    Parameters below as of 20 Oct 2022 12:54  Patient On (Oxygen Delivery Method): room air      I&O's Summary    MEDICATIONS  (STANDING):  ceFAZolin   IVPB 1000 milliGRAM(s) IV Intermittent every 8 hours  chlorhexidine 2% Cloths 1 Application(s) Topical <User Schedule>  dextrose 5%. 1000 milliLiter(s) (50 mL/Hr) IV Continuous <Continuous>  dextrose 5%. 1000 milliLiter(s) (100 mL/Hr) IV Continuous <Continuous>  dextrose 50% Injectable 25 Gram(s) IV Push once  dextrose 50% Injectable 12.5 Gram(s) IV Push once  dextrose 50% Injectable 25 Gram(s) IV Push once  enoxaparin Injectable 40 milliGRAM(s) SubCutaneous every 24 hours  gabapentin 100 milliGRAM(s) Oral two times a day  glucagon  Injectable 1 milliGRAM(s) IntraMuscular once  hydrochlorothiazide 25 milliGRAM(s) Oral daily  insulin lispro (ADMELOG) corrective regimen sliding scale   SubCutaneous three times a day before meals  insulin lispro (ADMELOG) corrective regimen sliding scale   SubCutaneous at bedtime  losartan 100 milliGRAM(s) Oral daily  montelukast 10 milliGRAM(s) Oral at bedtime  nystatin Powder 1 Application(s) Topical two times a day  pantoprazole    Tablet 40 milliGRAM(s) Oral before breakfast  pantoprazole    Tablet      propranolol  milliGRAM(s) Oral daily  sodium chloride 0.9%. 1000 milliLiter(s) (80 mL/Hr) IV Continuous <Continuous>    MEDICATIONS  (PRN):  acetaminophen     Tablet .. 650 milliGRAM(s) Oral every 6 hours PRN Temp greater or equal to 38C (100.4F), Mild Pain (1 - 3)  acetaminophen 300 mG/butalbital 50 mG/ caffeine 40 mG 1 Capsule(s) Oral every 6 hours PRN migraine headache  aluminum hydroxide/magnesium hydroxide/simethicone Suspension 30 milliLiter(s) Oral every 4 hours PRN Dyspepsia  dextrose Oral Gel 15 Gram(s) Oral once PRN Blood Glucose LESS THAN 70 milliGRAM(s)/deciliter  sodium chloride 0.9% lock flush 10 milliLiter(s) IV Push every 1 hour PRN Pre/post blood products, medications, blood draw, and to maintain line patency    LABS:              CAPILLARY BLOOD GLUCOSE      POCT Blood Glucose.: 111 mg/dL (20 Oct 2022 11:35)  POCT Blood Glucose.: 121 mg/dL (20 Oct 2022 07:52)  POCT Blood Glucose.: 119 mg/dL (19 Oct 2022 21:35)          REVIEW OF SYSTEMS:  CONSTITUTIONAL: No fever, weight loss, or fatigue  EYES: No eye pain, visual disturbances, or discharge  ENMT:  No difficulty hearing, tinnitus, vertigo; No sinus or throat pain  NECK: No pain or stiffness  RESPIRATORY: No cough, wheezing, chills or hemoptysis; No shortness of breath  CARDIOVASCULAR: No chest pain, palpitations, dizziness, or leg swelling  GASTROINTESTINAL: No abdominal or epigastric pain. No nausea, vomiting, or hematemesis; No diarrhea or constipation. No melena or hematochezia.  GENITOURINARY: No dysuria, frequency, hematuria, or incontinence  NEUROLOGICAL: No headaches, memory loss, loss of strength, numbness, or tremors      Consultant(s) Notes Reviewed:  [x ] YES  [ ] NO    PHYSICAL EXAM:  GENERAL: NAD, well-groomed, well-developed,not in any distress ,  HEAD:  Atraumatic, Normocephalic  EYES: EOMI, PERRLA, conjunctiva and sclera clear  ENMT: No tonsillar erythema, exudates, or enlargement; Moist mucous membranes, Good dentition, No lesions  NECK: Supple, No JVD, Normal thyroid  NERVOUS SYSTEM:  Alert & Oriented X3, No focal deficit   CHEST/LUNG: Good air entry bilateral with no  rales, rhonchi, wheezing, or rubs  HEART: Regular rate and rhythm; No murmurs, rubs, or gallops  ABDOMEN: Soft, Nontender, Nondistended; Bowel sounds present  EXTREMITIES:  2+ Peripheral Pulses, No clubbing, cyanosis, or edema  SKIN: No rashes or lesions    Care Discussed with Consultants/Other Providers [ x] YES  [ ] NO

## 2022-10-20 NOTE — PROGRESS NOTE ADULT - ASSESSMENT
Right buttock abscess - s/p I&D   MSSA bacteremia - repeat BC are negative   R/o'ed endocarditis     Plan:   ·	continue Ancef 1g iv q8h until 11/06  ·	it appears patient will be staying to complete treatment since not able to pay for outpatient iv infusion services

## 2022-10-20 NOTE — PROGRESS NOTE ADULT - SUBJECTIVE AND OBJECTIVE BOX
NP Note discussed with  primary attending    Patient is a 76y old  Female who presents with a chief complaint of Perineal abscess (19 Oct 2022 16:15)      INTERVAL HPI/OVERNIGHT EVENTS: no new complaints    MEDICATIONS  (STANDING):  ceFAZolin   IVPB 1000 milliGRAM(s) IV Intermittent every 8 hours  chlorhexidine 2% Cloths 1 Application(s) Topical <User Schedule>  dextrose 5%. 1000 milliLiter(s) (50 mL/Hr) IV Continuous <Continuous>  dextrose 5%. 1000 milliLiter(s) (100 mL/Hr) IV Continuous <Continuous>  dextrose 50% Injectable 25 Gram(s) IV Push once  dextrose 50% Injectable 12.5 Gram(s) IV Push once  dextrose 50% Injectable 25 Gram(s) IV Push once  enoxaparin Injectable 40 milliGRAM(s) SubCutaneous every 24 hours  gabapentin 100 milliGRAM(s) Oral two times a day  glucagon  Injectable 1 milliGRAM(s) IntraMuscular once  hydrochlorothiazide 25 milliGRAM(s) Oral daily  insulin lispro (ADMELOG) corrective regimen sliding scale   SubCutaneous three times a day before meals  insulin lispro (ADMELOG) corrective regimen sliding scale   SubCutaneous at bedtime  losartan 100 milliGRAM(s) Oral daily  montelukast 10 milliGRAM(s) Oral at bedtime  nystatin Powder 1 Application(s) Topical two times a day  pantoprazole    Tablet      pantoprazole    Tablet 40 milliGRAM(s) Oral before breakfast  propranolol  milliGRAM(s) Oral daily  sodium chloride 0.9%. 1000 milliLiter(s) (80 mL/Hr) IV Continuous <Continuous>    MEDICATIONS  (PRN):  acetaminophen     Tablet .. 650 milliGRAM(s) Oral every 6 hours PRN Temp greater or equal to 38C (100.4F), Mild Pain (1 - 3)  aluminum hydroxide/magnesium hydroxide/simethicone Suspension 30 milliLiter(s) Oral every 4 hours PRN Dyspepsia  dextrose Oral Gel 15 Gram(s) Oral once PRN Blood Glucose LESS THAN 70 milliGRAM(s)/deciliter  sodium chloride 0.9% lock flush 10 milliLiter(s) IV Push every 1 hour PRN Pre/post blood products, medications, blood draw, and to maintain line patency      __________________________________________________  REVIEW OF SYSTEMS:    CONSTITUTIONAL: No fever,   RESPIRATORY: No cough; No shortness of breath  CARDIOVASCULAR: No chest pain, no palpitations  GASTROINTESTINAL: No pain. No nausea or vomiting; No diarrhea   NEUROLOGICAL: No headache or numbness, no tremors  MUSCULOSKELETAL: No joint pain, no muscle pain  GENITOURINARY: no dysuria, no frequency, no hesitancy      Vital Signs Last 24 Hrs  T(C): 36.2 (20 Oct 2022 05:23), Max: 36.4 (19 Oct 2022 21:19)  T(F): 97.1 (20 Oct 2022 05:23), Max: 97.6 (19 Oct 2022 21:19)  HR: 68 (20 Oct 2022 05:23) (68 - 70)  BP: 165/87 (20 Oct 2022 05:23) (119/75 - 165/87)  BP(mean): --  RR: 16 (20 Oct 2022 05:23) (16 - 16)  SpO2: 94% (20 Oct 2022 05:23) (92% - 94%)    Parameters below as of 20 Oct 2022 05:23  Patient On (Oxygen Delivery Method): room air        ________________________________________________  PHYSICAL EXAM:  GENERAL: NAD  CHEST/LUNG: Clear to ausculitation bilaterally with good air entry   HEART: S1 S2  regular; no murmurs, gallops or rubs  ABDOMEN: Soft, Nontender, Nondistended; Bowel sounds present  EXTREMITIES: no cyanosis; no edema; no calf tenderness  SKIN: warm and dry; no rash  NERVOUS SYSTEM:  Awake and alert; Oriented  to place, person and time ; no new deficits    _________________________________________________  LABS:              CAPILLARY BLOOD GLUCOSE      POCT Blood Glucose.: 111 mg/dL (20 Oct 2022 11:35)  POCT Blood Glucose.: 121 mg/dL (20 Oct 2022 07:52)  POCT Blood Glucose.: 119 mg/dL (19 Oct 2022 21:35)  POCT Blood Glucose.: 114 mg/dL (19 Oct 2022 16:30)        RADIOLOGY & ADDITIONAL TESTS:    Imaging Personally Reviewed:  YES/NO    Consultant(s) Notes Reviewed:   YES/ No    Care Discussed with Consultants :     Plan of care was discussed with patient and /or primary care giver; all questions and concerns were addressed and care was aligned with patient's wishes.

## 2022-10-21 LAB
GLUCOSE BLDC GLUCOMTR-MCNC: 103 MG/DL — HIGH (ref 70–99)
GLUCOSE BLDC GLUCOMTR-MCNC: 107 MG/DL — HIGH (ref 70–99)
GLUCOSE BLDC GLUCOMTR-MCNC: 114 MG/DL — HIGH (ref 70–99)
GLUCOSE BLDC GLUCOMTR-MCNC: 116 MG/DL — HIGH (ref 70–99)

## 2022-10-21 RX ADMIN — MONTELUKAST 10 MILLIGRAM(S): 4 TABLET, CHEWABLE ORAL at 21:07

## 2022-10-21 RX ADMIN — PANTOPRAZOLE SODIUM 40 MILLIGRAM(S): 20 TABLET, DELAYED RELEASE ORAL at 05:51

## 2022-10-21 RX ADMIN — Medication 25 MILLIGRAM(S): at 09:16

## 2022-10-21 RX ADMIN — Medication 160 MILLIGRAM(S): at 09:18

## 2022-10-21 RX ADMIN — Medication 100 MILLIGRAM(S): at 21:07

## 2022-10-21 RX ADMIN — GABAPENTIN 100 MILLIGRAM(S): 400 CAPSULE ORAL at 19:10

## 2022-10-21 RX ADMIN — GABAPENTIN 100 MILLIGRAM(S): 400 CAPSULE ORAL at 05:48

## 2022-10-21 RX ADMIN — CHLORHEXIDINE GLUCONATE 1 APPLICATION(S): 213 SOLUTION TOPICAL at 05:47

## 2022-10-21 RX ADMIN — Medication 100 MILLIGRAM(S): at 15:55

## 2022-10-21 RX ADMIN — ENOXAPARIN SODIUM 40 MILLIGRAM(S): 100 INJECTION SUBCUTANEOUS at 05:48

## 2022-10-21 RX ADMIN — Medication 100 MILLIGRAM(S): at 05:48

## 2022-10-21 RX ADMIN — NYSTATIN CREAM 1 APPLICATION(S): 100000 CREAM TOPICAL at 05:50

## 2022-10-21 RX ADMIN — LOSARTAN POTASSIUM 100 MILLIGRAM(S): 100 TABLET, FILM COATED ORAL at 05:49

## 2022-10-21 RX ADMIN — NYSTATIN CREAM 1 APPLICATION(S): 100000 CREAM TOPICAL at 19:10

## 2022-10-21 NOTE — PROGRESS NOTE ADULT - SUBJECTIVE AND OBJECTIVE BOX
Date of Service  : 10-21-22     INTERVAL HPI/OVERNIGHT EVENTS: I feel fine.   Vital Signs Last 24 Hrs  T(C): 36.4 (21 Oct 2022 14:26), Max: 36.4 (20 Oct 2022 21:36)  T(F): 97.5 (21 Oct 2022 14:26), Max: 97.5 (20 Oct 2022 21:36)  HR: 56 (21 Oct 2022 14:26) (56 - 65)  BP: 131/84 (21 Oct 2022 14:26) (131/84 - 153/93)  BP(mean): --  RR: 16 (21 Oct 2022 14:26) (14 - 18)  SpO2: 94% (21 Oct 2022 14:26) (94% - 95%)    Parameters below as of 21 Oct 2022 14:26  Patient On (Oxygen Delivery Method): room air      I&O's Summary    MEDICATIONS  (STANDING):  ceFAZolin   IVPB 1000 milliGRAM(s) IV Intermittent every 8 hours  chlorhexidine 2% Cloths 1 Application(s) Topical <User Schedule>  dextrose 5%. 1000 milliLiter(s) (100 mL/Hr) IV Continuous <Continuous>  dextrose 5%. 1000 milliLiter(s) (50 mL/Hr) IV Continuous <Continuous>  dextrose 50% Injectable 25 Gram(s) IV Push once  dextrose 50% Injectable 12.5 Gram(s) IV Push once  dextrose 50% Injectable 25 Gram(s) IV Push once  enoxaparin Injectable 40 milliGRAM(s) SubCutaneous every 24 hours  gabapentin 100 milliGRAM(s) Oral two times a day  glucagon  Injectable 1 milliGRAM(s) IntraMuscular once  hydrochlorothiazide 25 milliGRAM(s) Oral daily  insulin lispro (ADMELOG) corrective regimen sliding scale   SubCutaneous three times a day before meals  insulin lispro (ADMELOG) corrective regimen sliding scale   SubCutaneous at bedtime  losartan 100 milliGRAM(s) Oral daily  montelukast 10 milliGRAM(s) Oral at bedtime  nystatin Powder 1 Application(s) Topical two times a day  pantoprazole    Tablet      pantoprazole    Tablet 40 milliGRAM(s) Oral before breakfast  propranolol  milliGRAM(s) Oral daily  sodium chloride 0.9%. 1000 milliLiter(s) (80 mL/Hr) IV Continuous <Continuous>    MEDICATIONS  (PRN):  acetaminophen     Tablet .. 650 milliGRAM(s) Oral every 6 hours PRN Temp greater or equal to 38C (100.4F), Mild Pain (1 - 3)  acetaminophen 300 mG/butalbital 50 mG/ caffeine 40 mG 1 Capsule(s) Oral every 6 hours PRN migraine headache  aluminum hydroxide/magnesium hydroxide/simethicone Suspension 30 milliLiter(s) Oral every 4 hours PRN Dyspepsia  dextrose Oral Gel 15 Gram(s) Oral once PRN Blood Glucose LESS THAN 70 milliGRAM(s)/deciliter  sodium chloride 0.9% lock flush 10 milliLiter(s) IV Push every 1 hour PRN Pre/post blood products, medications, blood draw, and to maintain line patency    LABS:              CAPILLARY BLOOD GLUCOSE      POCT Blood Glucose.: 116 mg/dL (21 Oct 2022 16:44)  POCT Blood Glucose.: 114 mg/dL (21 Oct 2022 12:01)  POCT Blood Glucose.: 107 mg/dL (21 Oct 2022 08:02)  POCT Blood Glucose.: 105 mg/dL (20 Oct 2022 21:48)          REVIEW OF SYSTEMS:  CONSTITUTIONAL: No fever, weight loss, or fatigue  EYES: No eye pain, visual disturbances, or discharge  ENMT:  No difficulty hearing, tinnitus, vertigo; No sinus or throat pain  NECK: No pain or stiffness  RESPIRATORY: No cough, wheezing, chills or hemoptysis; No shortness of breath  CARDIOVASCULAR: No chest pain, palpitations, dizziness, or leg swelling  GASTROINTESTINAL: No abdominal or epigastric pain. No nausea, vomiting, or hematemesis; No diarrhea or constipation. No melena or hematochezia.  GENITOURINARY: No dysuria, frequency, hematuria, or incontinence  NEUROLOGICAL: No headaches, memory loss, loss of strength, numbness, or tremors    Consultant(s) Notes Reviewed:  [x ] YES  [ ] NO    PHYSICAL EXAM:  GENERAL: NAD, well-groomed, well-developed,not in any distress ,  HEAD:  Atraumatic, Normocephalic  EYES: EOMI, PERRLA, conjunctiva and sclera clear  ENMT: No tonsillar erythema, exudates, or enlargement; Moist mucous membranes, Good dentition, No lesions  NECK: Supple, No JVD, Normal thyroid  NERVOUS SYSTEM:  Alert & Oriented X3, No focal deficit   CHEST/LUNG: Good air entry bilateral with no  rales, rhonchi, wheezing, or rubs  HEART: Regular rate and rhythm; No murmurs, rubs, or gallops  ABDOMEN: Soft, Nontender, Nondistended; Bowel sounds present  EXTREMITIES:  2+ Peripheral Pulses, No clubbing, cyanosis, or edema  SKIN: No rashes or lesions    Care Discussed with Consultants/Other Providers [ x] YES  [ ] NO

## 2022-10-21 NOTE — PROGRESS NOTE ADULT - PROBLEM SELECTOR PLAN 3
-  controlled   -  Continue with  HCTZ  -  Continue with Losartan  -  Continue with Propranolol with parameters   - monitor blood pressures

## 2022-10-21 NOTE — PROGRESS NOTE ADULT - PROBLEM SELECTOR PLAN 2
-  s/p I/D on 10/1   -  CT A/P showed right gluteal fold with infiltration w/o discrete drainable collection    -  Surgery followed No plans for acute surgical intervention   -  Continue daily packing   -  Continue antibiotics per ID: Ancef 1gm IV q8h until 11/6  -  ID Dr. Limon following

## 2022-10-21 NOTE — PROGRESS NOTE ADULT - ASSESSMENT
76 year old female from home with PMH of HTN, DM is here for worsening of perineal abscess. Patient presented with perineal abscess and had an I/D on 10/1 in the ED and was discharged on keflex. Presents with worsening purulent discharge from abscess, later found to + MSSA Bacteremia.  SX and ID Braxton followed, no acute sx intervention needed at this time. Started on IV Ancef, repeat CX negative. Plan for VERONICA in AM and PICC placement for 4 weeks of ABX. NCM to follow for home infusion services      Problem/Plan - 1:  ·  Problem: MSSA bacteremia.   ·  Plan: + MSSA bacteremia    source likely perineal Abscess   S/P I&D 10/1: cx +staph aureus  Continue Ancef 1g iv q8 till 11/06  repeat CX negative   S/P TTE FOUND No obvious vegetations noted  VERONICA r/o endocarditis  S/P  PICC line and 4 weeks of IV antibiotics .  ID helping.   < from: VERONICA w/Doppler (10.12.22 @ 11:15) >  ------------------------------------------------------------------------  CONCLUSIONS:  1. Normal mitral valve. Trace mitral regurgitation.  2. Normal trileaflet aortic valve. No aortic stenosis.  Trivial aortic regurgitation.  3. Normal Left Ventricular SystolicFunction,  (EF = 55 to  60%)  4. Normal right ventricular size and function. A catheter  is visualized in the right heart.  5. Normal tricuspid valve. Trace tricuspid regurgitation.  6. Normal pulmonic valve. Trace pulmonic insufficiency is  noted.  7. No pericardial effusion.  8. No obvious evidence of endocarditis    < end of copied text >     Problem/Plan - 2:  ·  Problem: Perineal abscess.   ·  Plan: s/p I/D on 10/1  CT A/P showed right gluteal fold with infiltration w/o discrete drainable collection    Surgery followed No plans for acute surgical intervention   Continue daily packing   Continue abx per ID: Ancef 1gm IV q8h  ID Dr. Limon following.     Problem/Plan - 3:  ·  Problem: HTN (hypertension).   ·  Plan: border line   likely 2/2 to meds held due to parameters   will change parameters   continue HCTz, lisinopril + propanolol   Monitor BP.     Problem/Plan - 4:  ·  Problem: Diabetes.   ·  Plan: A1C 6.4  Monitor glucose ac/HS and cover with Admelog S/s.     Problem/Plan - 5:  ·  Problem: SERGEY (acute kidney injury).   ·  Plan: now resolved   SCR now WNL   avoid nephrotoxins   monitor BMP.     Problem/Plan - 6:  ·  Problem: DVT prophylaxis.   ·  Plan: dvt: Lovenox  gi: protonix.     Problem/Plan - 7:  ·  Problem: Discharge planning issues.   ·  Plan: Pt from home      Dispo : DC planning on hold till 11/06 as finishing abxs.   DR Lu will cover this patient from tomorrow.

## 2022-10-21 NOTE — PROGRESS NOTE ADULT - SUBJECTIVE AND OBJECTIVE BOX
NP Note discussed with  primary attending    Patient is a 76y old  Female who presents with a chief complaint of Perineal abscess (19 Oct 2022 16:15)      INTERVAL HPI/OVERNIGHT EVENTS: no new complaints    MEDICATIONS  (STANDING):  ceFAZolin   IVPB 1000 milliGRAM(s) IV Intermittent every 8 hours  chlorhexidine 2% Cloths 1 Application(s) Topical <User Schedule>  dextrose 5%. 1000 milliLiter(s) (100 mL/Hr) IV Continuous <Continuous>  dextrose 5%. 1000 milliLiter(s) (50 mL/Hr) IV Continuous <Continuous>  dextrose 50% Injectable 25 Gram(s) IV Push once  dextrose 50% Injectable 12.5 Gram(s) IV Push once  dextrose 50% Injectable 25 Gram(s) IV Push once  enoxaparin Injectable 40 milliGRAM(s) SubCutaneous every 24 hours  gabapentin 100 milliGRAM(s) Oral two times a day  glucagon  Injectable 1 milliGRAM(s) IntraMuscular once  hydrochlorothiazide 25 milliGRAM(s) Oral daily  insulin lispro (ADMELOG) corrective regimen sliding scale   SubCutaneous three times a day before meals  insulin lispro (ADMELOG) corrective regimen sliding scale   SubCutaneous at bedtime  losartan 100 milliGRAM(s) Oral daily  montelukast 10 milliGRAM(s) Oral at bedtime  nystatin Powder 1 Application(s) Topical two times a day  pantoprazole    Tablet 40 milliGRAM(s) Oral before breakfast  pantoprazole    Tablet      propranolol  milliGRAM(s) Oral daily  sodium chloride 0.9%. 1000 milliLiter(s) (80 mL/Hr) IV Continuous <Continuous>    MEDICATIONS  (PRN):  acetaminophen     Tablet .. 650 milliGRAM(s) Oral every 6 hours PRN Temp greater or equal to 38C (100.4F), Mild Pain (1 - 3)  acetaminophen 300 mG/butalbital 50 mG/ caffeine 40 mG 1 Capsule(s) Oral every 6 hours PRN migraine headache  aluminum hydroxide/magnesium hydroxide/simethicone Suspension 30 milliLiter(s) Oral every 4 hours PRN Dyspepsia  dextrose Oral Gel 15 Gram(s) Oral once PRN Blood Glucose LESS THAN 70 milliGRAM(s)/deciliter  sodium chloride 0.9% lock flush 10 milliLiter(s) IV Push every 1 hour PRN Pre/post blood products, medications, blood draw, and to maintain line patency      __________________________________________________  REVIEW OF SYSTEMS:    CONSTITUTIONAL: No fever,   RESPIRATORY: No cough; No shortness of breath  CARDIOVASCULAR: No chest pain, no palpitations  GASTROINTESTINAL: No pain. No nausea or vomiting; No diarrhea   NEUROLOGICAL: No headache or numbness, no tremors  MUSCULOSKELETAL: No joint pain, no muscle pain  GENITOURINARY: no dysuria, no frequency, no hesitancy  e        Vital Signs Last 24 Hrs  T(C): 35.9 (21 Oct 2022 08:55), Max: 36.4 (20 Oct 2022 21:36)  T(F): 96.6 (21 Oct 2022 08:55), Max: 97.5 (20 Oct 2022 21:36)  HR: 65 (21 Oct 2022 10:22) (57 - 65)  BP: 148/90 (21 Oct 2022 08:55) (141/84 - 153/93)  BP(mean): --  RR: 14 (21 Oct 2022 10:22) (14 - 18)  SpO2: 95% (21 Oct 2022 10:22) (94% - 95%)    Parameters below as of 21 Oct 2022 10:22  Patient On (Oxygen Delivery Method): room air        ________________________________________________  PHYSICAL EXAM:  GENERAL: NAD  CHEST/LUNG: Clear to ausculitation bilaterally with good air entry   HEART: S1 S2  regular; no murmurs, gallops or rubs  ABDOMEN: Soft, Nontender, Nondistended; Bowel sounds present  EXTREMITIES: no cyanosis; no edema; no calf tenderness  left picc line   SKIN: warm and dry; no rash  NERVOUS SYSTEM:  Awake and alert; Oriented  to place, person and time ; no new deficits    _________________________________________________  LABS:              CAPILLARY BLOOD GLUCOSE      POCT Blood Glucose.: 114 mg/dL (21 Oct 2022 12:01)  POCT Blood Glucose.: 107 mg/dL (21 Oct 2022 08:02)  POCT Blood Glucose.: 105 mg/dL (20 Oct 2022 21:48)  POCT Blood Glucose.: 132 mg/dL (20 Oct 2022 16:32)        RADIOLOGY & ADDITIONAL TESTS:    Imaging Personally Reviewed:  YES/NO    Consultant(s) Notes Reviewed:   YES/ No    Care Discussed with Consultants :     Plan of care was discussed with patient and /or primary care giver; all questions and concerns were addressed and care was aligned with patient's wishes.

## 2022-10-22 LAB
ANION GAP SERPL CALC-SCNC: 9 MMOL/L — SIGNIFICANT CHANGE UP (ref 5–17)
BUN SERPL-MCNC: 24 MG/DL — HIGH (ref 7–18)
CALCIUM SERPL-MCNC: 9.4 MG/DL — SIGNIFICANT CHANGE UP (ref 8.4–10.5)
CHLORIDE SERPL-SCNC: 101 MMOL/L — SIGNIFICANT CHANGE UP (ref 96–108)
CO2 SERPL-SCNC: 26 MMOL/L — SIGNIFICANT CHANGE UP (ref 22–31)
CREAT SERPL-MCNC: 0.9 MG/DL — SIGNIFICANT CHANGE UP (ref 0.5–1.3)
EGFR: 66 ML/MIN/1.73M2 — SIGNIFICANT CHANGE UP
GLUCOSE BLDC GLUCOMTR-MCNC: 107 MG/DL — HIGH (ref 70–99)
GLUCOSE BLDC GLUCOMTR-MCNC: 108 MG/DL — HIGH (ref 70–99)
GLUCOSE BLDC GLUCOMTR-MCNC: 128 MG/DL — HIGH (ref 70–99)
GLUCOSE BLDC GLUCOMTR-MCNC: 135 MG/DL — HIGH (ref 70–99)
GLUCOSE SERPL-MCNC: 124 MG/DL — HIGH (ref 70–99)
HCT VFR BLD CALC: 39.3 % — SIGNIFICANT CHANGE UP (ref 34.5–45)
HGB BLD-MCNC: 12.8 G/DL — SIGNIFICANT CHANGE UP (ref 11.5–15.5)
MCHC RBC-ENTMCNC: 31.6 PG — SIGNIFICANT CHANGE UP (ref 27–34)
MCHC RBC-ENTMCNC: 32.6 GM/DL — SIGNIFICANT CHANGE UP (ref 32–36)
MCV RBC AUTO: 97 FL — SIGNIFICANT CHANGE UP (ref 80–100)
NRBC # BLD: 0 /100 WBCS — SIGNIFICANT CHANGE UP (ref 0–0)
PLATELET # BLD AUTO: 268 K/UL — SIGNIFICANT CHANGE UP (ref 150–400)
POTASSIUM SERPL-MCNC: 4.2 MMOL/L — SIGNIFICANT CHANGE UP (ref 3.5–5.3)
POTASSIUM SERPL-SCNC: 4.2 MMOL/L — SIGNIFICANT CHANGE UP (ref 3.5–5.3)
RBC # BLD: 4.05 M/UL — SIGNIFICANT CHANGE UP (ref 3.8–5.2)
RBC # FLD: 13.4 % — SIGNIFICANT CHANGE UP (ref 10.3–14.5)
SODIUM SERPL-SCNC: 136 MMOL/L — SIGNIFICANT CHANGE UP (ref 135–145)
WBC # BLD: 6.19 K/UL — SIGNIFICANT CHANGE UP (ref 3.8–10.5)
WBC # FLD AUTO: 6.19 K/UL — SIGNIFICANT CHANGE UP (ref 3.8–10.5)

## 2022-10-22 RX ADMIN — Medication 650 MILLIGRAM(S): at 19:36

## 2022-10-22 RX ADMIN — Medication 100 MILLIGRAM(S): at 05:38

## 2022-10-22 RX ADMIN — GABAPENTIN 100 MILLIGRAM(S): 400 CAPSULE ORAL at 05:16

## 2022-10-22 RX ADMIN — Medication 160 MILLIGRAM(S): at 05:16

## 2022-10-22 RX ADMIN — Medication 100 MILLIGRAM(S): at 21:12

## 2022-10-22 RX ADMIN — Medication 25 MILLIGRAM(S): at 05:15

## 2022-10-22 RX ADMIN — Medication 1 CAPSULE(S): at 22:30

## 2022-10-22 RX ADMIN — PANTOPRAZOLE SODIUM 40 MILLIGRAM(S): 20 TABLET, DELAYED RELEASE ORAL at 05:15

## 2022-10-22 RX ADMIN — Medication 650 MILLIGRAM(S): at 20:46

## 2022-10-22 RX ADMIN — MONTELUKAST 10 MILLIGRAM(S): 4 TABLET, CHEWABLE ORAL at 21:13

## 2022-10-22 RX ADMIN — Medication 1 CAPSULE(S): at 21:30

## 2022-10-22 RX ADMIN — GABAPENTIN 100 MILLIGRAM(S): 400 CAPSULE ORAL at 17:11

## 2022-10-22 RX ADMIN — ENOXAPARIN SODIUM 40 MILLIGRAM(S): 100 INJECTION SUBCUTANEOUS at 05:15

## 2022-10-22 RX ADMIN — LOSARTAN POTASSIUM 100 MILLIGRAM(S): 100 TABLET, FILM COATED ORAL at 05:16

## 2022-10-22 RX ADMIN — CHLORHEXIDINE GLUCONATE 1 APPLICATION(S): 213 SOLUTION TOPICAL at 05:15

## 2022-10-22 RX ADMIN — Medication 100 MILLIGRAM(S): at 13:42

## 2022-10-22 RX ADMIN — NYSTATIN CREAM 1 APPLICATION(S): 100000 CREAM TOPICAL at 05:16

## 2022-10-22 NOTE — PROGRESS NOTE ADULT - SUBJECTIVE AND OBJECTIVE BOX
INTERVAL HPI/OVERNIGHT EVENTS:  Patient seen,afebrile,no acute issues  VITAL SIGNS:  T(F): 97.2 (10-22-22 @ 05:05)  HR: 60 (10-22-22 @ 05:05)  BP: 110/80 (10-22-22 @ 05:05)  RR: 16 (10-22-22 @ 05:05)  SpO2: 94% (10-22-22 @ 05:05)  Wt(kg): --    PHYSICAL EXAM:  awake  Constitutional:  Eyes:  ENMT:perrla  Neck:  Respiratory:clear  Cardiovascular:s1s2,m-none  Gastrointestinal:soft,bs pos,perineal area dressing  Extremities:  Vascular:  Neurological:no focaldeficit  Musculoskeletal:    MEDICATIONS  (STANDING):  ceFAZolin   IVPB 1000 milliGRAM(s) IV Intermittent every 8 hours  chlorhexidine 2% Cloths 1 Application(s) Topical <User Schedule>  dextrose 5%. 1000 milliLiter(s) (100 mL/Hr) IV Continuous <Continuous>  dextrose 5%. 1000 milliLiter(s) (50 mL/Hr) IV Continuous <Continuous>  dextrose 50% Injectable 25 Gram(s) IV Push once  dextrose 50% Injectable 12.5 Gram(s) IV Push once  dextrose 50% Injectable 25 Gram(s) IV Push once  enoxaparin Injectable 40 milliGRAM(s) SubCutaneous every 24 hours  gabapentin 100 milliGRAM(s) Oral two times a day  glucagon  Injectable 1 milliGRAM(s) IntraMuscular once  hydrochlorothiazide 25 milliGRAM(s) Oral daily  insulin lispro (ADMELOG) corrective regimen sliding scale   SubCutaneous three times a day before meals  insulin lispro (ADMELOG) corrective regimen sliding scale   SubCutaneous at bedtime  losartan 100 milliGRAM(s) Oral daily  montelukast 10 milliGRAM(s) Oral at bedtime  nystatin Powder 1 Application(s) Topical two times a day  pantoprazole    Tablet 40 milliGRAM(s) Oral before breakfast  pantoprazole    Tablet      propranolol  milliGRAM(s) Oral daily  sodium chloride 0.9%. 1000 milliLiter(s) (80 mL/Hr) IV Continuous <Continuous>    MEDICATIONS  (PRN):  acetaminophen     Tablet .. 650 milliGRAM(s) Oral every 6 hours PRN Temp greater or equal to 38C (100.4F), Mild Pain (1 - 3)  acetaminophen 300 mG/butalbital 50 mG/ caffeine 40 mG 1 Capsule(s) Oral every 6 hours PRN migraine headache  aluminum hydroxide/magnesium hydroxide/simethicone Suspension 30 milliLiter(s) Oral every 4 hours PRN Dyspepsia  dextrose Oral Gel 15 Gram(s) Oral once PRN Blood Glucose LESS THAN 70 milliGRAM(s)/deciliter  sodium chloride 0.9% lock flush 10 milliLiter(s) IV Push every 1 hour PRN Pre/post blood products, medications, blood draw, and to maintain line patency      Allergies    codeine (Rash)    Intolerances        LABS:                        12.8   6.19  )-----------( 268      ( 22 Oct 2022 07:26 )             39.3     10-22    136  |  101  |  24<H>  ----------------------------<  124<H>  4.2   |  26  |  0.90    Ca    9.4      22 Oct 2022 07:26            RADIOLOGY & ADDITIONAL TESTS:      Assessment and Plan:   · Assessment	  76 year old female from home with PMH of HTN, DM is here for worsening of perineal abscess. Patient presented with perineal abscess and had an I/D on 10/1 in the ED and was discharged on keflex. Presents with worsening purulent discharge from abscess, later found to + MSSA Bacteremia.  SX and ID Braxton followed, no acute sx intervention needed at this time. Started on IV Ancef, repeat CX negative. Plan for VERONICA in AM and PICC placement for 4 weeks of ABX. NCM to follow for home infusion services      Problem/Plan - 1:  ·  Problem: MSSA bacteremia.   ·  Plan: + MSSA bacteremia    source likely perineal Abscess   S/P I&D 10/1: cx +staph aureus  Continue Ancef 1g iv q8 till 11/06  S/P TTE FOUND No obvious vegetations noted  VERONICA r/o endocarditis  S/P  PICC line and 4 weeks of IV antibiotics .  ID helping.      Problem/Plan - 2:  ·  Problem: Perineal abscess.   ·  Plan: s/p I/D on 10/1  CT A/P showed right gluteal fold with infiltration w/o discrete drainable collection    Surgery followed No plans for acute surgical intervention   Continue daily packing   Continue abx per ID: Ancef 1gm IV q8h  ID Dr. Limon following.     Problem/Plan - 3:  ·  Problem: HTN (hypertension)-fairly well controlled  likely 2/2 to meds held due to parameters   will change parameters   continue HCTz, lisinopril + propanolol   Monitor BP.     Problem/Plan - 4:  ·  Problem: Diabetes.   ·  Plan: A1C 6.4  Monitor glucose ac/HS and cover with Admelog S/s.     Problem/Plan - 5:  ·  Problem: SERGEY (acute kidney injury).   ·  Plan: now resolved   SCR now WNL   avoid nephrotoxins   monitor BMP.     Problem/Plan - 6:  ·  Problem: DVT prophylaxis.   ·  Plan: dvt: Lovenox  gi: protonix.     Problem/Plan - 7:  ·  Problem: Discharge planning issues.   ·  Plan: Pt from home  covering for dr tate

## 2022-10-23 LAB
GLUCOSE BLDC GLUCOMTR-MCNC: 107 MG/DL — HIGH (ref 70–99)
GLUCOSE BLDC GLUCOMTR-MCNC: 110 MG/DL — HIGH (ref 70–99)
GLUCOSE BLDC GLUCOMTR-MCNC: 92 MG/DL — SIGNIFICANT CHANGE UP (ref 70–99)
GLUCOSE BLDC GLUCOMTR-MCNC: 98 MG/DL — SIGNIFICANT CHANGE UP (ref 70–99)

## 2022-10-23 RX ADMIN — GABAPENTIN 100 MILLIGRAM(S): 400 CAPSULE ORAL at 17:41

## 2022-10-23 RX ADMIN — Medication 100 MILLIGRAM(S): at 05:18

## 2022-10-23 RX ADMIN — Medication 100 MILLIGRAM(S): at 21:03

## 2022-10-23 RX ADMIN — Medication 1 CAPSULE(S): at 18:41

## 2022-10-23 RX ADMIN — Medication 1 CAPSULE(S): at 20:05

## 2022-10-23 RX ADMIN — PANTOPRAZOLE SODIUM 40 MILLIGRAM(S): 20 TABLET, DELAYED RELEASE ORAL at 05:24

## 2022-10-23 RX ADMIN — Medication 100 MILLIGRAM(S): at 15:22

## 2022-10-23 RX ADMIN — MONTELUKAST 10 MILLIGRAM(S): 4 TABLET, CHEWABLE ORAL at 21:03

## 2022-10-23 RX ADMIN — CHLORHEXIDINE GLUCONATE 1 APPLICATION(S): 213 SOLUTION TOPICAL at 05:18

## 2022-10-23 RX ADMIN — NYSTATIN CREAM 1 APPLICATION(S): 100000 CREAM TOPICAL at 07:09

## 2022-10-23 RX ADMIN — LOSARTAN POTASSIUM 100 MILLIGRAM(S): 100 TABLET, FILM COATED ORAL at 05:23

## 2022-10-23 RX ADMIN — GABAPENTIN 100 MILLIGRAM(S): 400 CAPSULE ORAL at 05:21

## 2022-10-23 NOTE — PROGRESS NOTE ADULT - SUBJECTIVE AND OBJECTIVE BOX
INTERVAL HPI/OVERNIGHT EVENTS:  Patient seen,afebrile,no distress  VITAL SIGNS:  T(F): 96.8 (10-23-22 @ 05:19)  HR: 55 (10-23-22 @ 05:19)  BP: 145/91 (10-23-22 @ 05:19)  RR: 18 (10-23-22 @ 05:19)  SpO2: 96% (10-23-22 @ 05:19)  Wt(kg): --    PHYSICAL EXAM:  awake  Constitutional:  Eyes:  ENMT:perrla  Neck:  Respiratory:clear  Cardiovascular:s1s2,m-none  Gastrointestinal:soft,bs pos  Extremities:  Vascular:  Neurological:no focal deficit  Musculoskeletal:    MEDICATIONS  (STANDING):  ceFAZolin   IVPB 1000 milliGRAM(s) IV Intermittent every 8 hours  chlorhexidine 2% Cloths 1 Application(s) Topical <User Schedule>  dextrose 5%. 1000 milliLiter(s) (100 mL/Hr) IV Continuous <Continuous>  dextrose 5%. 1000 milliLiter(s) (50 mL/Hr) IV Continuous <Continuous>  dextrose 50% Injectable 25 Gram(s) IV Push once  dextrose 50% Injectable 25 Gram(s) IV Push once  dextrose 50% Injectable 12.5 Gram(s) IV Push once  enoxaparin Injectable 40 milliGRAM(s) SubCutaneous every 24 hours  gabapentin 100 milliGRAM(s) Oral two times a day  glucagon  Injectable 1 milliGRAM(s) IntraMuscular once  hydrochlorothiazide 25 milliGRAM(s) Oral daily  insulin lispro (ADMELOG) corrective regimen sliding scale   SubCutaneous three times a day before meals  insulin lispro (ADMELOG) corrective regimen sliding scale   SubCutaneous at bedtime  losartan 100 milliGRAM(s) Oral daily  montelukast 10 milliGRAM(s) Oral at bedtime  nystatin Powder 1 Application(s) Topical two times a day  pantoprazole    Tablet      pantoprazole    Tablet 40 milliGRAM(s) Oral before breakfast  propranolol  milliGRAM(s) Oral daily  sodium chloride 0.9%. 1000 milliLiter(s) (80 mL/Hr) IV Continuous <Continuous>    MEDICATIONS  (PRN):  acetaminophen     Tablet .. 650 milliGRAM(s) Oral every 6 hours PRN Temp greater or equal to 38C (100.4F), Mild Pain (1 - 3)  acetaminophen 300 mG/butalbital 50 mG/ caffeine 40 mG 1 Capsule(s) Oral every 6 hours PRN migraine headache  aluminum hydroxide/magnesium hydroxide/simethicone Suspension 30 milliLiter(s) Oral every 4 hours PRN Dyspepsia  dextrose Oral Gel 15 Gram(s) Oral once PRN Blood Glucose LESS THAN 70 milliGRAM(s)/deciliter  sodium chloride 0.9% lock flush 10 milliLiter(s) IV Push every 1 hour PRN Pre/post blood products, medications, blood draw, and to maintain line patency      Allergies    codeine (Rash)    Intolerances        LABS:                        12.8   6.19  )-----------( 268      ( 22 Oct 2022 07:26 )             39.3     10-22    136  |  101  |  24<H>  ----------------------------<  124<H>  4.2   |  26  |  0.90    Ca    9.4      22 Oct 2022 07:26            RADIOLOGY & ADDITIONAL TESTS:      Assessment and Plan:   · Assessment	  76 year old female from home with PMH of HTN, DM is here for worsening of perineal abscess. Patient presented with perineal abscess and had an I/D on 10/1 in the ED and was discharged on keflex. Presents with worsening purulent discharge from abscess, later found to + MSSA Bacteremia.  SX and TOMMY Limon followed, no acute sx intervention needed at this time. Started on IV Ancef, repeat CX negative. Plan for VERONICA in AM and PICC placement for 4 weeks of ABX. NCM to follow for home infusion services      Problem/Plan - 1:  ·  Problem: MSSA bacteremia.   ·  Plan: + MSSA bacteremia    source likely perineal Abscess   S/P I&D 10/1: cx +staph aureus  Continue Ancef 1g iv q8 till 11/06  S/P  PICC line and 4 weeks of IV antibiotics .  ID f/up appret     Problem/Plan - 2:  ·  Problem: Perineal abscess.   ·  Plan: s/p I/D on 10/1  CT A/P showed right gluteal fold with infiltration w/o discrete drainable collection    Surgery followed No plans for acute surgical intervention   Continue daily packing   Continue abx per ID: Ancef 1gm IV q8h  ID Dr. Limon following.     Problem/Plan - 3:  ·  Problem: HTN (hypertension)-fairly well controlled  likely 2/2 to meds held due to parameters   continue HCTz, lisinopril + propanolol   Monitor BP.     Problem/Plan - 4:  ·  Problem: Diabetes.   ·  Plan: A1C 6.4  Monitor glucose ac/HS and cover with Admelog S/s.     Problem/Plan - 5:  ·  Problem: SERGEY (acute kidney injury).   ·  Plan: now resolved   SCR now WNL   avoid nephrotoxins   monitor BMP.     Problem/Plan - 6:  ·  Problem: DVT prophylaxis.   ·  Plan: dvt: Lovenox  gi: protonix.     Problem/Plan - 7:  ·  Problem: Discharge planning issues.   ·  Plan: Pt from home  covering for dr tate

## 2022-10-24 LAB
ANION GAP SERPL CALC-SCNC: 9 MMOL/L — SIGNIFICANT CHANGE UP (ref 5–17)
BUN SERPL-MCNC: 19 MG/DL — HIGH (ref 7–18)
CALCIUM SERPL-MCNC: 9.5 MG/DL — SIGNIFICANT CHANGE UP (ref 8.4–10.5)
CHLORIDE SERPL-SCNC: 102 MMOL/L — SIGNIFICANT CHANGE UP (ref 96–108)
CO2 SERPL-SCNC: 26 MMOL/L — SIGNIFICANT CHANGE UP (ref 22–31)
CREAT SERPL-MCNC: 0.89 MG/DL — SIGNIFICANT CHANGE UP (ref 0.5–1.3)
EGFR: 67 ML/MIN/1.73M2 — SIGNIFICANT CHANGE UP
GLUCOSE BLDC GLUCOMTR-MCNC: 102 MG/DL — HIGH (ref 70–99)
GLUCOSE BLDC GLUCOMTR-MCNC: 106 MG/DL — HIGH (ref 70–99)
GLUCOSE BLDC GLUCOMTR-MCNC: 120 MG/DL — HIGH (ref 70–99)
GLUCOSE BLDC GLUCOMTR-MCNC: 88 MG/DL — SIGNIFICANT CHANGE UP (ref 70–99)
GLUCOSE SERPL-MCNC: 109 MG/DL — HIGH (ref 70–99)
HCT VFR BLD CALC: 38.5 % — SIGNIFICANT CHANGE UP (ref 34.5–45)
HGB BLD-MCNC: 12.4 G/DL — SIGNIFICANT CHANGE UP (ref 11.5–15.5)
MCHC RBC-ENTMCNC: 31.2 PG — SIGNIFICANT CHANGE UP (ref 27–34)
MCHC RBC-ENTMCNC: 32.2 GM/DL — SIGNIFICANT CHANGE UP (ref 32–36)
MCV RBC AUTO: 96.7 FL — SIGNIFICANT CHANGE UP (ref 80–100)
NRBC # BLD: 0 /100 WBCS — SIGNIFICANT CHANGE UP (ref 0–0)
PLATELET # BLD AUTO: 242 K/UL — SIGNIFICANT CHANGE UP (ref 150–400)
POTASSIUM SERPL-MCNC: 3.9 MMOL/L — SIGNIFICANT CHANGE UP (ref 3.5–5.3)
POTASSIUM SERPL-SCNC: 3.9 MMOL/L — SIGNIFICANT CHANGE UP (ref 3.5–5.3)
RBC # BLD: 3.98 M/UL — SIGNIFICANT CHANGE UP (ref 3.8–5.2)
RBC # FLD: 13.6 % — SIGNIFICANT CHANGE UP (ref 10.3–14.5)
SODIUM SERPL-SCNC: 137 MMOL/L — SIGNIFICANT CHANGE UP (ref 135–145)
WBC # BLD: 6.33 K/UL — SIGNIFICANT CHANGE UP (ref 3.8–10.5)
WBC # FLD AUTO: 6.33 K/UL — SIGNIFICANT CHANGE UP (ref 3.8–10.5)

## 2022-10-24 RX ADMIN — ENOXAPARIN SODIUM 40 MILLIGRAM(S): 100 INJECTION SUBCUTANEOUS at 05:48

## 2022-10-24 RX ADMIN — NYSTATIN CREAM 1 APPLICATION(S): 100000 CREAM TOPICAL at 05:26

## 2022-10-24 RX ADMIN — GABAPENTIN 100 MILLIGRAM(S): 400 CAPSULE ORAL at 05:26

## 2022-10-24 RX ADMIN — Medication 100 MILLIGRAM(S): at 23:20

## 2022-10-24 RX ADMIN — MONTELUKAST 10 MILLIGRAM(S): 4 TABLET, CHEWABLE ORAL at 23:20

## 2022-10-24 RX ADMIN — Medication 100 MILLIGRAM(S): at 13:19

## 2022-10-24 RX ADMIN — Medication 25 MILLIGRAM(S): at 05:25

## 2022-10-24 RX ADMIN — Medication 100 MILLIGRAM(S): at 05:24

## 2022-10-24 RX ADMIN — Medication 160 MILLIGRAM(S): at 05:25

## 2022-10-24 RX ADMIN — PANTOPRAZOLE SODIUM 40 MILLIGRAM(S): 20 TABLET, DELAYED RELEASE ORAL at 05:27

## 2022-10-24 RX ADMIN — GABAPENTIN 100 MILLIGRAM(S): 400 CAPSULE ORAL at 19:13

## 2022-10-24 RX ADMIN — LOSARTAN POTASSIUM 100 MILLIGRAM(S): 100 TABLET, FILM COATED ORAL at 05:25

## 2022-10-24 RX ADMIN — CHLORHEXIDINE GLUCONATE 1 APPLICATION(S): 213 SOLUTION TOPICAL at 05:24

## 2022-10-24 NOTE — PROGRESS NOTE ADULT - SUBJECTIVE AND OBJECTIVE BOX
NP Note discussed with  Primary Attending    Patient is a 76y old  Female who presents with a chief complaint of abscess (23 Oct 2022 06:33)      INTERVAL HPI/OVERNIGHT EVENTS: no new complaints    MEDICATIONS  (STANDING):  ceFAZolin   IVPB 1000 milliGRAM(s) IV Intermittent every 8 hours  chlorhexidine 2% Cloths 1 Application(s) Topical <User Schedule>  dextrose 5%. 1000 milliLiter(s) (100 mL/Hr) IV Continuous <Continuous>  dextrose 5%. 1000 milliLiter(s) (50 mL/Hr) IV Continuous <Continuous>  dextrose 50% Injectable 25 Gram(s) IV Push once  dextrose 50% Injectable 12.5 Gram(s) IV Push once  dextrose 50% Injectable 25 Gram(s) IV Push once  enoxaparin Injectable 40 milliGRAM(s) SubCutaneous every 24 hours  gabapentin 100 milliGRAM(s) Oral two times a day  glucagon  Injectable 1 milliGRAM(s) IntraMuscular once  hydrochlorothiazide 25 milliGRAM(s) Oral daily  insulin lispro (ADMELOG) corrective regimen sliding scale   SubCutaneous three times a day before meals  insulin lispro (ADMELOG) corrective regimen sliding scale   SubCutaneous at bedtime  losartan 100 milliGRAM(s) Oral daily  montelukast 10 milliGRAM(s) Oral at bedtime  nystatin Powder 1 Application(s) Topical two times a day  pantoprazole    Tablet 40 milliGRAM(s) Oral before breakfast  pantoprazole    Tablet      propranolol  milliGRAM(s) Oral daily  sodium chloride 0.9%. 1000 milliLiter(s) (80 mL/Hr) IV Continuous <Continuous>    MEDICATIONS  (PRN):  acetaminophen     Tablet .. 650 milliGRAM(s) Oral every 6 hours PRN Temp greater or equal to 38C (100.4F), Mild Pain (1 - 3)  acetaminophen 300 mG/butalbital 50 mG/ caffeine 40 mG 1 Capsule(s) Oral every 6 hours PRN migraine headache  aluminum hydroxide/magnesium hydroxide/simethicone Suspension 30 milliLiter(s) Oral every 4 hours PRN Dyspepsia  dextrose Oral Gel 15 Gram(s) Oral once PRN Blood Glucose LESS THAN 70 milliGRAM(s)/deciliter  sodium chloride 0.9% lock flush 10 milliLiter(s) IV Push every 1 hour PRN Pre/post blood products, medications, blood draw, and to maintain line patency      __________________________________________________  REVIEW OF SYSTEMS:    CONSTITUTIONAL: No fever,   EYES: no acute visual disturbances  NECK: No pain or stiffness  RESPIRATORY: No cough; No shortness of breath  CARDIOVASCULAR: No chest pain, no palpitations  GASTROINTESTINAL: No pain. No nausea or vomiting; No diarrhea   NEUROLOGICAL: No headache or numbness, no tremors  MUSCULOSKELETAL: No joint pain, no muscle pain  GENITOURINARY: no dysuria, no frequency, no hesitancy  PSYCHIATRY: no depression , no anxiety  ALL OTHER  ROS negative        Vital Signs Last 24 Hrs  T(C): 35.8 (24 Oct 2022 05:28), Max: 36.6 (23 Oct 2022 21:32)  T(F): 96.5 (24 Oct 2022 05:28), Max: 97.8 (23 Oct 2022 21:32)  HR: 65 (24 Oct 2022 05:28) (58 - 65)  BP: 135/96 (24 Oct 2022 05:28) (135/96 - 149/90)  BP(mean): --  RR: 18 (24 Oct 2022 05:28) (18 - 18)  SpO2: 95% (24 Oct 2022 05:28) (94% - 96%)    Parameters below as of 24 Oct 2022 05:28  Patient On (Oxygen Delivery Method): room air        ________________________________________________  PHYSICAL EXAM:  GENERAL: NAD  HEENT: Normocephalic;  conjunctivae and sclerae clear; moist mucous membranes;   NECK : supple  CHEST/LUNG: Clear to auscultation bilaterally with good air entry   HEART: S1 S2  regular; no murmurs, gallops or rubs  ABDOMEN: Soft, Nontender, Nondistended; Bowel sounds present  EXTREMITIES: no cyanosis; no edema; no calf tenderness  SKIN: warm and dry; no rash  NERVOUS SYSTEM:  Awake and alert; Oriented  to place, person and time ; no new deficits    _________________________________________________  LABS:                        12.4   6.33  )-----------( 242      ( 24 Oct 2022 06:45 )             38.5     10-24    137  |  102  |  19<H>  ----------------------------<  109<H>  3.9   |  26  |  0.89    Ca    9.5      24 Oct 2022 06:45    CAPILLARY BLOOD GLUCOSE      POCT Blood Glucose.: 88 mg/dL (24 Oct 2022 11:20)  POCT Blood Glucose.: 106 mg/dL (24 Oct 2022 07:57)  POCT Blood Glucose.: 110 mg/dL (23 Oct 2022 22:00)  POCT Blood Glucose.: 107 mg/dL (23 Oct 2022 16:31)    RADIOLOGY & ADDITIONAL TESTS:    Imaging  Reviewed:  YES    Consultant(s) Notes Reviewed:   YES      Plan of care was discussed with patienT; all questions and concerns were addressed

## 2022-10-25 LAB
GLUCOSE BLDC GLUCOMTR-MCNC: 101 MG/DL — HIGH (ref 70–99)
GLUCOSE BLDC GLUCOMTR-MCNC: 109 MG/DL — HIGH (ref 70–99)
GLUCOSE BLDC GLUCOMTR-MCNC: 116 MG/DL — HIGH (ref 70–99)
GLUCOSE BLDC GLUCOMTR-MCNC: 117 MG/DL — HIGH (ref 70–99)
SARS-COV-2 RNA SPEC QL NAA+PROBE: SIGNIFICANT CHANGE UP

## 2022-10-25 RX ADMIN — Medication 1 CAPSULE(S): at 17:55

## 2022-10-25 RX ADMIN — GABAPENTIN 100 MILLIGRAM(S): 400 CAPSULE ORAL at 05:16

## 2022-10-25 RX ADMIN — Medication 25 MILLIGRAM(S): at 05:16

## 2022-10-25 RX ADMIN — CHLORHEXIDINE GLUCONATE 1 APPLICATION(S): 213 SOLUTION TOPICAL at 05:21

## 2022-10-25 RX ADMIN — PANTOPRAZOLE SODIUM 40 MILLIGRAM(S): 20 TABLET, DELAYED RELEASE ORAL at 06:08

## 2022-10-25 RX ADMIN — Medication 100 MILLIGRAM(S): at 15:38

## 2022-10-25 RX ADMIN — Medication 100 MILLIGRAM(S): at 21:36

## 2022-10-25 RX ADMIN — Medication 160 MILLIGRAM(S): at 05:16

## 2022-10-25 RX ADMIN — GABAPENTIN 100 MILLIGRAM(S): 400 CAPSULE ORAL at 17:19

## 2022-10-25 RX ADMIN — ENOXAPARIN SODIUM 40 MILLIGRAM(S): 100 INJECTION SUBCUTANEOUS at 06:07

## 2022-10-25 RX ADMIN — MONTELUKAST 10 MILLIGRAM(S): 4 TABLET, CHEWABLE ORAL at 21:35

## 2022-10-25 RX ADMIN — Medication 100 MILLIGRAM(S): at 05:15

## 2022-10-25 RX ADMIN — Medication 1 CAPSULE(S): at 17:19

## 2022-10-25 RX ADMIN — LOSARTAN POTASSIUM 100 MILLIGRAM(S): 100 TABLET, FILM COATED ORAL at 05:16

## 2022-10-25 NOTE — PROGRESS NOTE ADULT - SUBJECTIVE AND OBJECTIVE BOX
76y Female is under our care for right buttock abscess (s/p I&D) and MSSA bacteremia. Patient is doing well and is staying in house to complete her treatment since she is not able to afford the outpatient infusion cost. Remains afebrile with normal wbc count.     MEDS:  ceFAZolin   IVPB 1000 milliGRAM(s) IV Intermittent every 8 hours    ALLERGIES: Allergies    codeine (Rash)    Intolerances    REVIEW OF SYSTEMS:  [  ] Not able to illicit  General: no fevers no malaise  Chest: no cough no sob  GI: no nvd  : no urinary sxs   Skin: no rashes  Musculoskeletal: no trauma no LBP  Neuro: no ha's no dizziness     VITALS:  Vital Signs Last 24 Hrs  T(C): 36.7 (10-25-22 @ 12:25), Max: 36.7 (10-25-22 @ 12:25)  T(F): 98.1 (10-25-22 @ 12:25), Max: 98.1 (10-25-22 @ 12:25)  HR: 57 (10-25-22 @ 12:25) (56 - 59)  BP: 142/79 (10-25-22 @ 12:25) (141/78 - 156/96)  BP(mean): --  RR: 17 (10-25-22 @ 12:25) (17 - 18)  SpO2: 95% (10-25-22 @ 12:25) (95% - 96%)    PHYSICAL EXAM:  HEENT: n/a  Neck: supple no LN's   Respiratory: lungs clear no rales  Cardiovascular: S1 S2 reg no murmurs  Gastrointestinal: +BS with soft, nondistended abdomen; nontender  Extremities: no edema, +left arm PICC line  Skin: no rashes  Ortho: n/a  Neuro: awake and alert    LABS/DIAGNOSTIC TESTS:  No new labs                            12.4   6.33  )-----------( 242      ( 24 Oct 2022 06:45 )             38.5   WBC Count: 6.33 K/uL (10-24 @ 06:45)  WBC Count: 6.19 K/uL (10-22 @ 07:26)    10-24    137  |  102  |  19<H>  ----------------------------<  109<H>  3.9   |  26  |  0.89    Ca    9.5      24 Oct 2022 06:45        CULTURES:   .Blood Blood-Peripheral  10-09 @ 06:24   No Growth Final  --  --      .Blood Blood-Peripheral  10-09 @ 06:02   No Growth Final  --  --      .Blood Blood-Peripheral  10-06 @ 12:00   Growth in aerobic and anaerobic bottles: Staphylococcus aureus  See previous culture  [id number]  --  Blood Culture PCR  Staphylococcus aureus      .Abscess Leg - Right  10-01 @ 13:10   Numerous Staphylococcus aureus  --  Staphylococcus aureus      RADIOLOGY:  no new studies

## 2022-10-25 NOTE — PROGRESS NOTE ADULT - ASSESSMENT
Right buttock abscess - s/p I&D   MSSA bacteremia - repeat BC are negative     Plan:   ·	continue Ancef 1g iv q8h until 11/06  ·	patient is staying inhouse to complete treatment due to issue with cost

## 2022-10-25 NOTE — PROGRESS NOTE ADULT - SUBJECTIVE AND OBJECTIVE BOX
NP Note discussed with  Primary Attending    Patient is a 76y old  Female who presents with a chief complaint of abscess (23 Oct 2022 06:33)      INTERVAL HPI/OVERNIGHT EVENTS: no new complaints    MEDICATIONS  (STANDING):  ceFAZolin   IVPB 1000 milliGRAM(s) IV Intermittent every 8 hours  chlorhexidine 2% Cloths 1 Application(s) Topical <User Schedule>  dextrose 5%. 1000 milliLiter(s) (100 mL/Hr) IV Continuous <Continuous>  dextrose 5%. 1000 milliLiter(s) (50 mL/Hr) IV Continuous <Continuous>  dextrose 50% Injectable 25 Gram(s) IV Push once  dextrose 50% Injectable 12.5 Gram(s) IV Push once  dextrose 50% Injectable 25 Gram(s) IV Push once  enoxaparin Injectable 40 milliGRAM(s) SubCutaneous every 24 hours  gabapentin 100 milliGRAM(s) Oral two times a day  glucagon  Injectable 1 milliGRAM(s) IntraMuscular once  hydrochlorothiazide 25 milliGRAM(s) Oral daily  insulin lispro (ADMELOG) corrective regimen sliding scale   SubCutaneous three times a day before meals  insulin lispro (ADMELOG) corrective regimen sliding scale   SubCutaneous at bedtime  losartan 100 milliGRAM(s) Oral daily  montelukast 10 milliGRAM(s) Oral at bedtime  nystatin Powder 1 Application(s) Topical two times a day  pantoprazole    Tablet      pantoprazole    Tablet 40 milliGRAM(s) Oral before breakfast  propranolol  milliGRAM(s) Oral daily  sodium chloride 0.9%. 1000 milliLiter(s) (80 mL/Hr) IV Continuous <Continuous>    MEDICATIONS  (PRN):  acetaminophen     Tablet .. 650 milliGRAM(s) Oral every 6 hours PRN Temp greater or equal to 38C (100.4F), Mild Pain (1 - 3)  acetaminophen 300 mG/butalbital 50 mG/ caffeine 40 mG 1 Capsule(s) Oral every 6 hours PRN migraine headache  aluminum hydroxide/magnesium hydroxide/simethicone Suspension 30 milliLiter(s) Oral every 4 hours PRN Dyspepsia  dextrose Oral Gel 15 Gram(s) Oral once PRN Blood Glucose LESS THAN 70 milliGRAM(s)/deciliter  sodium chloride 0.9% lock flush 10 milliLiter(s) IV Push every 1 hour PRN Pre/post blood products, medications, blood draw, and to maintain line patency      __________________________________________________  REVIEW OF SYSTEMS:    CONSTITUTIONAL: No fever,   EYES: no acute visual disturbances  NECK: No pain or stiffness  RESPIRATORY: No cough; No shortness of breath  CARDIOVASCULAR: No chest pain, no palpitations  GASTROINTESTINAL: No pain. No nausea or vomiting; No diarrhea   NEUROLOGICAL: No headache or numbness, no tremors  MUSCULOSKELETAL: No joint pain, no muscle pain  GENITOURINARY: no dysuria, no frequency, no hesitancy  PSYCHIATRY: no depression , no anxiety  ALL OTHER  ROS negative        Vital Signs Last 24 Hrs  T(C): 36.7 (25 Oct 2022 12:25), Max: 36.7 (25 Oct 2022 12:25)  T(F): 98.1 (25 Oct 2022 12:25), Max: 98.1 (25 Oct 2022 12:25)  HR: 57 (25 Oct 2022 12:25) (56 - 59)  BP: 142/79 (25 Oct 2022 12:25) (141/78 - 156/96)  BP(mean): --  RR: 17 (25 Oct 2022 12:25) (17 - 18)  SpO2: 95% (25 Oct 2022 12:25) (95% - 96%)    Parameters below as of 25 Oct 2022 12:25  Patient On (Oxygen Delivery Method): room air        ________________________________________________  PHYSICAL EXAM:  GENERAL: NAD, sitting upright at side of bed   HEENT: Normocephalic;  conjunctivae and sclerae clear; moist mucous membranes;   NECK : supple  CHEST/LUNG: Clear to auscultation bilaterally with good air entry   HEART: S1 S2  regular; no murmurs, gallops or rubs  ABDOMEN: Soft, Nontender, Nondistended; Bowel sounds present  EXTREMITIES: no cyanosis; no edema; no calf tenderness  SKIN: warm and dry; no rash  NERVOUS SYSTEM:  Awake and alert; Oriented  to place, person and time ; no new deficits    _________________________________________________  LABS:                        12.4   6.33  )-----------( 242      ( 24 Oct 2022 06:45 )             38.5     10-24    137  |  102  |  19<H>  ----------------------------<  109<H>  3.9   |  26  |  0.89    Ca    9.5      24 Oct 2022 06:45          CAPILLARY BLOOD GLUCOSE      POCT Blood Glucose.: 116 mg/dL (25 Oct 2022 11:48)  POCT Blood Glucose.: 109 mg/dL (25 Oct 2022 07:46)  POCT Blood Glucose.: 120 mg/dL (24 Oct 2022 21:28)  POCT Blood Glucose.: 102 mg/dL (24 Oct 2022 16:46)    RADIOLOGY & ADDITIONAL TESTS:    Imaging  Reviewed:  YES    Consultant(s) Notes Reviewed:   YES       Plan of care was discussed with patient; all questions and concerns were addressed

## 2022-10-26 DIAGNOSIS — Z29.9 ENCOUNTER FOR PROPHYLACTIC MEASURES, UNSPECIFIED: ICD-10-CM

## 2022-10-26 LAB
ANION GAP SERPL CALC-SCNC: 7 MMOL/L — SIGNIFICANT CHANGE UP (ref 5–17)
BUN SERPL-MCNC: 23 MG/DL — HIGH (ref 7–18)
CALCIUM SERPL-MCNC: 9.7 MG/DL — SIGNIFICANT CHANGE UP (ref 8.4–10.5)
CHLORIDE SERPL-SCNC: 101 MMOL/L — SIGNIFICANT CHANGE UP (ref 96–108)
CO2 SERPL-SCNC: 29 MMOL/L — SIGNIFICANT CHANGE UP (ref 22–31)
CREAT SERPL-MCNC: 0.86 MG/DL — SIGNIFICANT CHANGE UP (ref 0.5–1.3)
EGFR: 70 ML/MIN/1.73M2 — SIGNIFICANT CHANGE UP
GLUCOSE BLDC GLUCOMTR-MCNC: 104 MG/DL — HIGH (ref 70–99)
GLUCOSE BLDC GLUCOMTR-MCNC: 110 MG/DL — HIGH (ref 70–99)
GLUCOSE BLDC GLUCOMTR-MCNC: 98 MG/DL — SIGNIFICANT CHANGE UP (ref 70–99)
GLUCOSE BLDC GLUCOMTR-MCNC: 99 MG/DL — SIGNIFICANT CHANGE UP (ref 70–99)
GLUCOSE SERPL-MCNC: 110 MG/DL — HIGH (ref 70–99)
HCT VFR BLD CALC: 42.7 % — SIGNIFICANT CHANGE UP (ref 34.5–45)
HGB BLD-MCNC: 13.8 G/DL — SIGNIFICANT CHANGE UP (ref 11.5–15.5)
MCHC RBC-ENTMCNC: 31.1 PG — SIGNIFICANT CHANGE UP (ref 27–34)
MCHC RBC-ENTMCNC: 32.3 GM/DL — SIGNIFICANT CHANGE UP (ref 32–36)
MCV RBC AUTO: 96.2 FL — SIGNIFICANT CHANGE UP (ref 80–100)
NRBC # BLD: 0 /100 WBCS — SIGNIFICANT CHANGE UP (ref 0–0)
PLATELET # BLD AUTO: 263 K/UL — SIGNIFICANT CHANGE UP (ref 150–400)
POTASSIUM SERPL-MCNC: 4.2 MMOL/L — SIGNIFICANT CHANGE UP (ref 3.5–5.3)
POTASSIUM SERPL-SCNC: 4.2 MMOL/L — SIGNIFICANT CHANGE UP (ref 3.5–5.3)
RBC # BLD: 4.44 M/UL — SIGNIFICANT CHANGE UP (ref 3.8–5.2)
RBC # FLD: 13.5 % — SIGNIFICANT CHANGE UP (ref 10.3–14.5)
SODIUM SERPL-SCNC: 137 MMOL/L — SIGNIFICANT CHANGE UP (ref 135–145)
WBC # BLD: 6.96 K/UL — SIGNIFICANT CHANGE UP (ref 3.8–10.5)
WBC # FLD AUTO: 6.96 K/UL — SIGNIFICANT CHANGE UP (ref 3.8–10.5)

## 2022-10-26 RX ADMIN — CHLORHEXIDINE GLUCONATE 1 APPLICATION(S): 213 SOLUTION TOPICAL at 05:50

## 2022-10-26 RX ADMIN — ENOXAPARIN SODIUM 40 MILLIGRAM(S): 100 INJECTION SUBCUTANEOUS at 05:51

## 2022-10-26 RX ADMIN — LOSARTAN POTASSIUM 100 MILLIGRAM(S): 100 TABLET, FILM COATED ORAL at 05:51

## 2022-10-26 RX ADMIN — PANTOPRAZOLE SODIUM 40 MILLIGRAM(S): 20 TABLET, DELAYED RELEASE ORAL at 06:00

## 2022-10-26 RX ADMIN — GABAPENTIN 100 MILLIGRAM(S): 400 CAPSULE ORAL at 17:20

## 2022-10-26 RX ADMIN — Medication 1 CAPSULE(S): at 23:48

## 2022-10-26 RX ADMIN — Medication 100 MILLIGRAM(S): at 13:28

## 2022-10-26 RX ADMIN — NYSTATIN CREAM 1 APPLICATION(S): 100000 CREAM TOPICAL at 05:52

## 2022-10-26 RX ADMIN — GABAPENTIN 100 MILLIGRAM(S): 400 CAPSULE ORAL at 05:51

## 2022-10-26 RX ADMIN — Medication 100 MILLIGRAM(S): at 05:50

## 2022-10-26 RX ADMIN — MONTELUKAST 10 MILLIGRAM(S): 4 TABLET, CHEWABLE ORAL at 21:23

## 2022-10-26 RX ADMIN — Medication 100 MILLIGRAM(S): at 21:23

## 2022-10-26 NOTE — PROGRESS NOTE ADULT - SUBJECTIVE AND OBJECTIVE BOX
NP Note discussed with  Primary Attending    Patient is a 76y old  Female who presents with a chief complaint of abscess (23 Oct 2022 06:33)      INTERVAL HPI/OVERNIGHT EVENTS: no new complaints    MEDICATIONS  (STANDING):  ceFAZolin   IVPB 1000 milliGRAM(s) IV Intermittent every 8 hours  chlorhexidine 2% Cloths 1 Application(s) Topical <User Schedule>  dextrose 5%. 1000 milliLiter(s) (100 mL/Hr) IV Continuous <Continuous>  dextrose 5%. 1000 milliLiter(s) (50 mL/Hr) IV Continuous <Continuous>  dextrose 50% Injectable 25 Gram(s) IV Push once  dextrose 50% Injectable 12.5 Gram(s) IV Push once  dextrose 50% Injectable 25 Gram(s) IV Push once  enoxaparin Injectable 40 milliGRAM(s) SubCutaneous every 24 hours  gabapentin 100 milliGRAM(s) Oral two times a day  glucagon  Injectable 1 milliGRAM(s) IntraMuscular once  hydrochlorothiazide 25 milliGRAM(s) Oral daily  insulin lispro (ADMELOG) corrective regimen sliding scale   SubCutaneous three times a day before meals  insulin lispro (ADMELOG) corrective regimen sliding scale   SubCutaneous at bedtime  losartan 100 milliGRAM(s) Oral daily  montelukast 10 milliGRAM(s) Oral at bedtime  nystatin Powder 1 Application(s) Topical two times a day  pantoprazole    Tablet      pantoprazole    Tablet 40 milliGRAM(s) Oral before breakfast  propranolol  milliGRAM(s) Oral daily  sodium chloride 0.9%. 1000 milliLiter(s) (80 mL/Hr) IV Continuous <Continuous>    MEDICATIONS  (PRN):  acetaminophen     Tablet .. 650 milliGRAM(s) Oral every 6 hours PRN Temp greater or equal to 38C (100.4F), Mild Pain (1 - 3)  acetaminophen 300 mG/butalbital 50 mG/ caffeine 40 mG 1 Capsule(s) Oral every 6 hours PRN migraine headache  aluminum hydroxide/magnesium hydroxide/simethicone Suspension 30 milliLiter(s) Oral every 4 hours PRN Dyspepsia  dextrose Oral Gel 15 Gram(s) Oral once PRN Blood Glucose LESS THAN 70 milliGRAM(s)/deciliter  sodium chloride 0.9% lock flush 10 milliLiter(s) IV Push every 1 hour PRN Pre/post blood products, medications, blood draw, and to maintain line patency      __________________________________________________  REVIEW OF SYSTEMS:    CONSTITUTIONAL: No fever,   EYES: no acute visual disturbances  NECK: No pain or stiffness  RESPIRATORY: No cough; No shortness of breath  CARDIOVASCULAR: No chest pain, no palpitations  GASTROINTESTINAL: No pain. No nausea or vomiting; No diarrhea   NEUROLOGICAL: No headache or numbness, no tremors  MUSCULOSKELETAL: No joint pain, no muscle pain  GENITOURINARY: no dysuria, no frequency, no hesitancy  PSYCHIATRY: no depression , no anxiety  ALL OTHER  ROS negative        Vital Signs Last 24 Hrs  T(C): 36.6 (26 Oct 2022 05:00), Max: 36.7 (25 Oct 2022 12:25)  T(F): 97.9 (26 Oct 2022 05:00), Max: 98.1 (25 Oct 2022 12:25)  HR: 56 (26 Oct 2022 05:00) (56 - 64)  BP: 123/82 (26 Oct 2022 05:00) (123/82 - 142/79)  BP(mean): --  RR: 18 (26 Oct 2022 05:00) (17 - 18)  SpO2: 95% (26 Oct 2022 05:00) (95% - 96%)    Parameters below as of 26 Oct 2022 05:00  Patient On (Oxygen Delivery Method): room air        ________________________________________________  PHYSICAL EXAM:  GENERAL: NAD  HEENT: Normocephalic;  conjunctivae and sclerae clear; moist mucous membranes;   NECK : supple  CHEST/LUNG: Clear to auscultation bilaterally with good air entry   HEART: S1 S2  regular; no murmurs, gallops or rubs  ABDOMEN: Soft, Nontender, Nondistended; Bowel sounds present  EXTREMITIES: no cyanosis; no edema; no calf tenderness  SKIN: warm and dry; no rash  NERVOUS SYSTEM:  Awake and alert; Oriented  to place, person and time ; no new deficits    _________________________________________________  LABS:                        13.8   6.96  )-----------( 263      ( 26 Oct 2022 07:29 )             42.7     10-26    137  |  101  |  23<H>  ----------------------------<  110<H>  4.2   |  29  |  0.86    Ca    9.7      26 Oct 2022 07:29          CAPILLARY BLOOD GLUCOSE      POCT Blood Glucose.: 104 mg/dL (26 Oct 2022 07:49)  POCT Blood Glucose.: 101 mg/dL (25 Oct 2022 21:35)  POCT Blood Glucose.: 117 mg/dL (25 Oct 2022 16:37)  POCT Blood Glucose.: 116 mg/dL (25 Oct 2022 11:48)        RADIOLOGY & ADDITIONAL TESTS:    Imaging  Reviewed:  YES/NO    Consultant(s) Notes Reviewed:   YES/ No      Plan of care was discussed with patient and /or primary care giver; all questions and concerns were addressed  NP Note discussed with  Primary Attending    Patient is a 76y old  Female who presents with a chief complaint of abscess (23 Oct 2022 06:33)      INTERVAL HPI/OVERNIGHT EVENTS: no new complaints    MEDICATIONS  (STANDING):  ceFAZolin   IVPB 1000 milliGRAM(s) IV Intermittent every 8 hours  chlorhexidine 2% Cloths 1 Application(s) Topical <User Schedule>  dextrose 5%. 1000 milliLiter(s) (100 mL/Hr) IV Continuous <Continuous>  dextrose 5%. 1000 milliLiter(s) (50 mL/Hr) IV Continuous <Continuous>  dextrose 50% Injectable 25 Gram(s) IV Push once  dextrose 50% Injectable 12.5 Gram(s) IV Push once  dextrose 50% Injectable 25 Gram(s) IV Push once  enoxaparin Injectable 40 milliGRAM(s) SubCutaneous every 24 hours  gabapentin 100 milliGRAM(s) Oral two times a day  glucagon  Injectable 1 milliGRAM(s) IntraMuscular once  hydrochlorothiazide 25 milliGRAM(s) Oral daily  insulin lispro (ADMELOG) corrective regimen sliding scale   SubCutaneous three times a day before meals  insulin lispro (ADMELOG) corrective regimen sliding scale   SubCutaneous at bedtime  losartan 100 milliGRAM(s) Oral daily  montelukast 10 milliGRAM(s) Oral at bedtime  nystatin Powder 1 Application(s) Topical two times a day  pantoprazole    Tablet      pantoprazole    Tablet 40 milliGRAM(s) Oral before breakfast  propranolol  milliGRAM(s) Oral daily  sodium chloride 0.9%. 1000 milliLiter(s) (80 mL/Hr) IV Continuous <Continuous>    MEDICATIONS  (PRN):  acetaminophen     Tablet .. 650 milliGRAM(s) Oral every 6 hours PRN Temp greater or equal to 38C (100.4F), Mild Pain (1 - 3)  acetaminophen 300 mG/butalbital 50 mG/ caffeine 40 mG 1 Capsule(s) Oral every 6 hours PRN migraine headache  aluminum hydroxide/magnesium hydroxide/simethicone Suspension 30 milliLiter(s) Oral every 4 hours PRN Dyspepsia  dextrose Oral Gel 15 Gram(s) Oral once PRN Blood Glucose LESS THAN 70 milliGRAM(s)/deciliter  sodium chloride 0.9% lock flush 10 milliLiter(s) IV Push every 1 hour PRN Pre/post blood products, medications, blood draw, and to maintain line patency      __________________________________________________  REVIEW OF SYSTEMS:    CONSTITUTIONAL: No fever,   EYES: no acute visual disturbances  NECK: No pain or stiffness  RESPIRATORY: No cough; No shortness of breath  CARDIOVASCULAR: No chest pain, no palpitations  GASTROINTESTINAL: No pain. No nausea or vomiting; No diarrhea   NEUROLOGICAL: No headache or numbness, no tremors  MUSCULOSKELETAL: No joint pain, no muscle pain  GENITOURINARY: no dysuria, no frequency, no hesitancy  PSYCHIATRY: no depression , no anxiety  ALL OTHER  ROS negative        Vital Signs Last 24 Hrs  T(C): 36.6 (26 Oct 2022 05:00), Max: 36.7 (25 Oct 2022 12:25)  T(F): 97.9 (26 Oct 2022 05:00), Max: 98.1 (25 Oct 2022 12:25)  HR: 56 (26 Oct 2022 05:00) (56 - 64)  BP: 123/82 (26 Oct 2022 05:00) (123/82 - 142/79)  BP(mean): --  RR: 18 (26 Oct 2022 05:00) (17 - 18)  SpO2: 95% (26 Oct 2022 05:00) (95% - 96%)    Parameters below as of 26 Oct 2022 05:00  Patient On (Oxygen Delivery Method): room air        ________________________________________________  PHYSICAL EXAM:  GENERAL: NAD  HEENT: Normocephalic;  conjunctivae and sclerae clear; moist mucous membranes;   NECK : supple  CHEST/LUNG: Clear to auscultation bilaterally with good air entry   HEART: S1 S2  regular; no murmurs, gallops or rubs  ABDOMEN: Soft, Nontender, Nondistended; Bowel sounds present  EXTREMITIES: no cyanosis; no edema; no calf tenderness, prominant variscose veins BLEs, 2+ radial and pedal pulses  SKIN: warm and dry; no rash  NERVOUS SYSTEM:  Awake and alert; Oriented  to place, person and time ; no new deficits    _________________________________________________  LABS:                        13.8   6.96  )-----------( 263      ( 26 Oct 2022 07:29 )             42.7     10-26    137  |  101  |  23<H>  ----------------------------<  110<H>  4.2   |  29  |  0.86    Ca    9.7      26 Oct 2022 07:29          CAPILLARY BLOOD GLUCOSE      POCT Blood Glucose.: 104 mg/dL (26 Oct 2022 07:49)  POCT Blood Glucose.: 101 mg/dL (25 Oct 2022 21:35)  POCT Blood Glucose.: 117 mg/dL (25 Oct 2022 16:37)  POCT Blood Glucose.: 116 mg/dL (25 Oct 2022 11:48)        RADIOLOGY & ADDITIONAL TESTS:    Imaging  Reviewed:    < from: IR Insert Picc Greater Than 5 Years (10.11.22 @ 12:00) >  Findings:  The left basilic vein is patent by US examination.  A   permanent recording of the vein being accessed was made using ultrasound.    A  4 F PICC was inserted through this vein and positioned at the   cavoatrial junction using both US and fluoroscopic guidance.    Impression: Placement of a 36 cm 4 Yi single-lumen CT injectable PICC   via the left basilic vein as described above.    The procedure was performed by MAYLIN Kerr.      Fluoroscopy Time:  2 Seconds    --- End of Report ---      < end of copied text >  < from: CT Abdomen and Pelvis w/ IV Cont (10.06.22 @ 17:44) >  IMPRESSION:  Infectious process in the right gluteal fold medially with infiltration   of subcutaneous fat and small air bubbles. There is no discrete drainable   collection  Incidental note is made of cholelithiasis and choledocholithiasis without   biliary ductal dilatation    --- End of Report ---    < end of copied text >      Consultant(s) Notes Reviewed:   YES      Plan of care was discussed with patient and /or primary care giver; all questions and concerns were addressed

## 2022-10-26 NOTE — PROGRESS NOTE ADULT - ASSESSMENT
Right buttock abscess - s/p I&D   MSSA bacteremia - repeat BC are negative     Plan:   ·	continue Ancef 1g iv q8h until 11/06  ·	patient is staying inhouse to complete treatment due to issue outpatient infusion cost

## 2022-10-26 NOTE — PROGRESS NOTE ADULT - ASSESSMENT
76 year old female from home with PMH of HTN, DM, Patient had an I/D on 10/1 in the ED and was discharged on keflex. Admitted to medicine for persistent abscess despite recent I&D and Keflex treatment.  Found to have MSSA Bacteremia. SX and ID Braxton followed, no acute sx intervention needed at this time. Started on IV Ancef, repeat CX negative. S/P PICC placement, VERONICA no evidence endocarditis. Patient unable to afford outpatient IV home antibiotics and no financial support at home, therefore remain inpatient for duration of IV antibiotic course until 11/6/22.

## 2022-10-26 NOTE — CHART NOTE - NSCHARTNOTEFT_GEN_A_CORE
Reassessment:   Nutrition note/follow up: Patient is a 76y old  Female who presents with a chief complaint of MSSA Bacteremia (26 Oct 2022 10:20). Chart reviewed pt visited, reports good PO intake, no N/V/D/C reported, no chewing or swallowing difficulties. Blood glucose noted WDL. pending d/c planing. RD remains available.       Factors impacting intake: [ ] none [ ] nausea  [ ] vomiting [ ] diarrhea [ ] constipation  [ ]chewing problems [ ] swallowing issues  [ ] other:     Diet Prescription: Diet, Consistent Carbohydrate w/Evening Snack (10-07-22 @ 09:13)    Intake:     Daily     Daily   % Weight Change    Pertinent Medications: MEDICATIONS  (STANDING):  ceFAZolin   IVPB 1000 milliGRAM(s) IV Intermittent every 8 hours  chlorhexidine 2% Cloths 1 Application(s) Topical <User Schedule>  dextrose 5%. 1000 milliLiter(s) (100 mL/Hr) IV Continuous <Continuous>  dextrose 5%. 1000 milliLiter(s) (50 mL/Hr) IV Continuous <Continuous>  dextrose 50% Injectable 25 Gram(s) IV Push once  dextrose 50% Injectable 12.5 Gram(s) IV Push once  dextrose 50% Injectable 25 Gram(s) IV Push once  enoxaparin Injectable 40 milliGRAM(s) SubCutaneous every 24 hours  gabapentin 100 milliGRAM(s) Oral two times a day  glucagon  Injectable 1 milliGRAM(s) IntraMuscular once  hydrochlorothiazide 25 milliGRAM(s) Oral daily  insulin lispro (ADMELOG) corrective regimen sliding scale   SubCutaneous three times a day before meals  insulin lispro (ADMELOG) corrective regimen sliding scale   SubCutaneous at bedtime  losartan 100 milliGRAM(s) Oral daily  montelukast 10 milliGRAM(s) Oral at bedtime  nystatin Powder 1 Application(s) Topical two times a day  pantoprazole    Tablet      pantoprazole    Tablet 40 milliGRAM(s) Oral before breakfast  propranolol  milliGRAM(s) Oral daily  sodium chloride 0.9%. 1000 milliLiter(s) (80 mL/Hr) IV Continuous <Continuous>    MEDICATIONS  (PRN):  acetaminophen     Tablet .. 650 milliGRAM(s) Oral every 6 hours PRN Temp greater or equal to 38C (100.4F), Mild Pain (1 - 3)  acetaminophen 300 mG/butalbital 50 mG/ caffeine 40 mG 1 Capsule(s) Oral every 6 hours PRN migraine headache  aluminum hydroxide/magnesium hydroxide/simethicone Suspension 30 milliLiter(s) Oral every 4 hours PRN Dyspepsia  dextrose Oral Gel 15 Gram(s) Oral once PRN Blood Glucose LESS THAN 70 milliGRAM(s)/deciliter  sodium chloride 0.9% lock flush 10 milliLiter(s) IV Push every 1 hour PRN Pre/post blood products, medications, blood draw, and to maintain line patency    Pertinent Labs: 10-26 Na137 mmol/L Glu 110 mg/dL<H> K+ 4.2 mmol/L Cr  0.86 mg/dL BUN 23 mg/dL<H>     CAPILLARY BLOOD GLUCOSE      POCT Blood Glucose.: 104 mg/dL (26 Oct 2022 07:49)  POCT Blood Glucose.: 101 mg/dL (25 Oct 2022 21:35)  POCT Blood Glucose.: 117 mg/dL (25 Oct 2022 16:37)  POCT Blood Glucose.: 116 mg/dL (25 Oct 2022 11:48)    Skin: Intact    Estimated Needs:   [x ] no change since previous assessment  [ ] recalculated:         Interventions: Nursing to continue with tray set up and encouragement  Recommend  [ ] Change Diet To:  [ ] Nutrition Supplement  [ ] Nutrition Support  [x ] Other: Continue with current diet rx. Encourage diet adherence.     Monitoring and Evaluation:   [ ] PO intake [ x ] Tolerance to diet prescription [ x ] weights [ x ] labs[ x ] follow up per protocol  [ ] other:

## 2022-10-26 NOTE — PROGRESS NOTE ADULT - SUBJECTIVE AND OBJECTIVE BOX
76y Female is under our care for right buttock abscess (s/p I&D) and MSSA bacteremia. Patient continues to do well and has no new complaints to present. Remains afebrile with normal wbc count.     MEDS:  ceFAZolin   IVPB 1000 milliGRAM(s) IV Intermittent every 8 hours    ALLERGIES: Allergies    codeine (Rash)    Intolerances    REVIEW OF SYSTEMS:  [  ] Not able to illicit  General: no fevers no malaise  Chest: no cough no sob  GI: no nvd  : no urinary sxs   Skin: no rashes  Musculoskeletal: no trauma no LBP  Neuro: no ha's no dizziness     VITALS:  Vital Signs Last 24 Hrs  T(C): 36.6 (10-26-22 @ 05:00), Max: 36.7 (10-25-22 @ 12:25)  T(F): 97.9 (10-26-22 @ 05:00), Max: 98.1 (10-25-22 @ 12:25)  HR: 56 (10-26-22 @ 05:00) (56 - 64)  BP: 123/82 (10-26-22 @ 05:00) (123/82 - 142/79)  BP(mean): --  RR: 18 (10-26-22 @ 05:00) (17 - 18)  SpO2: 95% (10-26-22 @ 05:00) (95% - 96%)    PHYSICAL EXAM:  HEENT: n/a  Neck: supple no LN's   Respiratory: lungs clear no rales  Cardiovascular: S1 S2 reg no murmurs  Gastrointestinal: +BS with soft, nondistended abdomen; nontender  Extremities: no edema, +left arm PICC line  Skin: no rashes  Ortho: n/a  Neuro: awake and alert    LABS/DIAGNOSTIC TESTS:                           13.8   6.96  )-----------( 263      ( 26 Oct 2022 07:29 )             42.7   WBC Count: 6.96 K/uL (10-26 @ 07:29)  WBC Count: 6.33 K/uL (10-24 @ 06:45)  WBC Count: 6.19 K/uL (10-22 @ 07:26)    10-26    137  |  101  |  23<H>  ----------------------------<  110<H>  4.2   |  29  |  0.86    Ca    9.7      26 Oct 2022 07:29        CULTURES:   .Blood Blood-Peripheral  10-09 @ 06:24   No Growth Final  --  --      .Blood Blood-Peripheral  10-09 @ 06:02   No Growth Final  --  --      .Blood Blood-Peripheral  10-06 @ 12:00   Growth in aerobic and anaerobic bottles: Staphylococcus aureus  See previous culture  [id number]  --  Blood Culture PCR  Staphylococcus aureus      .Abscess Leg - Right  10-01 @ 13:10   Numerous Staphylococcus aureus  --  Staphylococcus aureus      RADIOLOGY:  no new studies

## 2022-10-27 LAB
GLUCOSE BLDC GLUCOMTR-MCNC: 104 MG/DL — HIGH (ref 70–99)
GLUCOSE BLDC GLUCOMTR-MCNC: 117 MG/DL — HIGH (ref 70–99)
GLUCOSE BLDC GLUCOMTR-MCNC: 90 MG/DL — SIGNIFICANT CHANGE UP (ref 70–99)
GLUCOSE BLDC GLUCOMTR-MCNC: 94 MG/DL — SIGNIFICANT CHANGE UP (ref 70–99)

## 2022-10-27 RX ADMIN — Medication 100 MILLIGRAM(S): at 14:27

## 2022-10-27 RX ADMIN — CHLORHEXIDINE GLUCONATE 1 APPLICATION(S): 213 SOLUTION TOPICAL at 05:25

## 2022-10-27 RX ADMIN — NYSTATIN CREAM 1 APPLICATION(S): 100000 CREAM TOPICAL at 05:25

## 2022-10-27 RX ADMIN — GABAPENTIN 100 MILLIGRAM(S): 400 CAPSULE ORAL at 05:24

## 2022-10-27 RX ADMIN — Medication 160 MILLIGRAM(S): at 05:24

## 2022-10-27 RX ADMIN — Medication 100 MILLIGRAM(S): at 21:24

## 2022-10-27 RX ADMIN — GABAPENTIN 100 MILLIGRAM(S): 400 CAPSULE ORAL at 17:11

## 2022-10-27 RX ADMIN — Medication 25 MILLIGRAM(S): at 05:23

## 2022-10-27 RX ADMIN — MONTELUKAST 10 MILLIGRAM(S): 4 TABLET, CHEWABLE ORAL at 21:24

## 2022-10-27 RX ADMIN — ENOXAPARIN SODIUM 40 MILLIGRAM(S): 100 INJECTION SUBCUTANEOUS at 05:27

## 2022-10-27 RX ADMIN — Medication 1 CAPSULE(S): at 00:45

## 2022-10-27 RX ADMIN — Medication 100 MILLIGRAM(S): at 05:27

## 2022-10-27 RX ADMIN — LOSARTAN POTASSIUM 100 MILLIGRAM(S): 100 TABLET, FILM COATED ORAL at 05:24

## 2022-10-27 RX ADMIN — PANTOPRAZOLE SODIUM 40 MILLIGRAM(S): 20 TABLET, DELAYED RELEASE ORAL at 05:23

## 2022-10-27 NOTE — PROGRESS NOTE ADULT - SUBJECTIVE AND OBJECTIVE BOX
NP Note discussed with  Primary Attending    Patient is a 76y old  Female who presents with a chief complaint of MSSA Bacteremia (26 Oct 2022 10:20)      INTERVAL HPI/OVERNIGHT EVENTS: no new complaints    MEDICATIONS  (STANDING):  ceFAZolin   IVPB 1000 milliGRAM(s) IV Intermittent every 8 hours  chlorhexidine 2% Cloths 1 Application(s) Topical <User Schedule>  dextrose 5%. 1000 milliLiter(s) (100 mL/Hr) IV Continuous <Continuous>  dextrose 5%. 1000 milliLiter(s) (50 mL/Hr) IV Continuous <Continuous>  dextrose 50% Injectable 25 Gram(s) IV Push once  dextrose 50% Injectable 12.5 Gram(s) IV Push once  dextrose 50% Injectable 25 Gram(s) IV Push once  enoxaparin Injectable 40 milliGRAM(s) SubCutaneous every 24 hours  gabapentin 100 milliGRAM(s) Oral two times a day  glucagon  Injectable 1 milliGRAM(s) IntraMuscular once  hydrochlorothiazide 25 milliGRAM(s) Oral daily  insulin lispro (ADMELOG) corrective regimen sliding scale   SubCutaneous three times a day before meals  insulin lispro (ADMELOG) corrective regimen sliding scale   SubCutaneous at bedtime  losartan 100 milliGRAM(s) Oral daily  montelukast 10 milliGRAM(s) Oral at bedtime  nystatin Powder 1 Application(s) Topical two times a day  pantoprazole    Tablet      pantoprazole    Tablet 40 milliGRAM(s) Oral before breakfast  propranolol  milliGRAM(s) Oral daily  sodium chloride 0.9%. 1000 milliLiter(s) (80 mL/Hr) IV Continuous <Continuous>    MEDICATIONS  (PRN):  acetaminophen     Tablet .. 650 milliGRAM(s) Oral every 6 hours PRN Temp greater or equal to 38C (100.4F), Mild Pain (1 - 3)  acetaminophen 300 mG/butalbital 50 mG/ caffeine 40 mG 1 Capsule(s) Oral every 6 hours PRN migraine headache  aluminum hydroxide/magnesium hydroxide/simethicone Suspension 30 milliLiter(s) Oral every 4 hours PRN Dyspepsia  dextrose Oral Gel 15 Gram(s) Oral once PRN Blood Glucose LESS THAN 70 milliGRAM(s)/deciliter  sodium chloride 0.9% lock flush 10 milliLiter(s) IV Push every 1 hour PRN Pre/post blood products, medications, blood draw, and to maintain line patency      __________________________________________________  REVIEW OF SYSTEMS:    CONSTITUTIONAL: No fever,   EYES: no acute visual disturbances  NECK: No pain or stiffness  RESPIRATORY: No cough; No shortness of breath  CARDIOVASCULAR: No chest pain, no palpitations  GASTROINTESTINAL: No pain. No nausea or vomiting; No diarrhea   NEUROLOGICAL: No headache or numbness, no tremors  MUSCULOSKELETAL: No joint pain, no muscle pain  GENITOURINARY: no dysuria, no frequency, no hesitancy  PSYCHIATRY: no depression , no anxiety  ALL OTHER  ROS negative        Vital Signs Last 24 Hrs  T(C): 36.4 (27 Oct 2022 11:46), Max: 36.5 (26 Oct 2022 21:24)  T(F): 97.6 (27 Oct 2022 11:46), Max: 97.7 (26 Oct 2022 21:24)  HR: 51 (27 Oct 2022 11:46) (51 - 70)  BP: 146/86 (27 Oct 2022 11:46) (120/81 - 146/86)  BP(mean): --  RR: 18 (27 Oct 2022 11:46) (16 - 18)  SpO2: 96% (27 Oct 2022 11:46) (93% - 96%)    Parameters below as of 27 Oct 2022 11:46  Patient On (Oxygen Delivery Method): room air        ________________________________________________  PHYSICAL EXAM:  GENERAL: NAD  HEENT: Normocephalic;  conjunctivae and sclerae clear; moist mucous membranes;   NECK : supple  CHEST/LUNG: Clear to auscultation bilaterally with good air entry   HEART: S1 S2  regular; no murmurs, gallops or rubs  ABDOMEN: Soft, Nontender, Nondistended; Bowel sounds present  EXTREMITIES: no cyanosis; no edema; no calf tenderness  SKIN: warm and dry; no rash  NERVOUS SYSTEM:  Awake and alert; Oriented  to place, person and time ; no new deficits  _________________________________________________  LABS:                        13.8   6.96  )-----------( 263      ( 26 Oct 2022 07:29 )             42.7     10-26    137  |  101  |  23<H>  ----------------------------<  110<H>  4.2   |  29  |  0.86    Ca    9.7      26 Oct 2022 07:29          CAPILLARY BLOOD GLUCOSE      POCT Blood Glucose.: 117 mg/dL (27 Oct 2022 16:22)  POCT Blood Glucose.: 90 mg/dL (27 Oct 2022 11:17)  POCT Blood Glucose.: 94 mg/dL (27 Oct 2022 07:44)  POCT Blood Glucose.: 110 mg/dL (26 Oct 2022 21:36)  POCT Blood Glucose.: 99 mg/dL (26 Oct 2022 16:45)        RADIOLOGY & ADDITIONAL TESTS:  < from: CT Abdomen and Pelvis w/ IV Cont (10.06.22 @ 17:44) >  FINDINGS:  LOWER CHEST: Within normal limits.    LIVER: Within normal limits. The hepatic dome is not completely included   on the images  BILE DUCTS: Punctate calcification in the common bile duct at the level   of the pancreatic head on image 47 of series 2.  GALLBLADDER: Cholelithiasis.  SPLEEN: Within normal limits.  PANCREAS: Within normal limits.  ADRENALS: Within normal limits.  KIDNEYS/URETERS: Within normal limits.    BLADDER: Within normal limits.  REPRODUCTIVE ORGANS: Hysterectomy.    BOWEL: No bowel obstruction. Appendix not identified and may be   surgically absent. There is infiltration of the medial soft tissues of   the right buttock with small air bubbles seen. There is no well-defined   collection.  PERITONEUM: No ascites.  VESSELS: Atherosclerotic changes.  RETROPERITONEUM/LYMPH NODES: No lymphadenopathy.  ABDOMINAL WALL: Surgical clips in the right inguinal region.. Small   fat-containing umbilical hernia  BONES: Degenerative changes. There is a moderate levoscoliosis in the   lumbar spine. Grade 1 anterolisthesis of L4 on L5    IMPRESSION:  Infectious process in the right gluteal fold medially with infiltration   of subcutaneous fat and small air bubbles. There is no discrete drainable   collection  Incidental note is made of cholelithiasis and choledocholithiasis without   biliary ductal dilatation    --- End of Report ---    < end of copied text >  < from: Xray Ankle Complete 3 Views, Left (10.08.15 @ 18:50) >     FINDINGS:      There is no significant bony or articular abnormality. Soft tissue   swelling may be present.    IMPRESSION: No evidence of a fracture.    < end of copied text >  < from: Xray Ankle Complete 3 Views, Left (08.25.14 @ 18:53) >  FINDINGS:   There is a tiny ossific density adjacent to the tip of the medial   malleolus, likely fracture fragment, of indeterminate age. Clinical   correlation for focal site of tenderness is recommended to exclude acute   fracture. There are no other fractures or dislocations. The ankle mortise   is intact.There is no radiopaque retained foreign body.    IMPRESSION: As above.      < end of copied text >    Imaging  Reviewed:  YES    Consultant(s) Notes Reviewed:   YES      Plan of care was discussed with patient and /or primary care giver; all questions and concerns were addressed

## 2022-10-28 LAB
ANION GAP SERPL CALC-SCNC: 11 MMOL/L — SIGNIFICANT CHANGE UP (ref 5–17)
BUN SERPL-MCNC: 20 MG/DL — HIGH (ref 7–18)
CALCIUM SERPL-MCNC: 9.4 MG/DL — SIGNIFICANT CHANGE UP (ref 8.4–10.5)
CHLORIDE SERPL-SCNC: 100 MMOL/L — SIGNIFICANT CHANGE UP (ref 96–108)
CO2 SERPL-SCNC: 26 MMOL/L — SIGNIFICANT CHANGE UP (ref 22–31)
CREAT SERPL-MCNC: 0.91 MG/DL — SIGNIFICANT CHANGE UP (ref 0.5–1.3)
EGFR: 65 ML/MIN/1.73M2 — SIGNIFICANT CHANGE UP
GLUCOSE BLDC GLUCOMTR-MCNC: 104 MG/DL — HIGH (ref 70–99)
GLUCOSE BLDC GLUCOMTR-MCNC: 119 MG/DL — HIGH (ref 70–99)
GLUCOSE BLDC GLUCOMTR-MCNC: 122 MG/DL — HIGH (ref 70–99)
GLUCOSE BLDC GLUCOMTR-MCNC: 87 MG/DL — SIGNIFICANT CHANGE UP (ref 70–99)
GLUCOSE SERPL-MCNC: 112 MG/DL — HIGH (ref 70–99)
HCT VFR BLD CALC: 37.8 % — SIGNIFICANT CHANGE UP (ref 34.5–45)
HGB BLD-MCNC: 12.4 G/DL — SIGNIFICANT CHANGE UP (ref 11.5–15.5)
MCHC RBC-ENTMCNC: 31.5 PG — SIGNIFICANT CHANGE UP (ref 27–34)
MCHC RBC-ENTMCNC: 32.8 GM/DL — SIGNIFICANT CHANGE UP (ref 32–36)
MCV RBC AUTO: 95.9 FL — SIGNIFICANT CHANGE UP (ref 80–100)
NRBC # BLD: 0 /100 WBCS — SIGNIFICANT CHANGE UP (ref 0–0)
PLATELET # BLD AUTO: 221 K/UL — SIGNIFICANT CHANGE UP (ref 150–400)
POTASSIUM SERPL-MCNC: 3.8 MMOL/L — SIGNIFICANT CHANGE UP (ref 3.5–5.3)
POTASSIUM SERPL-SCNC: 3.8 MMOL/L — SIGNIFICANT CHANGE UP (ref 3.5–5.3)
RBC # BLD: 3.94 M/UL — SIGNIFICANT CHANGE UP (ref 3.8–5.2)
RBC # FLD: 13.7 % — SIGNIFICANT CHANGE UP (ref 10.3–14.5)
SARS-COV-2 RNA SPEC QL NAA+PROBE: SIGNIFICANT CHANGE UP
SODIUM SERPL-SCNC: 137 MMOL/L — SIGNIFICANT CHANGE UP (ref 135–145)
WBC # BLD: 6.03 K/UL — SIGNIFICANT CHANGE UP (ref 3.8–10.5)
WBC # FLD AUTO: 6.03 K/UL — SIGNIFICANT CHANGE UP (ref 3.8–10.5)

## 2022-10-28 RX ADMIN — ENOXAPARIN SODIUM 40 MILLIGRAM(S): 100 INJECTION SUBCUTANEOUS at 06:38

## 2022-10-28 RX ADMIN — Medication 100 MILLIGRAM(S): at 13:24

## 2022-10-28 RX ADMIN — Medication 650 MILLIGRAM(S): at 23:30

## 2022-10-28 RX ADMIN — LOSARTAN POTASSIUM 100 MILLIGRAM(S): 100 TABLET, FILM COATED ORAL at 05:25

## 2022-10-28 RX ADMIN — PANTOPRAZOLE SODIUM 40 MILLIGRAM(S): 20 TABLET, DELAYED RELEASE ORAL at 06:40

## 2022-10-28 RX ADMIN — NYSTATIN CREAM 1 APPLICATION(S): 100000 CREAM TOPICAL at 05:25

## 2022-10-28 RX ADMIN — CHLORHEXIDINE GLUCONATE 1 APPLICATION(S): 213 SOLUTION TOPICAL at 05:24

## 2022-10-28 RX ADMIN — Medication 650 MILLIGRAM(S): at 22:18

## 2022-10-28 RX ADMIN — GABAPENTIN 100 MILLIGRAM(S): 400 CAPSULE ORAL at 05:24

## 2022-10-28 RX ADMIN — GABAPENTIN 100 MILLIGRAM(S): 400 CAPSULE ORAL at 17:16

## 2022-10-28 RX ADMIN — Medication 100 MILLIGRAM(S): at 21:48

## 2022-10-28 RX ADMIN — Medication 100 MILLIGRAM(S): at 05:24

## 2022-10-28 RX ADMIN — MONTELUKAST 10 MILLIGRAM(S): 4 TABLET, CHEWABLE ORAL at 21:48

## 2022-10-28 NOTE — PROGRESS NOTE ADULT - SUBJECTIVE AND OBJECTIVE BOX
INTERVAL HPI/OVERNIGHT EVENTS:  Patient seen,events noticed,stable  VITAL SIGNS:  T(F): 97.8 (10-28-22 @ 05:20)  HR: 59 (10-28-22 @ 05:20)  BP: 116/80 (10-28-22 @ 05:20)  RR: 18 (10-28-22 @ 05:20)  SpO2: 93% (10-28-22 @ 05:20)  Wt(kg): --    PHYSICAL EXAM:  Awake  Constitutional:  Eyes:  ENMT:perrla  Neck:  Respiratory:clear  Cardiovascular:s1s2,m-none  Gastrointestinal:soft,bs pois  Extremities:  Vascular:  Neurological:no focal deficit  Musculoskeletal:    MEDICATIONS  (STANDING):  ceFAZolin   IVPB 1000 milliGRAM(s) IV Intermittent every 8 hours  chlorhexidine 2% Cloths 1 Application(s) Topical <User Schedule>  dextrose 5%. 1000 milliLiter(s) (100 mL/Hr) IV Continuous <Continuous>  dextrose 5%. 1000 milliLiter(s) (50 mL/Hr) IV Continuous <Continuous>  dextrose 50% Injectable 25 Gram(s) IV Push once  dextrose 50% Injectable 12.5 Gram(s) IV Push once  dextrose 50% Injectable 25 Gram(s) IV Push once  enoxaparin Injectable 40 milliGRAM(s) SubCutaneous every 24 hours  gabapentin 100 milliGRAM(s) Oral two times a day  glucagon  Injectable 1 milliGRAM(s) IntraMuscular once  hydrochlorothiazide 25 milliGRAM(s) Oral daily  insulin lispro (ADMELOG) corrective regimen sliding scale   SubCutaneous three times a day before meals  insulin lispro (ADMELOG) corrective regimen sliding scale   SubCutaneous at bedtime  losartan 100 milliGRAM(s) Oral daily  montelukast 10 milliGRAM(s) Oral at bedtime  nystatin Powder 1 Application(s) Topical two times a day  pantoprazole    Tablet      pantoprazole    Tablet 40 milliGRAM(s) Oral before breakfast  propranolol  milliGRAM(s) Oral daily  sodium chloride 0.9%. 1000 milliLiter(s) (80 mL/Hr) IV Continuous <Continuous>    MEDICATIONS  (PRN):  acetaminophen     Tablet .. 650 milliGRAM(s) Oral every 6 hours PRN Temp greater or equal to 38C (100.4F), Mild Pain (1 - 3)  acetaminophen 300 mG/butalbital 50 mG/ caffeine 40 mG 1 Capsule(s) Oral every 6 hours PRN migraine headache  aluminum hydroxide/magnesium hydroxide/simethicone Suspension 30 milliLiter(s) Oral every 4 hours PRN Dyspepsia  dextrose Oral Gel 15 Gram(s) Oral once PRN Blood Glucose LESS THAN 70 milliGRAM(s)/deciliter  sodium chloride 0.9% lock flush 10 milliLiter(s) IV Push every 1 hour PRN Pre/post blood products, medications, blood draw, and to maintain line patency      Allergies    codeine (Rash)    Intolerances        LABS:                        12.4   6.03  )-----------( 221      ( 28 Oct 2022 06:15 )             37.8     10-28    137  |  100  |  20<H>  ----------------------------<  112<H>  3.8   |  26  |  0.91    Ca    9.4      28 Oct 2022 06:15            RADIOLOGY & ADDITIONAL TESTS:      · Assessment	  76 year old female from home with PMH of HTN, DM, Patient had an I/D on 10/1 in the ED and was discharged on keflex. Presents with worsening purulent discharge from abscess, later found to + MSSA Bacteremia.SX and ID Braxton followed, no acute sx intervention needed at this time. Started on IV Ancef, repeat CX negative. S/P PICC placement, VERONICA no evidence endocarditis. Patient unable to afford outpatient IV home antibiotics and no financial support at home. Will remain inpatient for duration of IV antibiotic course until 11/6/22     Nutritional Assessment:  · Nutritional Assessment	Diet, Consistent Carbohydrate w/Evening Snack (10-07-22 @ 09:13) [Active]     Problem/Plan - 1:  ·  Problem: MSSA bacteremia.   ·  Plan: -  MSSA bacteremia secondary perineal Abscess  -  S/P I&D 10/1: culture positive staph aureus  -  Continue Ancef 1g iv q8  -  repeat CX negative   -  S/P TTE with no obvious vegetations noted  -  VERONICA no endocarditis noted  -  s/p PICC placement for 4 weeks of IV antibiotics, until 11/6  -  ID Dr. Limon.     Problem/Plan - 2:  ·  Problem: Perineal abscess.   ·  Plan: -  s/p I/D on 10/1   -  Continue daily packing   -  Continue antibiotics per ID: Ancef 1gm IV q8h until 11/6  -  ID Dr. Limon following.     Problem/Plan - 3:  ·  Problem: HTN (hypertension).   ·  Plan: -  controlled   -  Continue with  HCTZ  -  Continue with Losartan  -  Continue with Propranolol with parameters   - monitor blood pressures.     Problem/Plan - 4:  ·  Problem: Diabetes.   ·  Plan: -  HgbA1C 6.4 on admission  -  Monitor FS AC and HS  -  Sliding scale coverage as ordered  -  Consistent CHO diet.     Problem/Plan - 5:  ·  Problem: DVT prophylaxis.   ·  Plan: -  DVT prophylaxis with Lovenox  -  GI prophylaxis with Protonix.     Problem/Plan - 6:  ·  Problem: Discharge planning issues.   ·  Plan: -  Plan for discharge on  11/6/22  -  Patient needs IV antibiotics until 11/6/22 and cannot afford services at home.

## 2022-10-28 NOTE — PROGRESS NOTE ADULT - PROBLEM SELECTOR PLAN 4
Controlled  Continue sliding scale insulin coverage  Continue glucose monitoring  Continue low carb diet

## 2022-10-28 NOTE — PROGRESS NOTE ADULT - PROBLEM SELECTOR PLAN 1
Secondary perineal Abscess  S/P I&D 10/1: culture positive staph aureus  Continue Ancef 1g iv q8 until 11/6  Repeat CX negative   VERONICA neg for endocarditis  PICC placed for ABX  TOMMY Vernon, following  Recommendations appreciated

## 2022-10-28 NOTE — PROGRESS NOTE ADULT - SUBJECTIVE AND OBJECTIVE BOX
HPI:  76 year old with PMH of HTN, DM is here for worsening of perineal abscess.  Patient presented with perineal abscess and had an I/D on 10/1 in the ED and was discharged on keflex.  Patient states that she had worsening redness and  purulent discharge from abscess site. Patient also endorses subjective fevers, but denies any chills, nausea vomiting chest pain, SOB, abdominal pain, or change in bowel habit.     In the ED:  Afebrile, hemodynamically stable  No leukocytosis  lactate 2.5  CT A/P showed right gluteal fold  with infiltration w/o  discrete drainable collection      (06 Oct 2022 19:54)      OVERNIGHT EVENTS:    No new overnight events.  Seen and examined at bedside.     REVIEW OF SYSTEMS:      CONSTITUTIONAL: No fever  EYES: no acute visual disturbances  NECK: No pain or stiffness  RESPIRATORY: No cough; No shortness of breath  CARDIOVASCULAR: No chest pain, no palpitations  GASTROINTESTINAL: No pain. No nausea, vomiting or diarrhea   NEUROLOGICAL: No headache or numbness, no tremors  MUSCULOSKELETAL: No joint pain, no muscle pain  GENITOURINARY: no dysuria, no frequency, no hesitancy  PSYCHIATRY: no depression, no anxiety  ALL OTHER  ROS negative        Vital Signs Last 24 Hrs  T(C): 36.7 (28 Oct 2022 12:19), Max: 36.7 (28 Oct 2022 12:19)  T(F): 98.1 (28 Oct 2022 12:19), Max: 98.1 (28 Oct 2022 12:19)  HR: 51 (28 Oct 2022 12:19) (51 - 59)  BP: 167/99 (28 Oct 2022 12:19) (116/80 - 167/99)  BP(mean): --  RR: 17 (28 Oct 2022 12:19) (17 - 18)  SpO2: 97% (28 Oct 2022 12:19) (93% - 97%)    Parameters below as of 28 Oct 2022 12:19  Patient On (Oxygen Delivery Method): room air        ________________________________________________  PHYSICAL EXAM:    GENERAL: NAD  HEENT: Normocephalic; conjunctivae and sclerae clear;  NECK : supple, no JVD  CHEST/LUNG: Clear to auscultation; Nonlabored  HEART: S1 S2  regular  ABDOMEN: Soft, Nontender, Nondistended; Bowel sounds present  EXTREMITIES: no cyanosis; no LE edema; no calf tenderness  NERVOUS SYSTEM:  Alert; no new deficits  SKIN: warm and dry; No rashes or lesions    _________________________________________________  CURRENT MEDICATIONS:    MEDICATIONS  (STANDING):  ceFAZolin   IVPB 1000 milliGRAM(s) IV Intermittent every 8 hours  chlorhexidine 2% Cloths 1 Application(s) Topical <User Schedule>  dextrose 5%. 1000 milliLiter(s) (100 mL/Hr) IV Continuous <Continuous>  dextrose 5%. 1000 milliLiter(s) (50 mL/Hr) IV Continuous <Continuous>  dextrose 50% Injectable 25 Gram(s) IV Push once  dextrose 50% Injectable 12.5 Gram(s) IV Push once  dextrose 50% Injectable 25 Gram(s) IV Push once  enoxaparin Injectable 40 milliGRAM(s) SubCutaneous every 24 hours  gabapentin 100 milliGRAM(s) Oral two times a day  glucagon  Injectable 1 milliGRAM(s) IntraMuscular once  hydrochlorothiazide 25 milliGRAM(s) Oral daily  insulin lispro (ADMELOG) corrective regimen sliding scale   SubCutaneous three times a day before meals  insulin lispro (ADMELOG) corrective regimen sliding scale   SubCutaneous at bedtime  losartan 100 milliGRAM(s) Oral daily  montelukast 10 milliGRAM(s) Oral at bedtime  nystatin Powder 1 Application(s) Topical two times a day  pantoprazole    Tablet      pantoprazole    Tablet 40 milliGRAM(s) Oral before breakfast  propranolol  milliGRAM(s) Oral daily  sodium chloride 0.9%. 1000 milliLiter(s) (80 mL/Hr) IV Continuous <Continuous>    MEDICATIONS  (PRN):  acetaminophen     Tablet .. 650 milliGRAM(s) Oral every 6 hours PRN Temp greater or equal to 38C (100.4F), Mild Pain (1 - 3)  acetaminophen 300 mG/butalbital 50 mG/ caffeine 40 mG 1 Capsule(s) Oral every 6 hours PRN migraine headache  aluminum hydroxide/magnesium hydroxide/simethicone Suspension 30 milliLiter(s) Oral every 4 hours PRN Dyspepsia  dextrose Oral Gel 15 Gram(s) Oral once PRN Blood Glucose LESS THAN 70 milliGRAM(s)/deciliter  sodium chloride 0.9% lock flush 10 milliLiter(s) IV Push every 1 hour PRN Pre/post blood products, medications, blood draw, and to maintain line patency      __________________________________________________  LABS:                          12.4   6.03  )-----------( 221      ( 28 Oct 2022 06:15 )             37.8     10-28    137  |  100  |  20<H>  ----------------------------<  112<H>  3.8   |  26  |  0.91    Ca    9.4      28 Oct 2022 06:15          CAPILLARY BLOOD GLUCOSE      POCT Blood Glucose.: 87 mg/dL (28 Oct 2022 11:29)  POCT Blood Glucose.: 122 mg/dL (28 Oct 2022 08:05)  POCT Blood Glucose.: 104 mg/dL (27 Oct 2022 21:29)  POCT Blood Glucose.: 117 mg/dL (27 Oct 2022 16:22)      __________________________________________________  RADIOLOGY & ADDITIONAL TESTS:    Imaging Personally Reviewed:  YES    < from: CT Abdomen and Pelvis w/ IV Cont (10.06.22 @ 17:44) >  IMPRESSION:  Infectious process in the right gluteal fold medially with infiltration   of subcutaneous fat and small air bubbles. There is no discrete drainable   collection  Incidental note is made of cholelithiasis and choledocholithiasis without   biliary ductal dilatation    < end of copied text >    Consultant(s) Notes Reviewed:   YES     Plan of care was discussed with patient and /or primary care giver; all questions and concerns were addressed and care was aligned with patient's wishes.    Plan discussed with attending and consulting physicians.

## 2022-10-28 NOTE — PROGRESS NOTE ADULT - PROBLEM SELECTOR PLAN 2
S/p I/D on 10/1   CT A/P noted above  Surg recs no surgical intervention   Continue daily packing   Continue antibiotics   Dr. Limon, ID, following  Recommendations appreciated

## 2022-10-28 NOTE — PROGRESS NOTE ADULT - PROBLEM SELECTOR PLAN 6
Pt needs IV antibiotics until 11/6/22 and cannot afford services at home.  Pt will be stable for d/c s/p 11/6 ABX dose  Care management following for discharge planning

## 2022-10-29 LAB
GLUCOSE BLDC GLUCOMTR-MCNC: 103 MG/DL — HIGH (ref 70–99)
GLUCOSE BLDC GLUCOMTR-MCNC: 109 MG/DL — HIGH (ref 70–99)
GLUCOSE BLDC GLUCOMTR-MCNC: 115 MG/DL — HIGH (ref 70–99)
GLUCOSE BLDC GLUCOMTR-MCNC: 70 MG/DL — SIGNIFICANT CHANGE UP (ref 70–99)

## 2022-10-29 RX ADMIN — PANTOPRAZOLE SODIUM 40 MILLIGRAM(S): 20 TABLET, DELAYED RELEASE ORAL at 06:30

## 2022-10-29 RX ADMIN — MONTELUKAST 10 MILLIGRAM(S): 4 TABLET, CHEWABLE ORAL at 21:16

## 2022-10-29 RX ADMIN — Medication 1 CAPSULE(S): at 18:23

## 2022-10-29 RX ADMIN — Medication 25 MILLIGRAM(S): at 05:18

## 2022-10-29 RX ADMIN — GABAPENTIN 100 MILLIGRAM(S): 400 CAPSULE ORAL at 05:17

## 2022-10-29 RX ADMIN — CHLORHEXIDINE GLUCONATE 1 APPLICATION(S): 213 SOLUTION TOPICAL at 05:17

## 2022-10-29 RX ADMIN — Medication 160 MILLIGRAM(S): at 05:18

## 2022-10-29 RX ADMIN — GABAPENTIN 100 MILLIGRAM(S): 400 CAPSULE ORAL at 18:10

## 2022-10-29 RX ADMIN — Medication 100 MILLIGRAM(S): at 21:16

## 2022-10-29 RX ADMIN — Medication 1 CAPSULE(S): at 19:30

## 2022-10-29 RX ADMIN — Medication 100 MILLIGRAM(S): at 14:25

## 2022-10-29 RX ADMIN — ENOXAPARIN SODIUM 40 MILLIGRAM(S): 100 INJECTION SUBCUTANEOUS at 06:28

## 2022-10-29 RX ADMIN — LOSARTAN POTASSIUM 100 MILLIGRAM(S): 100 TABLET, FILM COATED ORAL at 05:17

## 2022-10-29 RX ADMIN — Medication 100 MILLIGRAM(S): at 05:19

## 2022-10-29 NOTE — PROGRESS NOTE ADULT - ASSESSMENT
Right buttock abscess - s/p I&D   MSSA bacteremia - repeat BC are negative     Plan:   ·	continue Ancef 1g iv q8h until 11/06  ·	patient is staying inhouse to complete treatment

## 2022-10-29 NOTE — PROGRESS NOTE ADULT - SUBJECTIVE AND OBJECTIVE BOX
INTERVAL HPI/OVERNIGHT EVENTS:  Patient seen,stable ,no acute issues  VITAL SIGNS:  T(F): 98.1 (10-29-22 @ 05:47)  HR: 66 (10-29-22 @ 05:47)  BP: 152/83 (10-29-22 @ 05:47)  RR: 18 (10-29-22 @ 05:47)  SpO2: 95% (10-29-22 @ 05:47)  Wt(kg): --    PHYSICAL EXAM:  awake,alert  Constitutional:  Eyes:  ENMT:perrla  Neck:  Respiratory:clear  Cardiovascular:s1s2,m-none  Gastrointestinal:soft,bs pos  Extremities:  Vascular:  Neurological:no focal deficit  Musculoskeletal:    MEDICATIONS  (STANDING):  ceFAZolin   IVPB 1000 milliGRAM(s) IV Intermittent every 8 hours  chlorhexidine 2% Cloths 1 Application(s) Topical <User Schedule>  dextrose 5%. 1000 milliLiter(s) (100 mL/Hr) IV Continuous <Continuous>  dextrose 5%. 1000 milliLiter(s) (50 mL/Hr) IV Continuous <Continuous>  dextrose 50% Injectable 25 Gram(s) IV Push once  dextrose 50% Injectable 12.5 Gram(s) IV Push once  dextrose 50% Injectable 25 Gram(s) IV Push once  enoxaparin Injectable 40 milliGRAM(s) SubCutaneous every 24 hours  gabapentin 100 milliGRAM(s) Oral two times a day  glucagon  Injectable 1 milliGRAM(s) IntraMuscular once  hydrochlorothiazide 25 milliGRAM(s) Oral daily  insulin lispro (ADMELOG) corrective regimen sliding scale   SubCutaneous three times a day before meals  insulin lispro (ADMELOG) corrective regimen sliding scale   SubCutaneous at bedtime  losartan 100 milliGRAM(s) Oral daily  montelukast 10 milliGRAM(s) Oral at bedtime  pantoprazole    Tablet      pantoprazole    Tablet 40 milliGRAM(s) Oral before breakfast  propranolol  milliGRAM(s) Oral daily  sodium chloride 0.9%. 1000 milliLiter(s) (80 mL/Hr) IV Continuous <Continuous>    MEDICATIONS  (PRN):  acetaminophen     Tablet .. 650 milliGRAM(s) Oral every 6 hours PRN Temp greater or equal to 38C (100.4F), Mild Pain (1 - 3)  acetaminophen 300 mG/butalbital 50 mG/ caffeine 40 mG 1 Capsule(s) Oral every 6 hours PRN migraine headache  aluminum hydroxide/magnesium hydroxide/simethicone Suspension 30 milliLiter(s) Oral every 4 hours PRN Dyspepsia  dextrose Oral Gel 15 Gram(s) Oral once PRN Blood Glucose LESS THAN 70 milliGRAM(s)/deciliter  sodium chloride 0.9% lock flush 10 milliLiter(s) IV Push every 1 hour PRN Pre/post blood products, medications, blood draw, and to maintain line patency      Allergies    codeine (Rash)    Intolerances        LABS:                        12.4   6.03  )-----------( 221      ( 28 Oct 2022 06:15 )             37.8     10-28    137  |  100  |  20<H>  ----------------------------<  112<H>  3.8   |  26  |  0.91    Ca    9.4      28 Oct 2022 06:15            RADIOLOGY & ADDITIONAL TESTS:      · Assessment	  76 year old female from home with PMH of HTN, DM, Patient had an I/D on 10/1 in the ED and was discharged on keflex. Presents with worsening purulent discharge from abscess, later found to + MSSA Bacteremia.SX and ID Braxton followed, no acute sx intervention needed at this time. Started on IV Ancef, repeat CX negative. S/P PICC placement, VERONICA no evidence endocarditis. Patient unable to afford outpatient IV home antibiotics and no financial support at home. Will remain inpatient for duration of IV antibiotic course until 11/6/22     Nutritional Assessment:  · Nutritional Assessment	Diet, Consistent Carbohydrate w/Evening Snack (10-07-22 @ 09:13) [Active]     Problem/Plan - 1:  ·  Problem: MSSA bacteremia.   ·  Plan: -  MSSA bacteremia secondary perineal Abscess  -  S/P I&D 10/1: culture positive staph aureus  -  Continue Ancef 1g iv q8  -  repeat CX negative   -  S/P TTE with no obvious vegetations noted  -  VERONICA no endocarditis noted  -  s/p PICC placement for 4 weeks of IV antibiotics, until 11/6  -  ID Dr. Limon.     Problem/Plan - 2:  ·  Problem: Perineal abscess.   ·  Plan: -  s/p I/D on 10/1   -  Continue daily packing   -  Continue antibiotics per ID: Ancef 1gm IV q8h until 11/6  -  ID Dr. Limon following.     Problem/Plan - 3:  ·  Problem: HTN (hypertension).   ·  Plan: -  controlled   -  Continue with  HCTZ  -  Continue with Losartan  -  Continue with Propranolol with parameters   - monitor blood pressures.     Problem/Plan - 4:  ·  Problem: Diabetes.   ·  Plan: -  HgbA1C 6.4 on admission  -  Monitor FS AC and HS  -  Sliding scale coverage as ordered  -  Consistent CHO diet.     Problem/Plan - 5:  ·  Problem: DVT prophylaxis.   ·  Plan: -  DVT prophylaxis with Lovenox  -  GI prophylaxis with Protonix.     Problem/Plan - 6:  ·  Problem: Discharge planning issues.   ·  Plan: -  Plan for discharge on  11/6/22  -  Patient needs IV antibiotics until 11/6/22 and cannot afford services at home.

## 2022-10-29 NOTE — PROGRESS NOTE ADULT - SUBJECTIVE AND OBJECTIVE BOX
76y Female is under our care for right buttock abscess (s/p I&D) and MSSA bacteremia. Since I last saw the patient, patient was apparently exposed to covid and placed on droplet precaution. She is doing well and has no new complaints. Remains afebrile with normal wbc count.     MEDS:  ceFAZolin   IVPB 1000 milliGRAM(s) IV Intermittent every 8 hours    ALLERGIES: Allergies    codeine (Rash)    Intolerances    REVIEW OF SYSTEMS:  [  ] Not able to illicit  General: no fevers no malaise  Chest: no cough no sob  GI: no nvd  : no urinary sxs   Skin: no rashes  Musculoskeletal: no trauma no LBP  Neuro: no ha's no dizziness     VITALS:  Vital Signs Last 24 Hrs  T(C): 36.4 (10-29-22 @ 12:08), Max: 36.7 (10-28-22 @ 21:14)  T(F): 97.5 (10-29-22 @ 12:08), Max: 98.1 (10-29-22 @ 05:47)  HR: 58 (10-29-22 @ 12:08) (58 - 66)  BP: 136/86 (10-29-22 @ 12:08) (136/86 - 152/83)  BP(mean): --  RR: 17 (10-29-22 @ 12:08) (17 - 18)  SpO2: 96% (10-29-22 @ 12:08) (95% - 96%)    PHYSICAL EXAM:  HEENT: n/a  Neck: supple no LN's   Respiratory: lungs clear no rales  Cardiovascular: S1 S2 reg no murmurs  Gastrointestinal: +BS with soft, nondistended abdomen; nontender  Extremities: no edema, +left arm PICC line  Skin: no rashes  Ortho: n/a  Neuro: awake and alert    LABS/DIAGNOSTIC TESTS:    No new labs                          12.4   6.03  )-----------( 221      ( 28 Oct 2022 06:15 )             37.8   WBC Count: 6.03 K/uL (10-28 @ 06:15)  WBC Count: 6.96 K/uL (10-26 @ 07:29)    10-28    137  |  100  |  20<H>  ----------------------------<  112<H>  3.8   |  26  |  0.91    Ca    9.4      28 Oct 2022 06:15        CULTURES:   .Blood Blood-Peripheral  10-09 @ 06:24   No Growth Final  --  --      .Blood Blood-Peripheral  10-09 @ 06:02   No Growth Final  --  --      .Blood Blood-Peripheral  10-06 @ 12:00   Growth in aerobic and anaerobic bottles: Staphylococcus aureus  See previous culture  [id number]  --  Blood Culture PCR  Staphylococcus aureus      .Abscess Leg - Right  10-01 @ 13:10   Numerous Staphylococcus aureus  --  Staphylococcus aureus      RADIOLOGY:  no new studies

## 2022-10-30 LAB
GLUCOSE BLDC GLUCOMTR-MCNC: 104 MG/DL — HIGH (ref 70–99)
GLUCOSE BLDC GLUCOMTR-MCNC: 108 MG/DL — HIGH (ref 70–99)
GLUCOSE BLDC GLUCOMTR-MCNC: 110 MG/DL — HIGH (ref 70–99)
GLUCOSE BLDC GLUCOMTR-MCNC: 98 MG/DL — SIGNIFICANT CHANGE UP (ref 70–99)

## 2022-10-30 RX ORDER — IPRATROPIUM/ALBUTEROL SULFATE 18-103MCG
3 AEROSOL WITH ADAPTER (GRAM) INHALATION ONCE
Refills: 0 | Status: COMPLETED | OUTPATIENT
Start: 2022-10-30 | End: 2022-10-30

## 2022-10-30 RX ADMIN — LOSARTAN POTASSIUM 100 MILLIGRAM(S): 100 TABLET, FILM COATED ORAL at 06:04

## 2022-10-30 RX ADMIN — MONTELUKAST 10 MILLIGRAM(S): 4 TABLET, CHEWABLE ORAL at 21:41

## 2022-10-30 RX ADMIN — GABAPENTIN 100 MILLIGRAM(S): 400 CAPSULE ORAL at 05:13

## 2022-10-30 RX ADMIN — Medication 3 MILLILITER(S): at 05:31

## 2022-10-30 RX ADMIN — GABAPENTIN 100 MILLIGRAM(S): 400 CAPSULE ORAL at 18:00

## 2022-10-30 RX ADMIN — Medication 100 MILLIGRAM(S): at 16:35

## 2022-10-30 RX ADMIN — Medication 100 MILLIGRAM(S): at 21:41

## 2022-10-30 RX ADMIN — Medication 100 MILLIGRAM(S): at 05:12

## 2022-10-30 RX ADMIN — ENOXAPARIN SODIUM 40 MILLIGRAM(S): 100 INJECTION SUBCUTANEOUS at 06:05

## 2022-10-30 RX ADMIN — PANTOPRAZOLE SODIUM 40 MILLIGRAM(S): 20 TABLET, DELAYED RELEASE ORAL at 06:04

## 2022-10-30 RX ADMIN — Medication 160 MILLIGRAM(S): at 06:04

## 2022-10-30 RX ADMIN — CHLORHEXIDINE GLUCONATE 1 APPLICATION(S): 213 SOLUTION TOPICAL at 05:12

## 2022-10-30 RX ADMIN — Medication 25 MILLIGRAM(S): at 06:05

## 2022-10-30 NOTE — PROGRESS NOTE ADULT - SUBJECTIVE AND OBJECTIVE BOX
76y Female is under our care for right buttock abscess (s/p I&D) and MSSA bacteremia. Patient is resting comfortably on bed and has no new complaints. Remains afebrile with normal wbc count.     MEDS:  ceFAZolin   IVPB 1000 milliGRAM(s) IV Intermittent every 8 hours    ALLERGIES: Allergies    codeine (Rash)    Intolerances    REVIEW OF SYSTEMS:  [  ] Not able to illicit  General: no fevers no malaise  Chest: no cough no sob  GI: no nvd  : no urinary sxs   Skin: no rashes  Musculoskeletal: no trauma no LBP  Neuro: no ha's no dizziness     VITALS:  Vital Signs Last 24 Hrs  T(C): 36.3 (10-30-22 @ 05:00), Max: 36.6 (10-29-22 @ 21:13)  T(F): 97.3 (10-30-22 @ 05:00), Max: 97.8 (10-29-22 @ 21:13)  HR: 57 (10-30-22 @ 05:00) (56 - 58)  BP: 172/84 (10-30-22 @ 05:00) (136/86 - 172/84)  BP(mean): --  RR: 18 (10-30-22 @ 05:00) (16 - 18)  SpO2: 96% (10-30-22 @ 05:00) (95% - 96%)    PHYSICAL EXAM:  HEENT: n/a  Neck: supple no LN's   Respiratory: lungs clear no rales  Cardiovascular: S1 S2 reg no murmurs  Gastrointestinal: +BS with soft, nondistended abdomen; nontender  Extremities: no edema, +left arm PICC line  Skin: no rashes  Ortho: n/a  Neuro: awake and alert    LABS/DIAGNOSTIC TESTS:    No new labs                          12.4   6.03  )-----------( 221      ( 28 Oct 2022 06:15 )             37.8   WBC Count: 6.03 K/uL (10-28 @ 06:15)  WBC Count: 6.96 K/uL (10-26 @ 07:29)    10-28    137  |  100  |  20<H>  ----------------------------<  112<H>  3.8   |  26  |  0.91    Ca    9.4      28 Oct 2022 06:15        CULTURES:   .Blood Blood-Peripheral  10-09 @ 06:24   No Growth Final  --  --      .Blood Blood-Peripheral  10-09 @ 06:02   No Growth Final  --  --      .Blood Blood-Peripheral  10-06 @ 12:00   Growth in aerobic and anaerobic bottles: Staphylococcus aureus  See previous culture  [id number]  --  Blood Culture PCR  Staphylococcus aureus      .Abscess Leg - Right  10-01 @ 13:10   Numerous Staphylococcus aureus  --  Staphylococcus aureus      RADIOLOGY:  no new studies

## 2022-10-30 NOTE — PROGRESS NOTE ADULT - SUBJECTIVE AND OBJECTIVE BOX
INTERVAL HPI/OVERNIGHT EVENTS:  Patietn seen,events noticed for overnight  VITAL SIGNS:  T(F): 97.3 (10-30-22 @ 05:00)  HR: 57 (10-30-22 @ 05:00)  BP: 172/84 (10-30-22 @ 05:00)  RR: 18 (10-30-22 @ 05:00)  SpO2: 96% (10-30-22 @ 05:00)  Wt(kg): --    PHYSICAL EXAM:  awake  Constitutional:  Eyes:  ENMT:perrla  Neck:  Respiratory:clear  Cardiovascular:s1s2,m-none  Gastrointestinal:soft,bs pos  Extremities:  Vascular:  Neurological:no focal deficit  Musculoskeletal:    MEDICATIONS  (STANDING):  ceFAZolin   IVPB 1000 milliGRAM(s) IV Intermittent every 8 hours  chlorhexidine 2% Cloths 1 Application(s) Topical <User Schedule>  dextrose 5%. 1000 milliLiter(s) (50 mL/Hr) IV Continuous <Continuous>  dextrose 5%. 1000 milliLiter(s) (100 mL/Hr) IV Continuous <Continuous>  dextrose 50% Injectable 25 Gram(s) IV Push once  dextrose 50% Injectable 12.5 Gram(s) IV Push once  dextrose 50% Injectable 25 Gram(s) IV Push once  enoxaparin Injectable 40 milliGRAM(s) SubCutaneous every 24 hours  gabapentin 100 milliGRAM(s) Oral two times a day  glucagon  Injectable 1 milliGRAM(s) IntraMuscular once  hydrochlorothiazide 25 milliGRAM(s) Oral daily  insulin lispro (ADMELOG) corrective regimen sliding scale   SubCutaneous three times a day before meals  insulin lispro (ADMELOG) corrective regimen sliding scale   SubCutaneous at bedtime  losartan 100 milliGRAM(s) Oral daily  montelukast 10 milliGRAM(s) Oral at bedtime  pantoprazole    Tablet      pantoprazole    Tablet 40 milliGRAM(s) Oral before breakfast  propranolol  milliGRAM(s) Oral daily  sodium chloride 0.9%. 1000 milliLiter(s) (80 mL/Hr) IV Continuous <Continuous>    MEDICATIONS  (PRN):  acetaminophen     Tablet .. 650 milliGRAM(s) Oral every 6 hours PRN Temp greater or equal to 38C (100.4F), Mild Pain (1 - 3)  acetaminophen 300 mG/butalbital 50 mG/ caffeine 40 mG 1 Capsule(s) Oral every 6 hours PRN migraine headache  aluminum hydroxide/magnesium hydroxide/simethicone Suspension 30 milliLiter(s) Oral every 4 hours PRN Dyspepsia  dextrose Oral Gel 15 Gram(s) Oral once PRN Blood Glucose LESS THAN 70 milliGRAM(s)/deciliter  sodium chloride 0.9% lock flush 10 milliLiter(s) IV Push every 1 hour PRN Pre/post blood products, medications, blood draw, and to maintain line patency      Allergies    codeine (Rash)    Intolerances      · Assessment	  76 year old female from home with PMH of HTN, DM, Patient had an I/D on 10/1 in the ED and was discharged on keflex. Presents with worsening purulent discharge from abscess, later found to + MSSA Bacteremia.SX and ID Braxton followed, no acute sx intervention needed at this time. Started on IV Ancef, repeat CX negative. S/P PICC placement, VERONICA no evidence endocarditis. Patient unable to afford outpatient IV home antibiotics and no financial support at home. Will remain inpatient for duration of IV antibiotic course until 11/6/22     Nutritional Assessment:  · Nutritional Assessment	Diet, Consistent Carbohydrate w/Evening Snack (10-07-22 @ 09:13) [Active]     Problem/Plan - 1:  ·  Problem: MSSA bacteremia.   ·  Plan: -  MSSA bacteremia secondary perineal Abscess  -  S/P I&D 10/1: culture positive staph aureus  -  Continue Ancef 1g iv q8  -  VERONICA no endocarditis noted  -  s/p PICC placement for 4 weeks of IV antibiotics, until 11/6  -  ID Dr. Limon.     Problem/Plan - 2:  ·  Problem: Perineal abscess.   ·  Plan: -  s/p I/D on 10/1   -  Continue daily packing   -  Continue antibiotics per ID: Ancef 1gm IV q8h until 11/6  -  ID Dr. Limon following.     Problem/Plan - 3:  ·  Problem: HTN (hypertension).   ·  Plan: -  controlled   -  Continue with  HCTZ  -  Continue with Losartan  -  Continue with Propranolol with parameters   - monitor blood pressures.     Problem/Plan - 4:  ·  Problem: Diabetes.   ·  Plan: -  HgbA1C 6.4 on admission  -  Monitor FS AC and HS  -  Sliding scale coverage as ordered  -  Consistent CHO diet.     Problem/Plan - 5:  ·  Problem: DVT prophylaxis.   ·  Plan: -  DVT prophylaxis with Lovenox  -  GI prophylaxis with Protonix.     Problem/Plan - 6:  ·  Problem: Discharge planning issues.   ·  Plan: -  Plan for discharge on  11/6/22  -  Patient needs IV antibiotics until 11/6/22 and cannot afford services at home.

## 2022-10-31 DIAGNOSIS — Z20.822 CONTACT WITH AND (SUSPECTED) EXPOSURE TO COVID-19: ICD-10-CM

## 2022-10-31 LAB
ANION GAP SERPL CALC-SCNC: 9 MMOL/L — SIGNIFICANT CHANGE UP (ref 5–17)
BUN SERPL-MCNC: 22 MG/DL — HIGH (ref 7–18)
CALCIUM SERPL-MCNC: 9.3 MG/DL — SIGNIFICANT CHANGE UP (ref 8.4–10.5)
CREAT SERPL-MCNC: 0.84 MG/DL — SIGNIFICANT CHANGE UP (ref 0.5–1.3)
CRP SERPL-MCNC: 3 MG/L — SIGNIFICANT CHANGE UP
EGFR: 72 ML/MIN/1.73M2 — SIGNIFICANT CHANGE UP
ERYTHROCYTE [SEDIMENTATION RATE] IN BLOOD: 18 MM/HR — SIGNIFICANT CHANGE UP (ref 0–20)
GLUCOSE BLDC GLUCOMTR-MCNC: 111 MG/DL — HIGH (ref 70–99)
GLUCOSE BLDC GLUCOMTR-MCNC: 116 MG/DL — HIGH (ref 70–99)
GLUCOSE BLDC GLUCOMTR-MCNC: 95 MG/DL — SIGNIFICANT CHANGE UP (ref 70–99)
GLUCOSE SERPL-MCNC: 113 MG/DL — HIGH (ref 70–99)
HCT VFR BLD CALC: 39.1 % — SIGNIFICANT CHANGE UP (ref 34.5–45)
HGB BLD-MCNC: 12.4 G/DL — SIGNIFICANT CHANGE UP (ref 11.5–15.5)
MCHC RBC-ENTMCNC: 30.8 PG — SIGNIFICANT CHANGE UP (ref 27–34)
MCHC RBC-ENTMCNC: 31.7 GM/DL — LOW (ref 32–36)
MCV RBC AUTO: 97.3 FL — SIGNIFICANT CHANGE UP (ref 80–100)
NRBC # BLD: 0 /100 WBCS — SIGNIFICANT CHANGE UP (ref 0–0)
PLATELET # BLD AUTO: 231 K/UL — SIGNIFICANT CHANGE UP (ref 150–400)
POTASSIUM SERPL-MCNC: 3.7 MMOL/L — SIGNIFICANT CHANGE UP (ref 3.5–5.3)
POTASSIUM SERPL-SCNC: 3.7 MMOL/L — SIGNIFICANT CHANGE UP (ref 3.5–5.3)
RBC # BLD: 4.02 M/UL — SIGNIFICANT CHANGE UP (ref 3.8–5.2)
RBC # FLD: 14 % — SIGNIFICANT CHANGE UP (ref 10.3–14.5)
WBC # BLD: 6.76 K/UL — SIGNIFICANT CHANGE UP (ref 3.8–10.5)
WBC # FLD AUTO: 6.76 K/UL — SIGNIFICANT CHANGE UP (ref 3.8–10.5)

## 2022-10-31 RX ADMIN — Medication 1 CAPSULE(S): at 20:00

## 2022-10-31 RX ADMIN — Medication 100 MILLIGRAM(S): at 14:57

## 2022-10-31 RX ADMIN — GABAPENTIN 100 MILLIGRAM(S): 400 CAPSULE ORAL at 05:21

## 2022-10-31 RX ADMIN — Medication 650 MILLIGRAM(S): at 11:00

## 2022-10-31 RX ADMIN — LOSARTAN POTASSIUM 100 MILLIGRAM(S): 100 TABLET, FILM COATED ORAL at 05:15

## 2022-10-31 RX ADMIN — Medication 160 MILLIGRAM(S): at 05:15

## 2022-10-31 RX ADMIN — Medication 650 MILLIGRAM(S): at 10:41

## 2022-10-31 RX ADMIN — Medication 1 CAPSULE(S): at 18:30

## 2022-10-31 RX ADMIN — MONTELUKAST 10 MILLIGRAM(S): 4 TABLET, CHEWABLE ORAL at 21:07

## 2022-10-31 RX ADMIN — PANTOPRAZOLE SODIUM 40 MILLIGRAM(S): 20 TABLET, DELAYED RELEASE ORAL at 06:06

## 2022-10-31 RX ADMIN — ENOXAPARIN SODIUM 40 MILLIGRAM(S): 100 INJECTION SUBCUTANEOUS at 06:06

## 2022-10-31 RX ADMIN — Medication 100 MILLIGRAM(S): at 05:14

## 2022-10-31 RX ADMIN — Medication 100 MILLIGRAM(S): at 21:07

## 2022-10-31 RX ADMIN — Medication 25 MILLIGRAM(S): at 05:15

## 2022-10-31 RX ADMIN — GABAPENTIN 100 MILLIGRAM(S): 400 CAPSULE ORAL at 17:49

## 2022-10-31 RX ADMIN — CHLORHEXIDINE GLUCONATE 1 APPLICATION(S): 213 SOLUTION TOPICAL at 05:18

## 2022-10-31 NOTE — PROGRESS NOTE ADULT - PROBLEM SELECTOR PLAN 4
Controlled   Continue HCTZ 25 mg, Losartan 100 mg and propanolol 160 mg daily with parameters  Monitor BP

## 2022-10-31 NOTE — PROGRESS NOTE ADULT - SUBJECTIVE AND OBJECTIVE BOX
Patient is a 76y old  Female who presents with a chief complaint of bacteriemia (30 Oct 2022 06:59)    OVERNIGHT EVENTS: no acute changes    REVIEW OF SYSTEMS:  CONSTITUTIONAL: No fever, chills  ENMT:  No difficulty hearing, no change in vision  NECK: No pain or stiffness  RESPIRATORY: No cough, SOB  CARDIOVASCULAR: No chest pain, palpitations  GASTROINTESTINAL: No abdominal pain. No nausea, vomiting, or diarrhea  GENITOURINARY: No dysuria  NEUROLOGICAL: No HA  SKIN: No itching, burning, rashes, or lesions   LYMPH NODES: No enlarged glands  ENDOCRINE: No heat or cold intolerance; No hair loss  MUSCULOSKELETAL: No joint pain or swelling; No muscle, back, or extremity pain  PSYCHIATRIC: No depression, anxiety  HEME/LYMPH: No easy bruising, or bleeding gums    T(C): 36.3 (10-31-22 @ 12:35), Max: 36.5 (10-31-22 @ 05:55)  HR: 56 (10-31-22 @ 12:35) (56 - 61)  BP: 122/73 (10-31-22 @ 12:35) (122/73 - 142/95)  RR: 17 (10-31-22 @ 12:35) (16 - 17)  SpO2: 95% (10-31-22 @ 12:35) (95% - 95%)  Wt(kg): --Vital Signs Last 24 Hrs  T(C): 36.3 (31 Oct 2022 12:35), Max: 36.5 (31 Oct 2022 05:55)  T(F): 97.3 (31 Oct 2022 12:35), Max: 97.7 (31 Oct 2022 05:55)  HR: 56 (31 Oct 2022 12:35) (56 - 61)  BP: 122/73 (31 Oct 2022 12:35) (122/73 - 142/95)  BP(mean): --  RR: 17 (31 Oct 2022 12:35) (16 - 17)  SpO2: 95% (31 Oct 2022 12:35) (95% - 95%)    Parameters below as of 31 Oct 2022 12:35  Patient On (Oxygen Delivery Method): room air        MEDICATIONS  (STANDING):  ceFAZolin   IVPB 1000 milliGRAM(s) IV Intermittent every 8 hours  chlorhexidine 2% Cloths 1 Application(s) Topical <User Schedule>  dextrose 5%. 1000 milliLiter(s) (50 mL/Hr) IV Continuous <Continuous>  dextrose 5%. 1000 milliLiter(s) (100 mL/Hr) IV Continuous <Continuous>  dextrose 50% Injectable 25 Gram(s) IV Push once  dextrose 50% Injectable 12.5 Gram(s) IV Push once  dextrose 50% Injectable 25 Gram(s) IV Push once  enoxaparin Injectable 40 milliGRAM(s) SubCutaneous every 24 hours  gabapentin 100 milliGRAM(s) Oral two times a day  glucagon  Injectable 1 milliGRAM(s) IntraMuscular once  hydrochlorothiazide 25 milliGRAM(s) Oral daily  insulin lispro (ADMELOG) corrective regimen sliding scale   SubCutaneous three times a day before meals  insulin lispro (ADMELOG) corrective regimen sliding scale   SubCutaneous at bedtime  losartan 100 milliGRAM(s) Oral daily  montelukast 10 milliGRAM(s) Oral at bedtime  pantoprazole    Tablet 40 milliGRAM(s) Oral before breakfast  pantoprazole    Tablet      propranolol  milliGRAM(s) Oral daily  sodium chloride 0.9%. 1000 milliLiter(s) (80 mL/Hr) IV Continuous <Continuous>    MEDICATIONS  (PRN):  acetaminophen     Tablet .. 650 milliGRAM(s) Oral every 6 hours PRN Temp greater or equal to 38C (100.4F), Mild Pain (1 - 3)  acetaminophen 300 mG/butalbital 50 mG/ caffeine 40 mG 1 Capsule(s) Oral every 6 hours PRN migraine headache  aluminum hydroxide/magnesium hydroxide/simethicone Suspension 30 milliLiter(s) Oral every 4 hours PRN Dyspepsia  dextrose Oral Gel 15 Gram(s) Oral once PRN Blood Glucose LESS THAN 70 milliGRAM(s)/deciliter  sodium chloride 0.9% lock flush 10 milliLiter(s) IV Push every 1 hour PRN Pre/post blood products, medications, blood draw, and to maintain line patency      PHYSICAL EXAM:  GENERAL: well-appearing, NAD  EYES: clear conjunctiva  ENMT: Moist mucous membranes  NECK: Supple, No JVD, Normal thyroid  CHEST/LUNG: Clear to auscultation bilaterally; No rales, rhonchi, wheezing, or rubs  HEART: S1, S2, Regular rate and rhythm  ABDOMEN: Soft, Nontender, Nondistended; Bowel sounds present  NEURO: Alert & Oriented X3  EXTREMITIES: No LE edema, no calf tenderness  LYMPH: No lymphadenopathy noted  SKIN: No rashes or lesions    Consultant(s) Notes Reviewed:  [x ] YES  [ ] NO  Care Discussed with Consultants/Other Providers [ x] YES  [ ] NO    LABS:                        12.4   6.76  )-----------( 231      ( 31 Oct 2022 06:08 )             39.1     10-31    137  |  102  |  22<H>  ----------------------------<  113<H>  3.7   |  26  |  0.84    Ca    9.3      31 Oct 2022 06:08        CAPILLARY BLOOD GLUCOSE      POCT Blood Glucose.: 95 mg/dL (31 Oct 2022 16:31)  POCT Blood Glucose.: 111 mg/dL (31 Oct 2022 11:53)  POCT Blood Glucose.: 102 mg/dL (31 Oct 2022 07:44)  POCT Blood Glucose.: 108 mg/dL (30 Oct 2022 21:46)            RADIOLOGY & ADDITIONAL TESTS:    Imaging Personally Reviewed:  [ ] YES  [ ] NO

## 2022-10-31 NOTE — PROGRESS NOTE ADULT - PROBLEM SELECTOR PLAN 2
S/p I/D on 10/1   CT A/P noted above  Surg recs no surgical intervention   Continue daily packing   Continue antibiotics   Continue chlorhexidine wipes daily  Dr. Limon, ID, following  Recommendations appreciated

## 2022-10-31 NOTE — PROGRESS NOTE ADULT - ASSESSMENT
76 year old female from home with PMH of HTN, DM, Patient had an I/D on 10/1 in the ED and was discharged on keflex. Presents with worsening purulent discharge from right perineal abscess, later found to + MSSA Bacteremia. SX and ID Braxton followed, no acute sx intervention needed at this time. Started on IV Ancef, repeat CX negative. S/P PICC placement, VERONICA no evidence endocarditis. Patient unable to afford outpatient IV home antibiotics and no financial support at home. Will remain inpatient for duration of IV antibiotic course until 11/6/22

## 2022-10-31 NOTE — PROGRESS NOTE ADULT - PROBLEM SELECTOR PLAN 1
Secondary perineal Abscess  S/P I&D 10/1: Initial culture positive staph aureus  Repeat Blood culture negative   VERONICA neg for endocarditis  PICC placed for ABX  Continue Ancef 1g iv q8 until 11/6  Dr. Limon, ID, following  Recommendations appreciated

## 2022-11-01 LAB
GLUCOSE BLDC GLUCOMTR-MCNC: 104 MG/DL — HIGH (ref 70–99)
GLUCOSE BLDC GLUCOMTR-MCNC: 108 MG/DL — HIGH (ref 70–99)
GLUCOSE BLDC GLUCOMTR-MCNC: 125 MG/DL — HIGH (ref 70–99)
GLUCOSE BLDC GLUCOMTR-MCNC: 136 MG/DL — HIGH (ref 70–99)
SARS-COV-2 RNA SPEC QL NAA+PROBE: SIGNIFICANT CHANGE UP

## 2022-11-01 RX ADMIN — PANTOPRAZOLE SODIUM 40 MILLIGRAM(S): 20 TABLET, DELAYED RELEASE ORAL at 06:08

## 2022-11-01 RX ADMIN — MONTELUKAST 10 MILLIGRAM(S): 4 TABLET, CHEWABLE ORAL at 21:07

## 2022-11-01 RX ADMIN — Medication 100 MILLIGRAM(S): at 05:15

## 2022-11-01 RX ADMIN — ENOXAPARIN SODIUM 40 MILLIGRAM(S): 100 INJECTION SUBCUTANEOUS at 05:58

## 2022-11-01 RX ADMIN — Medication 1 CAPSULE(S): at 21:07

## 2022-11-01 RX ADMIN — Medication 100 MILLIGRAM(S): at 21:07

## 2022-11-01 RX ADMIN — Medication 1 CAPSULE(S): at 19:50

## 2022-11-01 RX ADMIN — GABAPENTIN 100 MILLIGRAM(S): 400 CAPSULE ORAL at 18:06

## 2022-11-01 RX ADMIN — LOSARTAN POTASSIUM 100 MILLIGRAM(S): 100 TABLET, FILM COATED ORAL at 05:15

## 2022-11-01 RX ADMIN — CHLORHEXIDINE GLUCONATE 1 APPLICATION(S): 213 SOLUTION TOPICAL at 05:18

## 2022-11-01 RX ADMIN — GABAPENTIN 100 MILLIGRAM(S): 400 CAPSULE ORAL at 05:15

## 2022-11-01 RX ADMIN — Medication 160 MILLIGRAM(S): at 05:15

## 2022-11-01 RX ADMIN — Medication 100 MILLIGRAM(S): at 15:37

## 2022-11-01 RX ADMIN — Medication 25 MILLIGRAM(S): at 05:16

## 2022-11-01 NOTE — PROGRESS NOTE ADULT - SUBJECTIVE AND OBJECTIVE BOX
Patient is a 76y old  Female who presents with a chief complaint of perineal abscess s/p I&D with MSSA bacteremia (31 Oct 2022 16:52)    OVERNIGHT EVENTS: no acute changes    REVIEW OF SYSTEMS:  CONSTITUTIONAL: No fever, chills  ENMT:  No difficulty hearing, no change in vision  NECK: No pain or stiffness  RESPIRATORY: No cough, SOB  CARDIOVASCULAR: No chest pain, palpitations  GASTROINTESTINAL: No abdominal pain. No nausea, vomiting, or diarrhea  GENITOURINARY: No dysuria  NEUROLOGICAL: No HA  SKIN: No itching, burning, rashes, or lesions   LYMPH NODES: No enlarged glands  ENDOCRINE: No heat or cold intolerance; No hair loss  MUSCULOSKELETAL: No joint pain or swelling; No muscle, back, or extremity pain  PSYCHIATRIC: No depression, anxiety  HEME/LYMPH: No easy bruising, or bleeding gums    T(C): 36.6 (11-01-22 @ 11:36), Max: 36.6 (11-01-22 @ 11:36)  HR: 56 (11-01-22 @ 11:36) (56 - 61)  BP: 133/77 (11-01-22 @ 11:36) (129/79 - 133/77)  RR: 14 (11-01-22 @ 11:36) (14 - 18)  SpO2: 94% (11-01-22 @ 11:36) (94% - 95%)  Wt(kg): --Vital Signs Last 24 Hrs  T(C): 36.6 (01 Nov 2022 11:36), Max: 36.6 (01 Nov 2022 11:36)  T(F): 97.9 (01 Nov 2022 11:36), Max: 97.9 (01 Nov 2022 11:36)  HR: 56 (01 Nov 2022 11:36) (56 - 61)  BP: 133/77 (01 Nov 2022 11:36) (129/79 - 133/77)  BP(mean): --  RR: 14 (01 Nov 2022 11:36) (14 - 18)  SpO2: 94% (01 Nov 2022 11:36) (94% - 95%)    Parameters below as of 01 Nov 2022 11:36  Patient On (Oxygen Delivery Method): room air        MEDICATIONS  (STANDING):  ceFAZolin   IVPB 1000 milliGRAM(s) IV Intermittent every 8 hours  chlorhexidine 2% Cloths 1 Application(s) Topical <User Schedule>  dextrose 5%. 1000 milliLiter(s) (50 mL/Hr) IV Continuous <Continuous>  dextrose 5%. 1000 milliLiter(s) (100 mL/Hr) IV Continuous <Continuous>  dextrose 50% Injectable 25 Gram(s) IV Push once  dextrose 50% Injectable 25 Gram(s) IV Push once  dextrose 50% Injectable 12.5 Gram(s) IV Push once  enoxaparin Injectable 40 milliGRAM(s) SubCutaneous every 24 hours  gabapentin 100 milliGRAM(s) Oral two times a day  glucagon  Injectable 1 milliGRAM(s) IntraMuscular once  hydrochlorothiazide 25 milliGRAM(s) Oral daily  insulin lispro (ADMELOG) corrective regimen sliding scale   SubCutaneous three times a day before meals  insulin lispro (ADMELOG) corrective regimen sliding scale   SubCutaneous at bedtime  losartan 100 milliGRAM(s) Oral daily  montelukast 10 milliGRAM(s) Oral at bedtime  pantoprazole    Tablet      pantoprazole    Tablet 40 milliGRAM(s) Oral before breakfast  propranolol  milliGRAM(s) Oral daily  sodium chloride 0.9%. 1000 milliLiter(s) (80 mL/Hr) IV Continuous <Continuous>    MEDICATIONS  (PRN):  acetaminophen     Tablet .. 650 milliGRAM(s) Oral every 6 hours PRN Temp greater or equal to 38C (100.4F), Mild Pain (1 - 3)  acetaminophen 300 mG/butalbital 50 mG/ caffeine 40 mG 1 Capsule(s) Oral every 6 hours PRN migraine headache  aluminum hydroxide/magnesium hydroxide/simethicone Suspension 30 milliLiter(s) Oral every 4 hours PRN Dyspepsia  dextrose Oral Gel 15 Gram(s) Oral once PRN Blood Glucose LESS THAN 70 milliGRAM(s)/deciliter  sodium chloride 0.9% lock flush 10 milliLiter(s) IV Push every 1 hour PRN Pre/post blood products, medications, blood draw, and to maintain line patency      PHYSICAL EXAM:  GENERAL: well-appearing, NAD  EYES: clear conjunctiva  ENMT: Moist mucous membranes  NECK: Supple, No JVD, Normal thyroid  CHEST/LUNG: Clear to auscultation bilaterally; No rales, rhonchi, wheezing, or rubs  HEART: S1, S2, Regular rate and rhythm  ABDOMEN: Soft, Nontender, Nondistended; Bowel sounds present  : +small right perineal lump, no tenderness, erythema or drainage present  NEURO: Alert & Oriented X3  EXTREMITIES: No LE edema, no calf tenderness  LYMPH: No lymphadenopathy noted  SKIN: No rashes or lesions    Consultant(s) Notes Reviewed:  [x ] YES  [ ] NO  Care Discussed with Consultants/Other Providers [ x] YES  [ ] NO    LABS:                        12.4   6.76  )-----------( 231      ( 31 Oct 2022 06:08 )             39.1     10-31    137  |  102  |  22<H>  ----------------------------<  113<H>  3.7   |  26  |  0.84    Ca    9.3      31 Oct 2022 06:08        CAPILLARY BLOOD GLUCOSE      POCT Blood Glucose.: 104 mg/dL (01 Nov 2022 12:05)  POCT Blood Glucose.: 108 mg/dL (01 Nov 2022 07:42)  POCT Blood Glucose.: 116 mg/dL (31 Oct 2022 21:24)  POCT Blood Glucose.: 95 mg/dL (31 Oct 2022 16:31)            RADIOLOGY & ADDITIONAL TESTS:    Imaging Personally Reviewed:  [ ] YES  [ ] NO

## 2022-11-01 NOTE — PROGRESS NOTE ADULT - SUBJECTIVE AND OBJECTIVE BOX
76y Female is under our care for right buttock abscess (s/p I&D) and MSSA bacteremia. Patient is resting comfortably on bed and has no new complaints. Remains afebrile with normal wbc count.     MEDS:  ceFAZolin   IVPB 1000 milliGRAM(s) IV Intermittent every 8 hours    ALLERGIES: Allergies    codeine (Rash)    Intolerances    REVIEW OF SYSTEMS:  [  ] Not able to illicit  General: no fevers no malaise  Chest: no cough no sob  GI: no nvd  : no urinary sxs   Skin: no rashes  Musculoskeletal: no trauma no LBP  Neuro: no ha's no dizziness     VITALS:  Vital Signs Last 24 Hrs  T(C): 36.6 (11-01-22 @ 11:36), Max: 36.6 (11-01-22 @ 11:36)  T(F): 97.9 (11-01-22 @ 11:36), Max: 97.9 (11-01-22 @ 11:36)  HR: 56 (11-01-22 @ 11:36) (56 - 61)  BP: 133/77 (11-01-22 @ 11:36) (122/73 - 133/77)  BP(mean): --  RR: 14 (11-01-22 @ 11:36) (14 - 18)  SpO2: 94% (11-01-22 @ 11:36) (94% - 95%)    PHYSICAL EXAM:  HEENT: n/a  Neck: supple no LN's   Respiratory: lungs clear no rales  Cardiovascular: S1 S2 reg no murmurs  Gastrointestinal: +BS with soft, nondistended abdomen; nontender  Extremities: no edema, +left arm PICC line  Skin: no rashes  Ortho: n/a  Neuro: awake and alert    LABS/DIAGNOSTIC TESTS:    No new labs                          12.4   6.76  )-----------( 231      ( 31 Oct 2022 06:08 )             39.1   WBC Count: 6.76 K/uL (10-31 @ 06:08)  WBC Count: 6.03 K/uL (10-28 @ 06:15)    10-31    137  |  102  |  22<H>  ----------------------------<  113<H>  3.7   |  26  |  0.84    Ca    9.3      31 Oct 2022 06:08      CULTURES:   .Blood Blood-Peripheral  10-09 @ 06:24   No Growth Final  --  --      .Blood Blood-Peripheral  10-09 @ 06:02   No Growth Final  --  --      .Blood Blood-Peripheral  10-06 @ 12:00   Growth in aerobic and anaerobic bottles: Staphylococcus aureus  See previous culture  [id number]  --  Blood Culture PCR  Staphylococcus aureus      .Abscess Leg - Right  10-01 @ 13:10   Numerous Staphylococcus aureus  --  Staphylococcus aureus      RADIOLOGY:  no new studies

## 2022-11-01 NOTE — PROGRESS NOTE ADULT - ASSESSMENT
76 year old female from home with PMH of HTN, DM, Patient had an I/D on 10/1 in the ED and was discharged on keflex. Presents with worsening purulent discharge from right perineal abscess, later found to + MSSA Bacteremia. SX and ID Braxton followed, no acute sx intervention needed at this time. Started on IV Ancef, repeat CX negative. S/P PICC placement, VERONICA no evidence endocarditis. Patient unable to afford outpatient IV home antibiotics and no financial support at home. Will remain inpatient for duration of IV antibiotic course until 11/6/22.

## 2022-11-02 LAB
GLUCOSE BLDC GLUCOMTR-MCNC: 107 MG/DL — HIGH (ref 70–99)
GLUCOSE BLDC GLUCOMTR-MCNC: 109 MG/DL — HIGH (ref 70–99)
GLUCOSE BLDC GLUCOMTR-MCNC: 121 MG/DL — HIGH (ref 70–99)
GLUCOSE BLDC GLUCOMTR-MCNC: 98 MG/DL — SIGNIFICANT CHANGE UP (ref 70–99)

## 2022-11-02 RX ADMIN — GABAPENTIN 100 MILLIGRAM(S): 400 CAPSULE ORAL at 05:23

## 2022-11-02 RX ADMIN — PANTOPRAZOLE SODIUM 40 MILLIGRAM(S): 20 TABLET, DELAYED RELEASE ORAL at 05:25

## 2022-11-02 RX ADMIN — MONTELUKAST 10 MILLIGRAM(S): 4 TABLET, CHEWABLE ORAL at 22:14

## 2022-11-02 RX ADMIN — GABAPENTIN 100 MILLIGRAM(S): 400 CAPSULE ORAL at 17:26

## 2022-11-02 RX ADMIN — Medication 100 MILLIGRAM(S): at 13:21

## 2022-11-02 RX ADMIN — LOSARTAN POTASSIUM 100 MILLIGRAM(S): 100 TABLET, FILM COATED ORAL at 05:23

## 2022-11-02 RX ADMIN — Medication 100 MILLIGRAM(S): at 22:18

## 2022-11-02 RX ADMIN — ENOXAPARIN SODIUM 40 MILLIGRAM(S): 100 INJECTION SUBCUTANEOUS at 05:25

## 2022-11-02 RX ADMIN — Medication 100 MILLIGRAM(S): at 05:22

## 2022-11-02 RX ADMIN — CHLORHEXIDINE GLUCONATE 1 APPLICATION(S): 213 SOLUTION TOPICAL at 05:22

## 2022-11-02 NOTE — PROGRESS NOTE ADULT - SUBJECTIVE AND OBJECTIVE BOX
Patient is a 76y old  Female who presents with a chief complaint of abscess (02 Nov 2022 12:25)    OVERNIGHT EVENTS: pt was covid exposed again on 11/1    REVIEW OF SYSTEMS:  CONSTITUTIONAL: No fever, chills  ENMT:  No difficulty hearing, no change in vision  NECK: No pain or stiffness  RESPIRATORY: No cough, SOB  CARDIOVASCULAR: No chest pain, palpitations  GASTROINTESTINAL: No abdominal pain. No nausea, vomiting, or diarrhea  GENITOURINARY: No dysuria  NEUROLOGICAL: No HA  SKIN: No itching, burning, rashes, or lesions   LYMPH NODES: No enlarged glands  ENDOCRINE: No heat or cold intolerance; No hair loss  MUSCULOSKELETAL: No joint pain or swelling; No muscle, back, or extremity pain  PSYCHIATRIC: No depression, anxiety  HEME/LYMPH: No easy bruising, or bleeding gums    T(C): 36.4 (11-02-22 @ 13:17), Max: 36.4 (11-02-22 @ 13:17)  HR: 65 (11-02-22 @ 13:17) (58 - 65)  BP: 142/85 (11-02-22 @ 13:17) (119/69 - 163/87)  RR: 16 (11-02-22 @ 13:17) (16 - 19)  SpO2: 94% (11-02-22 @ 13:17) (94% - 96%)  Wt(kg): --Vital Signs Last 24 Hrs  T(C): 36.4 (02 Nov 2022 13:17), Max: 36.4 (02 Nov 2022 13:17)  T(F): 97.6 (02 Nov 2022 13:17), Max: 97.6 (02 Nov 2022 13:17)  HR: 65 (02 Nov 2022 13:17) (58 - 65)  BP: 142/85 (02 Nov 2022 13:17) (119/69 - 163/87)  BP(mean): --  RR: 16 (02 Nov 2022 13:17) (16 - 19)  SpO2: 94% (02 Nov 2022 13:17) (94% - 96%)    Parameters below as of 02 Nov 2022 13:17  Patient On (Oxygen Delivery Method): room air        MEDICATIONS  (STANDING):  ceFAZolin   IVPB 1000 milliGRAM(s) IV Intermittent every 8 hours  chlorhexidine 2% Cloths 1 Application(s) Topical <User Schedule>  dextrose 5%. 1000 milliLiter(s) (50 mL/Hr) IV Continuous <Continuous>  dextrose 5%. 1000 milliLiter(s) (100 mL/Hr) IV Continuous <Continuous>  dextrose 50% Injectable 25 Gram(s) IV Push once  dextrose 50% Injectable 12.5 Gram(s) IV Push once  dextrose 50% Injectable 25 Gram(s) IV Push once  enoxaparin Injectable 40 milliGRAM(s) SubCutaneous every 24 hours  gabapentin 100 milliGRAM(s) Oral two times a day  glucagon  Injectable 1 milliGRAM(s) IntraMuscular once  hydrochlorothiazide 25 milliGRAM(s) Oral daily  insulin lispro (ADMELOG) corrective regimen sliding scale   SubCutaneous three times a day before meals  insulin lispro (ADMELOG) corrective regimen sliding scale   SubCutaneous at bedtime  losartan 100 milliGRAM(s) Oral daily  montelukast 10 milliGRAM(s) Oral at bedtime  pantoprazole    Tablet 40 milliGRAM(s) Oral before breakfast  pantoprazole    Tablet      propranolol  milliGRAM(s) Oral daily  sodium chloride 0.9%. 1000 milliLiter(s) (80 mL/Hr) IV Continuous <Continuous>    MEDICATIONS  (PRN):  acetaminophen     Tablet .. 650 milliGRAM(s) Oral every 6 hours PRN Temp greater or equal to 38C (100.4F), Mild Pain (1 - 3)  acetaminophen 300 mG/butalbital 50 mG/ caffeine 40 mG 1 Capsule(s) Oral every 6 hours PRN migraine headache  aluminum hydroxide/magnesium hydroxide/simethicone Suspension 30 milliLiter(s) Oral every 4 hours PRN Dyspepsia  dextrose Oral Gel 15 Gram(s) Oral once PRN Blood Glucose LESS THAN 70 milliGRAM(s)/deciliter  sodium chloride 0.9% lock flush 10 milliLiter(s) IV Push every 1 hour PRN Pre/post blood products, medications, blood draw, and to maintain line patency      PHYSICAL EXAM:  GENERAL: well-appearing, NAD  EYES: clear conjunctiva  ENMT: Moist mucous membranes  NECK: Supple, No JVD, Normal thyroid  CHEST/LUNG: Clear to auscultation bilaterally; No rales, rhonchi, wheezing, or rubs  HEART: S1, S2, Regular rate and rhythm  ABDOMEN: Soft, Nontender, Nondistended; Bowel sounds present  : +small right perineal lump, no tenderness, erythema or drainage present  NEURO: Alert & Oriented X3  EXTREMITIES: No LE edema, no calf tenderness  LYMPH: No lymphadenopathy noted  SKIN: No rashes or lesions    Consultant(s) Notes Reviewed:  [x ] YES  [ ] NO  Care Discussed with Consultants/Other Providers [ x] YES  [ ] NO    LABS:            CAPILLARY BLOOD GLUCOSE      POCT Blood Glucose.: 98 mg/dL (02 Nov 2022 11:50)  POCT Blood Glucose.: 109 mg/dL (02 Nov 2022 07:54)  POCT Blood Glucose.: 125 mg/dL (01 Nov 2022 21:11)  POCT Blood Glucose.: 136 mg/dL (01 Nov 2022 16:27)            RADIOLOGY & ADDITIONAL TESTS:    Imaging Personally Reviewed:  [ ] YES  [ ] NO

## 2022-11-02 NOTE — PROGRESS NOTE ADULT - PROBLEM SELECTOR PLAN 5
on home med metFORMIN 500 mg oral tablet: 1 tab(s) orally 2 times a day  Controlled  Continue sliding scale insulin coverage  Continue glucose monitoring  Continue low carb diet

## 2022-11-02 NOTE — PROGRESS NOTE ADULT - PROBLEM SELECTOR PLAN 3
exposed on 10/27 and again on 11/1  asymptomatic  repeat covid on 11/1 negative  next covid test on 11/6  continue droplet iso

## 2022-11-02 NOTE — PROGRESS NOTE ADULT - PROBLEM SELECTOR PLAN 7
Pt needs IV antibiotics until 11/6/22 and cannot afford services at home.  Pt will be stable for d/c s/p 11/6 ABX dose  next covid test 11/6  Care management following for discharge planning

## 2022-11-02 NOTE — PROGRESS NOTE ADULT - ASSESSMENT
76 year old female from home with PMH of HTN, DM, Patient had an I/D on 10/1 in the ED and was discharged on keflex. Presents with worsening purulent discharge from right perineal abscess, later found to + MSSA Bacteremia. SX and ID Braxton followed, no acute sx intervention needed at this time. Started on IV Ancef, repeat CX negative. S/P PICC placement, VERONICA no evidence endocarditis. Patient unable to afford outpatient IV home antibiotics and no financial support at home. Will remain inpatient for duration of IV antibiotic course until 11/6/22. Pt was exposed to covid-19 again on 11/1, also pending repeat covid test on day 5 (11/6).

## 2022-11-02 NOTE — PROGRESS NOTE ADULT - SUBJECTIVE AND OBJECTIVE BOX
INTERVAL HPI/OVERNIGHT EVENTS:  Patient seen,events noticed ,stable  VITAL SIGNS:  T(F): 97.1 (11-02-22 @ 05:05)  HR: 58 (11-02-22 @ 05:05)  BP: 119/69 (11-02-22 @ 05:05)  RR: 18 (11-02-22 @ 05:05)  SpO2: 94% (11-02-22 @ 05:05)  Wt(kg): --    PHYSICAL EXAM:  awake  Constitutional:  Eyes:  ENMT:perrla  Neck:  Respiratory:clear  Cardiovascular:s1s2,m-none  Gastrointestinal:soft,bs pos  Extremities:  Vascular:  Neurological:no focal deficit  Musculoskeletal:    MEDICATIONS  (STANDING):  ceFAZolin   IVPB 1000 milliGRAM(s) IV Intermittent every 8 hours  chlorhexidine 2% Cloths 1 Application(s) Topical <User Schedule>  dextrose 5%. 1000 milliLiter(s) (100 mL/Hr) IV Continuous <Continuous>  dextrose 5%. 1000 milliLiter(s) (50 mL/Hr) IV Continuous <Continuous>  dextrose 50% Injectable 25 Gram(s) IV Push once  dextrose 50% Injectable 12.5 Gram(s) IV Push once  dextrose 50% Injectable 25 Gram(s) IV Push once  enoxaparin Injectable 40 milliGRAM(s) SubCutaneous every 24 hours  gabapentin 100 milliGRAM(s) Oral two times a day  glucagon  Injectable 1 milliGRAM(s) IntraMuscular once  hydrochlorothiazide 25 milliGRAM(s) Oral daily  insulin lispro (ADMELOG) corrective regimen sliding scale   SubCutaneous three times a day before meals  insulin lispro (ADMELOG) corrective regimen sliding scale   SubCutaneous at bedtime  losartan 100 milliGRAM(s) Oral daily  montelukast 10 milliGRAM(s) Oral at bedtime  pantoprazole    Tablet      pantoprazole    Tablet 40 milliGRAM(s) Oral before breakfast  propranolol  milliGRAM(s) Oral daily  sodium chloride 0.9%. 1000 milliLiter(s) (80 mL/Hr) IV Continuous <Continuous>    MEDICATIONS  (PRN):  acetaminophen     Tablet .. 650 milliGRAM(s) Oral every 6 hours PRN Temp greater or equal to 38C (100.4F), Mild Pain (1 - 3)  acetaminophen 300 mG/butalbital 50 mG/ caffeine 40 mG 1 Capsule(s) Oral every 6 hours PRN migraine headache  aluminum hydroxide/magnesium hydroxide/simethicone Suspension 30 milliLiter(s) Oral every 4 hours PRN Dyspepsia  dextrose Oral Gel 15 Gram(s) Oral once PRN Blood Glucose LESS THAN 70 milliGRAM(s)/deciliter  sodium chloride 0.9% lock flush 10 milliLiter(s) IV Push every 1 hour PRN Pre/post blood products, medications, blood draw, and to maintain line patency      Allergies    codeine (Rash)    Intolerances        LABS:                RADIOLOGY & ADDITIONAL TESTS:      Assessment and Plan:   · Assessment	  76 year old female from home with PMH of HTN, DM, Patient had an I/D on 10/1 in the ED and was discharged on keflex. Presents with worsening purulent discharge from right perineal abscess, later found to + MSSA Bacteremia. SX and ID Braxton followed, no acute sx intervention needed at this time. Started on IV Ancef, repeat CX negative. S/P PICC placement, VERONICA no evidence endocarditis. Patient unable to afford outpatient IV home antibiotics and no financial support at home. Will remain inpatient for duration of IV antibiotic course until 11/6/22.      Nutritional Assessment:  · Nutritional Assessment	Diet, Consistent Carbohydrate w/Evening Snack (10-07-22 @ 09:13) [Active]     Problem/Plan - 1:  ·  Problem: MSSA bacteremia.   ·  Plan: Secondary perineal Abscess  PICC placed for ABX  Continue Ancef 1g iv q8 until 11/6  TOMMY Vernon, following.     Problem/Plan - 2:  ·  Problem: Perineal abscess.   ·  Plan: S/p I/D on 10/1   Continue daily packing   Continue antibiotics   TOMMY Vernon, following  Recommendations appreciated.     Problem/Plan - 3:  ·  Problem: Exposure to COVID-19 virus.   ·  Plan: exposed on 10/27  asymptomatic  repeat covid on 11/1 negative  continue droplet iso.     Problem/Plan - 4:  ·  Problem: HTN (hypertension).   ·  Plan: Controlled   Continue HCTZ 25 mg, Losartan 100 mg and propanolol 160 mg daily with parameters  Monitor BP.     Problem/Plan - 5:  ·  Problem: Diabetes.   ·  Plan: Controlled  Continue sliding scale insulin coverage  Continue glucose monitoring  Continue low carb diet.     Problem/Plan - 6:  ·  Problem: DVT prophylaxis.   ·  Plan: DVT PPX: Lovenox   GI PPX: PPI.     Problem/Plan - 7:  ·  Problem: Discharge planning issues.   ·  Plan: Pt needs IV antibiotics until 11/6/22 and cannot afford services at home.  Pt will be stable for d/c s/p 11/6 ABX dose  Care management following for discharge planning.

## 2022-11-03 LAB
GLUCOSE BLDC GLUCOMTR-MCNC: 111 MG/DL — HIGH (ref 70–99)
GLUCOSE BLDC GLUCOMTR-MCNC: 114 MG/DL — HIGH (ref 70–99)
GLUCOSE BLDC GLUCOMTR-MCNC: 124 MG/DL — HIGH (ref 70–99)
GLUCOSE BLDC GLUCOMTR-MCNC: 97 MG/DL — SIGNIFICANT CHANGE UP (ref 70–99)

## 2022-11-03 RX ADMIN — GABAPENTIN 100 MILLIGRAM(S): 400 CAPSULE ORAL at 06:09

## 2022-11-03 RX ADMIN — CHLORHEXIDINE GLUCONATE 1 APPLICATION(S): 213 SOLUTION TOPICAL at 06:19

## 2022-11-03 RX ADMIN — Medication 100 MILLIGRAM(S): at 23:34

## 2022-11-03 RX ADMIN — Medication 100 MILLIGRAM(S): at 06:10

## 2022-11-03 RX ADMIN — MONTELUKAST 10 MILLIGRAM(S): 4 TABLET, CHEWABLE ORAL at 23:34

## 2022-11-03 RX ADMIN — Medication 160 MILLIGRAM(S): at 06:09

## 2022-11-03 RX ADMIN — Medication 25 MILLIGRAM(S): at 06:10

## 2022-11-03 RX ADMIN — ENOXAPARIN SODIUM 40 MILLIGRAM(S): 100 INJECTION SUBCUTANEOUS at 06:09

## 2022-11-03 RX ADMIN — PANTOPRAZOLE SODIUM 40 MILLIGRAM(S): 20 TABLET, DELAYED RELEASE ORAL at 06:09

## 2022-11-03 RX ADMIN — GABAPENTIN 100 MILLIGRAM(S): 400 CAPSULE ORAL at 17:21

## 2022-11-03 RX ADMIN — Medication 100 MILLIGRAM(S): at 13:59

## 2022-11-03 RX ADMIN — LOSARTAN POTASSIUM 100 MILLIGRAM(S): 100 TABLET, FILM COATED ORAL at 06:10

## 2022-11-03 NOTE — PROGRESS NOTE ADULT - SUBJECTIVE AND OBJECTIVE BOX
76y Female is under our care for right buttock abscess (s/p I&D) and MSSA bacteremia. Patient is in same disposition with no overnight events. Remains afebrile with normal wbc count.     MEDS:  ceFAZolin   IVPB 1000 milliGRAM(s) IV Intermittent every 8 hours    ALLERGIES: Allergies    codeine (Rash)    Intolerances    REVIEW OF SYSTEMS:  [  ] Not able to illicit  General: no fevers no malaise  Chest: no cough no sob  GI: no nvd  Skin: no rashes  Musculoskeletal: no trauma no LBP  Neuro: no ha's no dizziness     VITALS:  Vital Signs Last 24 Hrs  T(C): 36.7 (11-03-22 @ 11:45), Max: 36.7 (11-03-22 @ 11:45)  T(F): 98 (11-03-22 @ 11:45), Max: 98 (11-03-22 @ 11:45)  HR: 55 (11-03-22 @ 11:45) (55 - 71)  BP: 129/88 (11-03-22 @ 11:45) (129/88 - 147/90)  BP(mean): --  RR: 17 (11-03-22 @ 11:45) (16 - 17)  SpO2: 94% (11-03-22 @ 11:45) (93% - 95%)    PHYSICAL EXAM:  HEENT: n/a  Neck: supple no LN's   Respiratory: lungs clear no rales  Cardiovascular: S1 S2 reg no murmurs  Gastrointestinal: +BS with soft, nondistended abdomen; nontender  Extremities: no edema, +left arm PICC line  Skin: no rashes  Ortho: n/a  Neuro: awake and alert    LABS/DIAGNOSTIC TESTS:    No new labs          CULTURES:   .Blood Blood-Peripheral  10-09 @ 06:24   No Growth Final  --  --      .Blood Blood-Peripheral  10-09 @ 06:02   No Growth Final  --  --      .Blood Blood-Peripheral  10-06 @ 12:00   Growth in aerobic and anaerobic bottles: Staphylococcus aureus  See previous culture  [id number]  --  Blood Culture PCR  Staphylococcus aureus      .Abscess Leg - Right  10-01 @ 13:10   Numerous Staphylococcus aureus  --  Staphylococcus aureus      RADIOLOGY:  no new studies

## 2022-11-03 NOTE — PROGRESS NOTE ADULT - SUBJECTIVE AND OBJECTIVE BOX
Patient is a 76y old  Female who presents with a chief complaint of abscess (03 Nov 2022 16:17)    OVERNIGHT EVENTS: no acute changes    REVIEW OF SYSTEMS:  CONSTITUTIONAL: No fever, chills  ENMT:  No difficulty hearing, no change in vision  NECK: No pain or stiffness  RESPIRATORY: No cough, SOB  CARDIOVASCULAR: No chest pain, palpitations  GASTROINTESTINAL: No abdominal pain. No nausea, vomiting, or diarrhea  GENITOURINARY: No dysuria  NEUROLOGICAL: No HA  SKIN: No itching, burning, rashes, or lesions   LYMPH NODES: No enlarged glands  ENDOCRINE: No heat or cold intolerance; No hair loss  MUSCULOSKELETAL: No joint pain or swelling; No muscle, back, or extremity pain  PSYCHIATRIC: No depression, anxiety  HEME/LYMPH: No easy bruising, or bleeding gums    T(C): 36.7 (11-03-22 @ 11:45), Max: 36.7 (11-03-22 @ 11:45)  HR: 55 (11-03-22 @ 11:45) (55 - 71)  BP: 129/88 (11-03-22 @ 11:45) (129/88 - 147/90)  RR: 17 (11-03-22 @ 11:45) (17 - 17)  SpO2: 94% (11-03-22 @ 11:45) (93% - 95%)  Wt(kg): --Vital Signs Last 24 Hrs  T(C): 36.7 (03 Nov 2022 11:45), Max: 36.7 (03 Nov 2022 11:45)  T(F): 98 (03 Nov 2022 11:45), Max: 98 (03 Nov 2022 11:45)  HR: 55 (03 Nov 2022 11:45) (55 - 71)  BP: 129/88 (03 Nov 2022 11:45) (129/88 - 147/90)  BP(mean): --  RR: 17 (03 Nov 2022 11:45) (17 - 17)  SpO2: 94% (03 Nov 2022 11:45) (93% - 95%)    Parameters below as of 03 Nov 2022 11:45  Patient On (Oxygen Delivery Method): room air        MEDICATIONS  (STANDING):  ceFAZolin   IVPB 1000 milliGRAM(s) IV Intermittent every 8 hours  chlorhexidine 2% Cloths 1 Application(s) Topical <User Schedule>  dextrose 5%. 1000 milliLiter(s) (50 mL/Hr) IV Continuous <Continuous>  dextrose 5%. 1000 milliLiter(s) (100 mL/Hr) IV Continuous <Continuous>  dextrose 50% Injectable 25 Gram(s) IV Push once  dextrose 50% Injectable 12.5 Gram(s) IV Push once  dextrose 50% Injectable 25 Gram(s) IV Push once  enoxaparin Injectable 40 milliGRAM(s) SubCutaneous every 24 hours  gabapentin 100 milliGRAM(s) Oral two times a day  glucagon  Injectable 1 milliGRAM(s) IntraMuscular once  hydrochlorothiazide 25 milliGRAM(s) Oral daily  insulin lispro (ADMELOG) corrective regimen sliding scale   SubCutaneous three times a day before meals  insulin lispro (ADMELOG) corrective regimen sliding scale   SubCutaneous at bedtime  losartan 100 milliGRAM(s) Oral daily  montelukast 10 milliGRAM(s) Oral at bedtime  pantoprazole    Tablet      pantoprazole    Tablet 40 milliGRAM(s) Oral before breakfast  propranolol  milliGRAM(s) Oral daily  sodium chloride 0.9%. 1000 milliLiter(s) (80 mL/Hr) IV Continuous <Continuous>    MEDICATIONS  (PRN):  acetaminophen     Tablet .. 650 milliGRAM(s) Oral every 6 hours PRN Temp greater or equal to 38C (100.4F), Mild Pain (1 - 3)  acetaminophen 300 mG/butalbital 50 mG/ caffeine 40 mG 1 Capsule(s) Oral every 6 hours PRN migraine headache  aluminum hydroxide/magnesium hydroxide/simethicone Suspension 30 milliLiter(s) Oral every 4 hours PRN Dyspepsia  dextrose Oral Gel 15 Gram(s) Oral once PRN Blood Glucose LESS THAN 70 milliGRAM(s)/deciliter  sodium chloride 0.9% lock flush 10 milliLiter(s) IV Push every 1 hour PRN Pre/post blood products, medications, blood draw, and to maintain line patency      PHYSICAL EXAM:  GENERAL: well-appearing, NAD  EYES: clear conjunctiva  ENMT: Moist mucous membranes  NECK: Supple, No JVD, Normal thyroid  CHEST/LUNG: Clear to auscultation bilaterally; No rales, rhonchi, wheezing, or rubs  HEART: S1, S2, Regular rate and rhythm  ABDOMEN: Soft, Nontender, Nondistended; Bowel sounds present  : +small right perineal lump, no tenderness, erythema or drainage present  NEURO: Alert & Oriented X3  EXTREMITIES: No LE edema, no calf tenderness  LYMPH: No lymphadenopathy noted  SKIN: No rashes or lesions    Consultant(s) Notes Reviewed:  [x ] YES  [ ] NO  Care Discussed with Consultants/Other Providers [ x] YES  [ ] NO    LABS:            CAPILLARY BLOOD GLUCOSE      POCT Blood Glucose.: 124 mg/dL (03 Nov 2022 16:28)  POCT Blood Glucose.: 111 mg/dL (03 Nov 2022 11:27)  POCT Blood Glucose.: 97 mg/dL (03 Nov 2022 07:52)  POCT Blood Glucose.: 121 mg/dL (02 Nov 2022 21:31)  POCT Blood Glucose.: 107 mg/dL (02 Nov 2022 16:58)            RADIOLOGY & ADDITIONAL TESTS:    Imaging Personally Reviewed:  [ ] YES  [ ] NO

## 2022-11-03 NOTE — PROGRESS NOTE ADULT - SUBJECTIVE AND OBJECTIVE BOX
INTERVAL HPI/OVERNIGHT EVENTS:  Patient seen,stable ,no acute issues  VITAL SIGNS:  T(F): 98 (11-03-22 @ 11:45)  HR: 55 (11-03-22 @ 11:45)  BP: 129/88 (11-03-22 @ 11:45)  RR: 17 (11-03-22 @ 11:45)  SpO2: 94% (11-03-22 @ 11:45)  Wt(kg): --    PHYSICAL EXAM:  awake  Constitutional:  Eyes:  ENMT:perrla  Neck:  Respiratory:clear  Cardiovascular:s1s2,m-none  Gastrointestinal:soft,bs pos  Extremities:  Vascular:  Neurological:no focal deficit  Musculoskeletal:    MEDICATIONS  (STANDING):  ceFAZolin   IVPB 1000 milliGRAM(s) IV Intermittent every 8 hours  chlorhexidine 2% Cloths 1 Application(s) Topical <User Schedule>  dextrose 5%. 1000 milliLiter(s) (50 mL/Hr) IV Continuous <Continuous>  dextrose 5%. 1000 milliLiter(s) (100 mL/Hr) IV Continuous <Continuous>  dextrose 50% Injectable 25 Gram(s) IV Push once  dextrose 50% Injectable 12.5 Gram(s) IV Push once  dextrose 50% Injectable 25 Gram(s) IV Push once  enoxaparin Injectable 40 milliGRAM(s) SubCutaneous every 24 hours  gabapentin 100 milliGRAM(s) Oral two times a day  glucagon  Injectable 1 milliGRAM(s) IntraMuscular once  hydrochlorothiazide 25 milliGRAM(s) Oral daily  insulin lispro (ADMELOG) corrective regimen sliding scale   SubCutaneous three times a day before meals  insulin lispro (ADMELOG) corrective regimen sliding scale   SubCutaneous at bedtime  losartan 100 milliGRAM(s) Oral daily  montelukast 10 milliGRAM(s) Oral at bedtime  pantoprazole    Tablet      pantoprazole    Tablet 40 milliGRAM(s) Oral before breakfast  propranolol  milliGRAM(s) Oral daily  sodium chloride 0.9%. 1000 milliLiter(s) (80 mL/Hr) IV Continuous <Continuous>    MEDICATIONS  (PRN):  acetaminophen     Tablet .. 650 milliGRAM(s) Oral every 6 hours PRN Temp greater or equal to 38C (100.4F), Mild Pain (1 - 3)  acetaminophen 300 mG/butalbital 50 mG/ caffeine 40 mG 1 Capsule(s) Oral every 6 hours PRN migraine headache  aluminum hydroxide/magnesium hydroxide/simethicone Suspension 30 milliLiter(s) Oral every 4 hours PRN Dyspepsia  dextrose Oral Gel 15 Gram(s) Oral once PRN Blood Glucose LESS THAN 70 milliGRAM(s)/deciliter  sodium chloride 0.9% lock flush 10 milliLiter(s) IV Push every 1 hour PRN Pre/post blood products, medications, blood draw, and to maintain line patency      Allergies    codeine (Rash)    Intolerances        LABS:                RADIOLOGY & ADDITIONAL TESTS:      Assessment and Plan:   · Assessment	  76 year old female from home with PMH of HTN, DM, Patient had an I/D on 10/1 in the ED and was discharged on keflex. Presents with worsening purulent discharge from right perineal abscess, later found to + MSSA Bacteremia. SX and ID Braxton followed, no acute sx intervention needed at this time. Started on IV Ancef, repeat CX negative. S/P PICC placement, VERONICA no evidence endocarditis. Patient unable to afford outpatient IV home antibiotics and no financial support at home. Will remain inpatient for duration of IV antibiotic course until 11/6/22.      Nutritional Assessment:  · Nutritional Assessment	Diet, Consistent Carbohydrate w/Evening Snack (10-07-22 @ 09:13) [Active]     Problem/Plan - 1:  ·  Problem: MSSA bacteremia.   ·  Plan: Secondary perineal Abscess  PICC placed for ABX  Continue Ancef 1g iv q8 until 11/6  TOMMY Vernon, following.     Problem/Plan - 2:  ·  Problem: Perineal abscess.   ·  Plan: S/p I/D on 10/1   Continue daily packing   Continue antibiotics   TOMMY Vernon, following  Recommendations appreciated.     Problem/Plan - 3:  ·  Problem: Exposure to COVID-19 virus.   ·  Plan: exposed on 10/27  asymptomatic  repeat covid on 11/1 negative  continue droplet iso.     Problem/Plan - 4:  ·  Problem: HTN (hypertension).   ·  Plan: Controlled   Continue HCTZ 25 mg, Losartan 100 mg and propanolol 160 mg daily with parameters  Monitor BP.     Problem/Plan - 5:  ·  Problem: Diabetes.   ·  Plan: Controlled  Continue sliding scale insulin coverage  Continue glucose monitoring  Continue low carb diet.     Problem/Plan - 6:  ·  Problem: DVT prophylaxis.   ·  Plan: DVT PPX: Lovenox   GI PPX: PPI.     Problem/Plan - 7:  ·  Problem: Discharge planning issues.   ·  Plan: Pt needs IV antibiotics until 11/6/22 and cannot afford services at home.  Pt will be stable for d/c s/p 11/6 ABX dose  Care management following for discharge planning.

## 2022-11-04 LAB
GLUCOSE BLDC GLUCOMTR-MCNC: 109 MG/DL — HIGH (ref 70–99)
GLUCOSE BLDC GLUCOMTR-MCNC: 113 MG/DL — HIGH (ref 70–99)
GLUCOSE BLDC GLUCOMTR-MCNC: 122 MG/DL — HIGH (ref 70–99)
GLUCOSE BLDC GLUCOMTR-MCNC: 131 MG/DL — HIGH (ref 70–99)

## 2022-11-04 RX ORDER — ACETAMINOPHEN 500 MG
2 TABLET ORAL
Qty: 0 | Refills: 0 | DISCHARGE
Start: 2022-11-04

## 2022-11-04 RX ADMIN — Medication 1 CAPSULE(S): at 17:06

## 2022-11-04 RX ADMIN — LOSARTAN POTASSIUM 100 MILLIGRAM(S): 100 TABLET, FILM COATED ORAL at 05:56

## 2022-11-04 RX ADMIN — Medication 100 MILLIGRAM(S): at 05:55

## 2022-11-04 RX ADMIN — Medication 25 MILLIGRAM(S): at 05:56

## 2022-11-04 RX ADMIN — ENOXAPARIN SODIUM 40 MILLIGRAM(S): 100 INJECTION SUBCUTANEOUS at 05:56

## 2022-11-04 RX ADMIN — MONTELUKAST 10 MILLIGRAM(S): 4 TABLET, CHEWABLE ORAL at 21:55

## 2022-11-04 RX ADMIN — Medication 650 MILLIGRAM(S): at 23:00

## 2022-11-04 RX ADMIN — Medication 160 MILLIGRAM(S): at 05:56

## 2022-11-04 RX ADMIN — Medication 1 CAPSULE(S): at 16:36

## 2022-11-04 RX ADMIN — CHLORHEXIDINE GLUCONATE 1 APPLICATION(S): 213 SOLUTION TOPICAL at 05:57

## 2022-11-04 RX ADMIN — Medication 100 MILLIGRAM(S): at 21:55

## 2022-11-04 RX ADMIN — GABAPENTIN 100 MILLIGRAM(S): 400 CAPSULE ORAL at 05:59

## 2022-11-04 RX ADMIN — PANTOPRAZOLE SODIUM 40 MILLIGRAM(S): 20 TABLET, DELAYED RELEASE ORAL at 06:00

## 2022-11-04 RX ADMIN — GABAPENTIN 100 MILLIGRAM(S): 400 CAPSULE ORAL at 18:07

## 2022-11-04 RX ADMIN — Medication 100 MILLIGRAM(S): at 15:01

## 2022-11-04 RX ADMIN — Medication 650 MILLIGRAM(S): at 22:39

## 2022-11-04 NOTE — PROGRESS NOTE ADULT - PROBLEM SELECTOR PLAN 1
Secondary perineal Abscess  S/P I&D 10/1: Initial culture positive staph aureus  Repeat Blood culture negative   VERONICA neg for endocarditis  PICC placed for ABX  Continue Ancef 1g iv q8 until 11/6  Dr. Limon, ID, following  Recommendations appreciated - P/w purulent perineal abscess  - S/P I&D 10/1: Initial culture positive staph aureus  - Repeat Blood culture negative   - VERONICA neg for endocarditis  - Left arm PICC placed for long term abx   - C/w Cefazolin 1g IV q8 until 11/6  - ID-Dr. Limon consulted, appreciated

## 2022-11-04 NOTE — PROGRESS NOTE ADULT - SUBJECTIVE AND OBJECTIVE BOX
NP Note discussed with  primary attending    Patient is a 76y old  Female who presents with a chief complaint of perineal abscess s/p I&D with MSSA bacteremia (03 Nov 2022 16:49)      INTERVAL HPI/OVERNIGHT EVENTS: Denies new complaints or concerns.      MEDICATIONS  (STANDING):  ceFAZolin   IVPB 1000 milliGRAM(s) IV Intermittent every 8 hours  chlorhexidine 2% Cloths 1 Application(s) Topical <User Schedule>  dextrose 5%. 1000 milliLiter(s) (100 mL/Hr) IV Continuous <Continuous>  dextrose 5%. 1000 milliLiter(s) (50 mL/Hr) IV Continuous <Continuous>  dextrose 50% Injectable 25 Gram(s) IV Push once  dextrose 50% Injectable 12.5 Gram(s) IV Push once  dextrose 50% Injectable 25 Gram(s) IV Push once  enoxaparin Injectable 40 milliGRAM(s) SubCutaneous every 24 hours  gabapentin 100 milliGRAM(s) Oral two times a day  glucagon  Injectable 1 milliGRAM(s) IntraMuscular once  hydrochlorothiazide 25 milliGRAM(s) Oral daily  insulin lispro (ADMELOG) corrective regimen sliding scale   SubCutaneous three times a day before meals  insulin lispro (ADMELOG) corrective regimen sliding scale   SubCutaneous at bedtime  losartan 100 milliGRAM(s) Oral daily  montelukast 10 milliGRAM(s) Oral at bedtime  pantoprazole    Tablet 40 milliGRAM(s) Oral before breakfast  pantoprazole    Tablet      propranolol  milliGRAM(s) Oral daily  sodium chloride 0.9%. 1000 milliLiter(s) (80 mL/Hr) IV Continuous <Continuous>    MEDICATIONS  (PRN):  acetaminophen     Tablet .. 650 milliGRAM(s) Oral every 6 hours PRN Temp greater or equal to 38C (100.4F), Mild Pain (1 - 3)  acetaminophen 300 mG/butalbital 50 mG/ caffeine 40 mG 1 Capsule(s) Oral every 6 hours PRN migraine headache  aluminum hydroxide/magnesium hydroxide/simethicone Suspension 30 milliLiter(s) Oral every 4 hours PRN Dyspepsia  dextrose Oral Gel 15 Gram(s) Oral once PRN Blood Glucose LESS THAN 70 milliGRAM(s)/deciliter  sodium chloride 0.9% lock flush 10 milliLiter(s) IV Push every 1 hour PRN Pre/post blood products, medications, blood draw, and to maintain line patency      __________________________________________________  REVIEW OF SYSTEMS:    CONSTITUTIONAL: No fever  EYES: No acute visual disturbances  NECK: No pain or stiffness  RESPIRATORY: No cough; No shortness of breath  CARDIOVASCULAR: No chest pain, no palpitations  GASTROINTESTINAL: No pain. No nausea or vomiting.  No diarrhea   NEUROLOGICAL: No headache or numbness, no tremors  MUSCULOSKELETAL: No joint pain, no muscle pain  GENITOURINARY: No dysuria, no frequency, no hesitancy  PSYCHIATRY: No depression , no anxiety  ALL OTHER  ROS negative        Vital Signs Last 24 Hrs  T(C): 36.6 (04 Nov 2022 05:49), Max: 37.1 (03 Nov 2022 20:24)  T(F): 97.9 (04 Nov 2022 05:49), Max: 98.7 (03 Nov 2022 20:24)  HR: 58 (04 Nov 2022 05:49) (58 - 58)  BP: 169/86 (04 Nov 2022 05:49) (163/98 - 169/86)  RR: 18 (04 Nov 2022 05:49) (18 - 18)  SpO2: 94% (04 Nov 2022 05:49) (94% - 96%)    Parameters below as of 04 Nov 2022 05:49  Patient On (Oxygen Delivery Method): room air        ________________________________________________  PHYSICAL EXAM:  GENERAL: NAD  HEENT: Normocephalic;  conjunctivae and sclerae clear; moist mucous membranes   NECK : Supple  CHEST/LUNG: Clear to auscultation bilaterally with good air entry   HEART: S1 S2  regular; no murmurs, gallops or rubs  ABDOMEN: Soft, Nontender, Nondistended; Bowel sounds present x 4 quad  GENITOURINARY: (+) resolving perineal abscess   EXTREMITIES: No cyanosis; no edema; no calf tenderness  SKIN: Warm and dry; no rash  NERVOUS SYSTEM:  Awake and alert; Oriented to place, person and time; no new deficits    _________________________________________________  LABS:              CAPILLARY BLOOD GLUCOSE      POCT Blood Glucose.: 109 mg/dL (04 Nov 2022 11:19)  POCT Blood Glucose.: 122 mg/dL (04 Nov 2022 07:34)  POCT Blood Glucose.: 114 mg/dL (03 Nov 2022 21:38)  POCT Blood Glucose.: 124 mg/dL (03 Nov 2022 16:28)        RADIOLOGY & ADDITIONAL TESTS:    Imaging Personally Reviewed:  YES/NO    Consultant(s) Notes Reviewed:   YES/ No    Care Discussed with Consultants :     Plan of care was discussed with patient and /or primary care giver; all questions and concerns were addressed and care was aligned with patient's wishes.     NP Note discussed with  primary attending    Patient is a 76y old  Female who presents with a chief complaint of perineal abscess s/p I&D with MSSA bacteremia (03 Nov 2022 16:49)      INTERVAL HPI/OVERNIGHT EVENTS: Denies new complaints or concerns.      MEDICATIONS  (STANDING):  ceFAZolin   IVPB 1000 milliGRAM(s) IV Intermittent every 8 hours  chlorhexidine 2% Cloths 1 Application(s) Topical <User Schedule>  dextrose 5%. 1000 milliLiter(s) (100 mL/Hr) IV Continuous <Continuous>  dextrose 5%. 1000 milliLiter(s) (50 mL/Hr) IV Continuous <Continuous>  dextrose 50% Injectable 25 Gram(s) IV Push once  dextrose 50% Injectable 12.5 Gram(s) IV Push once  dextrose 50% Injectable 25 Gram(s) IV Push once  enoxaparin Injectable 40 milliGRAM(s) SubCutaneous every 24 hours  gabapentin 100 milliGRAM(s) Oral two times a day  glucagon  Injectable 1 milliGRAM(s) IntraMuscular once  hydrochlorothiazide 25 milliGRAM(s) Oral daily  insulin lispro (ADMELOG) corrective regimen sliding scale   SubCutaneous three times a day before meals  insulin lispro (ADMELOG) corrective regimen sliding scale   SubCutaneous at bedtime  losartan 100 milliGRAM(s) Oral daily  montelukast 10 milliGRAM(s) Oral at bedtime  pantoprazole    Tablet 40 milliGRAM(s) Oral before breakfast  pantoprazole    Tablet      propranolol  milliGRAM(s) Oral daily  sodium chloride 0.9%. 1000 milliLiter(s) (80 mL/Hr) IV Continuous <Continuous>    MEDICATIONS  (PRN):  acetaminophen     Tablet .. 650 milliGRAM(s) Oral every 6 hours PRN Temp greater or equal to 38C (100.4F), Mild Pain (1 - 3)  acetaminophen 300 mG/butalbital 50 mG/ caffeine 40 mG 1 Capsule(s) Oral every 6 hours PRN migraine headache  aluminum hydroxide/magnesium hydroxide/simethicone Suspension 30 milliLiter(s) Oral every 4 hours PRN Dyspepsia  dextrose Oral Gel 15 Gram(s) Oral once PRN Blood Glucose LESS THAN 70 milliGRAM(s)/deciliter  sodium chloride 0.9% lock flush 10 milliLiter(s) IV Push every 1 hour PRN Pre/post blood products, medications, blood draw, and to maintain line patency      __________________________________________________  REVIEW OF SYSTEMS:    CONSTITUTIONAL: No fever  EYES: No acute visual disturbances  NECK: No pain or stiffness  RESPIRATORY: No cough; No shortness of breath  CARDIOVASCULAR: No chest pain, no palpitations  GASTROINTESTINAL: No pain. No nausea or vomiting.  No diarrhea   NEUROLOGICAL: No headache or numbness, no tremors  MUSCULOSKELETAL: No joint pain, no muscle pain  GENITOURINARY: No dysuria, no frequency, no hesitancy  PSYCHIATRY: No depression , no anxiety  ALL OTHER  ROS negative        Vital Signs Last 24 Hrs  T(C): 36.6 (04 Nov 2022 05:49), Max: 37.1 (03 Nov 2022 20:24)  T(F): 97.9 (04 Nov 2022 05:49), Max: 98.7 (03 Nov 2022 20:24)  HR: 58 (04 Nov 2022 05:49) (58 - 58)  BP: 169/86 (04 Nov 2022 05:49) (163/98 - 169/86)  RR: 18 (04 Nov 2022 05:49) (18 - 18)  SpO2: 94% (04 Nov 2022 05:49) (94% - 96%)    Parameters below as of 04 Nov 2022 05:49  Patient On (Oxygen Delivery Method): room air        ________________________________________________  PHYSICAL EXAM:  GENERAL: NAD  HEENT: Normocephalic;  conjunctivae and sclerae clear; moist mucous membranes   NECK : Supple  CHEST/LUNG: Clear to auscultation bilaterally with good air entry   HEART: S1 S2  regular; no murmurs, gallops or rubs  ABDOMEN: Soft, Nontender, Nondistended; Bowel sounds present x 4 quad  GENITOURINARY: (+) resolving perineal abscess   EXTREMITIES: No cyanosis; no edema; no calf tenderness.  (+) Left arm PICC   SKIN: Warm and dry; no rash  NERVOUS SYSTEM:  Awake and alert; Oriented to place, person and time; no new deficits    _________________________________________________  LABS:              CAPILLARY BLOOD GLUCOSE      POCT Blood Glucose.: 109 mg/dL (04 Nov 2022 11:19)  POCT Blood Glucose.: 122 mg/dL (04 Nov 2022 07:34)  POCT Blood Glucose.: 114 mg/dL (03 Nov 2022 21:38)  POCT Blood Glucose.: 124 mg/dL (03 Nov 2022 16:28)        RADIOLOGY & ADDITIONAL TESTS:    Imaging Personally Reviewed:  YES    Consultant(s) Notes Reviewed:   YES    Care Discussed with Consultants :     Plan of care was discussed with patient and /or primary care giver; all questions and concerns were addressed and care was aligned with patient's wishes.

## 2022-11-04 NOTE — PROGRESS NOTE ADULT - NUTRITIONAL ASSESSMENT
Diet, Consistent Carbohydrate w/Evening Snack (10-07-22 @ 09:13) [Active]

## 2022-11-04 NOTE — PROGRESS NOTE ADULT - PROBLEM SELECTOR PROBLEM 3
HTN (hypertension)
Exposure to COVID-19 virus
HTN (hypertension)
Exposure to COVID-19 virus
Exposure to COVID-19 virus
HTN (hypertension)
Exposure to COVID-19 virus
Exposure to COVID-19 virus

## 2022-11-04 NOTE — PROGRESS NOTE ADULT - PROBLEM SELECTOR PROBLEM 4
Diabetes
HTN (hypertension)
Diabetes
HTN (hypertension)
Diabetes
HTN (hypertension)
HTN (hypertension)
Diabetes
HTN (hypertension)

## 2022-11-04 NOTE — PROGRESS NOTE ADULT - PROBLEM SELECTOR PROBLEM 1
MSSA bacteremia
Perineal abscess
MSSA bacteremia

## 2022-11-04 NOTE — PROGRESS NOTE ADULT - PROBLEM SELECTOR PROBLEM 2
Perineal abscess
MSSA bacteremia
Perineal abscess

## 2022-11-04 NOTE — PROGRESS NOTE ADULT - PROBLEM SELECTOR PLAN 7
Pt needs IV antibiotics until 11/6/22 and cannot afford services at home.  Pt will be stable for d/c s/p 11/6 ABX dose  next covid test 11/6  Care management following for discharge planning - Pt needs IV antibiotics until 11/6/22 and cannot afford services at home.  - Next COVID test on 11/6  - CM following

## 2022-11-04 NOTE — PROGRESS NOTE ADULT - ATTENDING COMMENTS
d/w patient and staff
d/w patient and staff
d/w patient and staff plan of care
d/w patient and staff

## 2022-11-04 NOTE — PROGRESS NOTE ADULT - PROBLEM SELECTOR PLAN 5
on home med metFORMIN 500 mg oral tablet: 1 tab(s) orally 2 times a day  Controlled  Continue sliding scale insulin coverage  Continue glucose monitoring  Continue low carb diet H/o DM on Metformin 500 mg oral tablet: 1 tab(s) orally 2 times a day  - Controlled  - Continue to monitor FS  - C/w HSS

## 2022-11-04 NOTE — PROGRESS NOTE ADULT - PROBLEM SELECTOR PLAN 2
S/p I/D on 10/1   CT A/P noted above  Surg recs no surgical intervention   Continue daily packing   Continue antibiotics   Continue chlorhexidine wipes daily  Dr. Limon, ID, following  Recommendations appreciated - S/p I/D on 10/1   - S/p CT A/P   - Sx consulted and no surgical intervention at this time   - C/w daily packing   - C/w Cefazolin 1g IV q8 until 11/6  - C/w Chlorhexidine wipes daily  - ID-Dr. Limon consulted, appreciated

## 2022-11-04 NOTE — PROGRESS NOTE ADULT - PROBLEM SELECTOR PLAN 4
Controlled   Continue HCTZ 25 mg, Losartan 100 mg and propanolol 160 mg daily with parameters  Monitor BP - Stable   - C/w HTCZ, Losartan, and Propranolol  - Continue monitor BP

## 2022-11-04 NOTE — PROGRESS NOTE ADULT - ASSESSMENT
76 year old female from home with PMH of HTN, DM.  Recently seen in [facility] ED and had an I/D on 10/1 in the ED and was discharged on keflex. Presents with worsening purulent discharge from right perineal abscess, later found to + MSSA Bacteremia. SX and ID Braxton followed, no acute sx intervention needed at this time. Started on IV Ancef, repeat CX negative. S/P PICC placement, VERONICA no evidence endocarditis. Patient unable to afford outpatient IV home antibiotics and no financial support at home. Will remain inpatient for duration of IV antibiotic course until 11/6/22. Pt was exposed to covid-19 again on 11/1, also pending repeat covid test on day 5 (11/6).  76 year old female from home with PMH of HTN, DM.  Recently seen in [facility] ED, had a right lower buttock I/D on 10/1 and discharged home on Keflex.   Patient represented to the ED with worsening purulent discharge from right perineal abscess, found to have MSSA Bacteremia.    SX and ID Braxton followed, no acute sx intervention needed at this time. Started on IV Ancef.    Repeat bld cx negative.  S/P left arm PICC placement.      VERONICA no evidence endocarditis. Patient unable to afford outpatient IV home antibiotics and no financial support at home.  Patient will remain inpatient for duration of IV antibiotic course until 11/6/22.     Hospital course complicated by COVID exposure x 2 on 10/27 & 11/1.   Pending repeat COVID test on day 5 (11/6).  76 year old female from home with PMH of HTN, DM.  Recently seen in [facility] ED, had a right lower buttock I/D on 10/1 and discharged home on Keflex.   Patient represented to the ED with worsening purulent discharge from right perineal abscess, found to have MSSA Bacteremia.    Sx followed, no acute sx intervention needed at this time.   ID Braxton followed, started on IV Ancef.    Repeat bld cx negative.  S/P left arm PICC placement.      VERONICA no evidence endocarditis. Patient unable to afford outpatient IV home antibiotics and no financial support at home.  Patient will remain inpatient for duration of IV antibiotic course until 11/6/22.     Hospital course complicated by COVID exposure x 2 on 10/27 & 11/1.   Pending repeat COVID test on day 5 (11/6).

## 2022-11-04 NOTE — PROGRESS NOTE ADULT - PROBLEM SELECTOR PLAN 3
exposed on 10/27 and again on 11/1  asymptomatic  repeat covid on 11/1 negative  next covid test on 11/6  continue droplet iso - Twice exposed on 10/27 and again on 11/1  - Currently asymptomatic  - Next COVID test on 11/6  - Maintain isolation

## 2022-11-05 LAB
GLUCOSE BLDC GLUCOMTR-MCNC: 101 MG/DL — HIGH (ref 70–99)
GLUCOSE BLDC GLUCOMTR-MCNC: 101 MG/DL — HIGH (ref 70–99)
GLUCOSE BLDC GLUCOMTR-MCNC: 103 MG/DL — HIGH (ref 70–99)
GLUCOSE BLDC GLUCOMTR-MCNC: 90 MG/DL — SIGNIFICANT CHANGE UP (ref 70–99)

## 2022-11-05 RX ORDER — CEFAZOLIN SODIUM 1 G
1000 VIAL (EA) INJECTION ONCE
Refills: 0 | Status: COMPLETED | OUTPATIENT
Start: 2022-11-05 | End: 2022-11-05

## 2022-11-05 RX ADMIN — GABAPENTIN 100 MILLIGRAM(S): 400 CAPSULE ORAL at 05:46

## 2022-11-05 RX ADMIN — CHLORHEXIDINE GLUCONATE 1 APPLICATION(S): 213 SOLUTION TOPICAL at 05:46

## 2022-11-05 RX ADMIN — LOSARTAN POTASSIUM 100 MILLIGRAM(S): 100 TABLET, FILM COATED ORAL at 05:48

## 2022-11-05 RX ADMIN — Medication 100 MILLIGRAM(S): at 12:35

## 2022-11-05 RX ADMIN — Medication 160 MILLIGRAM(S): at 05:48

## 2022-11-05 RX ADMIN — ENOXAPARIN SODIUM 40 MILLIGRAM(S): 100 INJECTION SUBCUTANEOUS at 05:47

## 2022-11-05 RX ADMIN — Medication 25 MILLIGRAM(S): at 05:46

## 2022-11-05 RX ADMIN — MONTELUKAST 10 MILLIGRAM(S): 4 TABLET, CHEWABLE ORAL at 21:25

## 2022-11-05 RX ADMIN — Medication 100 MILLIGRAM(S): at 21:26

## 2022-11-05 RX ADMIN — PANTOPRAZOLE SODIUM 40 MILLIGRAM(S): 20 TABLET, DELAYED RELEASE ORAL at 05:48

## 2022-11-05 RX ADMIN — GABAPENTIN 100 MILLIGRAM(S): 400 CAPSULE ORAL at 17:58

## 2022-11-05 RX ADMIN — Medication 100 MILLIGRAM(S): at 05:45

## 2022-11-05 NOTE — PROGRESS NOTE ADULT - SUBJECTIVE AND OBJECTIVE BOX
INTERVAL HPI/OVERNIGHT EVENTS:  Patient seen,stable ,no acute events  VITAL SIGNS:  T(F): 97.3 (11-05-22 @ 05:30)  HR: 55 (11-05-22 @ 05:30)  BP: 174/90 (11-05-22 @ 05:30)  RR: 18 (11-05-22 @ 05:30)  SpO2: 98% (11-05-22 @ 05:30)  Wt(kg): --    PHYSICAL EXAM:  awake  Constitutional:  Eyes:  ENMT:perrla  Neck:  Respiratory:clear  Cardiovascular:s12s,m-none  Gastrointestinal:soft,bs pos  Extremities:  Vascular:  Neurological:no focal deficit  Musculoskeletal:    MEDICATIONS  (STANDING):  ceFAZolin   IVPB 1000 milliGRAM(s) IV Intermittent every 8 hours  chlorhexidine 2% Cloths 1 Application(s) Topical <User Schedule>  dextrose 5%. 1000 milliLiter(s) (100 mL/Hr) IV Continuous <Continuous>  dextrose 5%. 1000 milliLiter(s) (50 mL/Hr) IV Continuous <Continuous>  dextrose 50% Injectable 25 Gram(s) IV Push once  dextrose 50% Injectable 12.5 Gram(s) IV Push once  dextrose 50% Injectable 25 Gram(s) IV Push once  enoxaparin Injectable 40 milliGRAM(s) SubCutaneous every 24 hours  gabapentin 100 milliGRAM(s) Oral two times a day  glucagon  Injectable 1 milliGRAM(s) IntraMuscular once  hydrochlorothiazide 25 milliGRAM(s) Oral daily  insulin lispro (ADMELOG) corrective regimen sliding scale   SubCutaneous at bedtime  insulin lispro (ADMELOG) corrective regimen sliding scale   SubCutaneous three times a day before meals  losartan 100 milliGRAM(s) Oral daily  montelukast 10 milliGRAM(s) Oral at bedtime  pantoprazole    Tablet      pantoprazole    Tablet 40 milliGRAM(s) Oral before breakfast  propranolol  milliGRAM(s) Oral daily  sodium chloride 0.9%. 1000 milliLiter(s) (80 mL/Hr) IV Continuous <Continuous>    MEDICATIONS  (PRN):  acetaminophen     Tablet .. 650 milliGRAM(s) Oral every 6 hours PRN Temp greater or equal to 38C (100.4F), Mild Pain (1 - 3)  acetaminophen 300 mG/butalbital 50 mG/ caffeine 40 mG 1 Capsule(s) Oral every 6 hours PRN migraine headache  aluminum hydroxide/magnesium hydroxide/simethicone Suspension 30 milliLiter(s) Oral every 4 hours PRN Dyspepsia  dextrose Oral Gel 15 Gram(s) Oral once PRN Blood Glucose LESS THAN 70 milliGRAM(s)/deciliter  sodium chloride 0.9% lock flush 10 milliLiter(s) IV Push every 1 hour PRN Pre/post blood products, medications, blood draw, and to maintain line patency      Allergies    codeine (Rash)    Intolerances      Assessment and Plan:   · Assessment	  76 year old female from home with PMH of HTN, DM.  Recently seen in [facility] ED, had a right lower buttock I/D on 10/1 and discharged home on Keflex.   Patient represented to the ED with worsening purulent discharge from right perineal abscess, found to have MSSA Bacteremia.    Sx followed, no acute sx intervention needed at this time.   ID Braxton followed, started on IV Ancef.    Repeat bld cx negative.  S/P left arm PICC placement.      VERONICA no evidence endocarditis. Patient unable to afford outpatient IV home antibiotics and no financial support at home.  Patient will remain inpatient for duration of IV antibiotic course until 11/6/22.     Hospital course complicated by COVID exposure x 2 on 10/27 & 11/1.   Pending repeat COVID test on day 5 (11/6).      Nutritional Assessment:  · Nutritional Assessment	Diet, Consistent Carbohydrate w/Evening Snack (10-07-22 @ 09:13) [Active]     Problem/Plan - 1:  ·  Problem: MSSA bacteremia.   ·  Plan: - P/w purulent perineal abscess  - S/P I&D 10/1: Initial culture positive staph aureus  - Repeat Blood culture negative   - VERONICA neg for endocarditis  - Left arm PICC placed for long term abx   - C/w Cefazolin 1g IV q8 until 11/6  - ID-Dr. Limon consulted, appreciated.  -d/c home once completed abx     Problem/Plan - 2:  ·  Problem: Perineal abscess.   ·  Plan: - S/p I/D on 10/1   - S/p CT A/P   - Sx consulted and no surgical intervention at this time   - C/w daily packing   - C/w Cefazolin 1g IV q8 until 11/6  - C/w Chlorhexidine wipes daily  - ID-Dr. Limon consulted, appreciated.     Problem/Plan - 3:  ·  Problem: Exposure to COVID-19 virus.   ·  Plan: - Twice exposed on 10/27 and again on 11/1  - Currently asymptomatic  - Next COVID test on 11/6  - Maintain isolation.     Problem/Plan - 4:  ·  Problem: HTN (hypertension).   ·  Plan: - Stable   - C/w HTCZ, Losartan, and Propranolol  - Continue monitor BP.     Problem/Plan - 5:  ·  Problem: Diabetes.   ·  Plan: H/o DM on Metformin 500 mg oral tablet: 1 tab(s) orally 2 times a day  - Controlled  - Continue to monitor FS  - C/w HSS.     Problem/Plan - 6:  ·  Problem: DVT prophylaxis.   ·  Plan: - C/w Lovenox for DVT ppx   - C/w Protonix for GI ppx.     Problem/Plan - 7:  ·  Problem: Discharge planning issues.   ·  Plan: - Pt needs IV antibiotics until 11/6/22 and cannot afford services at home.  - Next COVID test on 11/6  - CM following.

## 2022-11-05 NOTE — PROGRESS NOTE ADULT - ASSESSMENT
Right buttock abscess - s/p I&D   MSSA bacteremia - repeat BC are negative     Plan:   ·	continue Ancef 1g iv q8h until 11/06  ·	will need her PICC line removed once she completes her treatment tomorrow evening

## 2022-11-05 NOTE — PROGRESS NOTE ADULT - SUBJECTIVE AND OBJECTIVE BOX
76y Female is under our care for right buttock abscess (s/p I&D) and MSSA bacteremia. Patient is in same disposition with no overnight events. Remains afebrile with normal wbc count.     MEDS:  ceFAZolin   IVPB 1000 milliGRAM(s) IV Intermittent every 8 hours    ALLERGIES: Allergies    codeine (Rash)    Intolerances    REVIEW OF SYSTEMS:  [  ] Not able to illicit  General: no fevers no malaise  Chest: no cough no sob  GI: no nvd  Skin: no rashes  Musculoskeletal: no trauma no LBP  Neuro: no ha's no dizziness     VITALS:  Vital Signs Last 24 Hrs  T(C): 36.3 (11-05-22 @ 13:49), Max: 36.9 (11-04-22 @ 20:37)  T(F): 97.4 (11-05-22 @ 13:49), Max: 98.4 (11-04-22 @ 20:37)  HR: 56 (11-05-22 @ 13:49) (55 - 56)  BP: 125/85 (11-05-22 @ 13:49) (125/85 - 174/90)  BP(mean): 112 (11-05-22 @ 05:30) (89 - 112)  RR: 16 (11-05-22 @ 13:49) (16 - 18)  SpO2: 98% (11-05-22 @ 13:49) (96% - 98%)    PHYSICAL EXAM:  HEENT: n/a  Neck: supple no LN's   Respiratory: lungs clear no rales  Cardiovascular: S1 S2 reg no murmurs  Gastrointestinal: +BS with soft, nondistended abdomen; nontender  Extremities: no edema, +left arm PICC line  Skin: no rashes  Ortho: n/a  Neuro: awake and alert    LABS/DIAGNOSTIC TESTS:    No new labs          CULTURES:   .Blood Blood-Peripheral  10-09 @ 06:24   No Growth Final  --  --      .Blood Blood-Peripheral  10-09 @ 06:02   No Growth Final  --  --      .Blood Blood-Peripheral  10-06 @ 12:00   Growth in aerobic and anaerobic bottles: Staphylococcus aureus  See previous culture  [id number]  --  Blood Culture PCR  Staphylococcus aureus      .Abscess Leg - Right  10-01 @ 13:10   Numerous Staphylococcus aureus  --  Staphylococcus aureus      RADIOLOGY:  no new studies

## 2022-11-05 NOTE — PROGRESS NOTE ADULT - REASON FOR ADMISSION
MSSA Bacteremia
abscess
bacteriemia
perineal abscess s/p I&D with MSSA bacteremia
abscess
abscess
bacteriemia
perineal abscess s/p I&D with MSSA bacteremia
Perineal abscess
abscess
abscess
worsening perineal abscess
Bacteremia
MSSA Bacteremia
perineal abscess s/p I&D with MSSA bacteremia
Perineal abscess
perineal abscess s/p I&D with MSSA bacteremia
Perineal abscess
MSSA Bacteremia

## 2022-11-06 LAB
GLUCOSE BLDC GLUCOMTR-MCNC: 105 MG/DL — HIGH (ref 70–99)
GLUCOSE BLDC GLUCOMTR-MCNC: 106 MG/DL — HIGH (ref 70–99)
GLUCOSE BLDC GLUCOMTR-MCNC: 113 MG/DL — HIGH (ref 70–99)
GLUCOSE BLDC GLUCOMTR-MCNC: 98 MG/DL — SIGNIFICANT CHANGE UP (ref 70–99)
SARS-COV-2 RNA SPEC QL NAA+PROBE: SIGNIFICANT CHANGE UP

## 2022-11-06 RX ORDER — CEFAZOLIN SODIUM 1 G
1000 VIAL (EA) INJECTION EVERY 8 HOURS
Refills: 0 | Status: COMPLETED | OUTPATIENT
Start: 2022-11-06 | End: 2022-11-06

## 2022-11-06 RX ADMIN — CHLORHEXIDINE GLUCONATE 1 APPLICATION(S): 213 SOLUTION TOPICAL at 05:38

## 2022-11-06 RX ADMIN — Medication 100 MILLIGRAM(S): at 18:41

## 2022-11-06 RX ADMIN — GABAPENTIN 100 MILLIGRAM(S): 400 CAPSULE ORAL at 18:40

## 2022-11-06 RX ADMIN — LOSARTAN POTASSIUM 100 MILLIGRAM(S): 100 TABLET, FILM COATED ORAL at 05:37

## 2022-11-06 RX ADMIN — Medication 100 MILLIGRAM(S): at 10:25

## 2022-11-06 RX ADMIN — MONTELUKAST 10 MILLIGRAM(S): 4 TABLET, CHEWABLE ORAL at 21:38

## 2022-11-06 RX ADMIN — GABAPENTIN 100 MILLIGRAM(S): 400 CAPSULE ORAL at 05:37

## 2022-11-06 RX ADMIN — ENOXAPARIN SODIUM 40 MILLIGRAM(S): 100 INJECTION SUBCUTANEOUS at 05:37

## 2022-11-06 RX ADMIN — PANTOPRAZOLE SODIUM 40 MILLIGRAM(S): 20 TABLET, DELAYED RELEASE ORAL at 05:38

## 2022-11-06 NOTE — PROGRESS NOTE ADULT - NS ATTEND AMEND GEN_ALL_CORE FT
I agree with above
Seen and examined. No new concerns. DC planing but unable to afford Abxs . Lives in [place] and visiting her.

## 2022-11-06 NOTE — PROGRESS NOTE ADULT - PROVIDER SPECIALTY LIST ADULT
Cardiology
Infectious Disease
Internal Medicine
Surgery
Cardiology
Infectious Disease
Internal Medicine
Surgery
Infectious Disease
Internal Medicine

## 2022-11-06 NOTE — PROGRESS NOTE ADULT - NS ATTEND OPT1 GEN_ALL_CORE

## 2022-11-06 NOTE — PROGRESS NOTE ADULT - SUBJECTIVE AND OBJECTIVE BOX
76y Female is under our care for right buttock abscess (s/p I&D) and MSSA bacteremia. Patient is in same disposition with no overnight events. Completes course of antibiotics tonight. Remains afebrile with normal wbc count.     MEDS:  ceFAZolin   IVPB 1000 milliGRAM(s) IV Intermittent every 8 hours    ALLERGIES: Allergies    codeine (Rash)    Intolerances    REVIEW OF SYSTEMS:  [  ] Not able to illicit  General: no fevers no malaise  Chest: no cough no sob  GI: no nvd  Skin: no rashes  Musculoskeletal: no trauma no LBP  Neuro: no ha's no dizziness     VITALS:  Vital Signs Last 24 Hrs  T(C): 36.5 (11-06-22 @ 05:39), Max: 36.7 (11-05-22 @ 20:21)  T(F): 97.7 (11-06-22 @ 05:39), Max: 98 (11-05-22 @ 20:21)  HR: 51 (11-06-22 @ 05:39) (51 - 59)  BP: 150/90 (11-06-22 @ 05:39) (144/77 - 150/90)  BP(mean): --  RR: 17 (11-06-22 @ 05:39) (17 - 17)  SpO2: 98% (11-06-22 @ 05:39) (98% - 100%)    PHYSICAL EXAM:  HEENT: n/a  Neck: supple no LN's   Respiratory: lungs clear no rales  Cardiovascular: S1 S2 reg no murmurs  Gastrointestinal: +BS with soft, nondistended abdomen; nontender  Extremities: no edema, +left arm PICC line  Skin: no rashes  Ortho: n/a  Neuro: awake and alert    LABS/DIAGNOSTIC TESTS:    No new labs          CULTURES:   .Blood Blood-Peripheral  10-09 @ 06:24   No Growth Final  --  --      .Blood Blood-Peripheral  10-09 @ 06:02   No Growth Final  --  --      .Blood Blood-Peripheral  10-06 @ 12:00   Growth in aerobic and anaerobic bottles: Staphylococcus aureus  See previous culture  [id number]  --  Blood Culture PCR  Staphylococcus aureus      .Abscess Leg - Right  10-01 @ 13:10   Numerous Staphylococcus aureus  --  Staphylococcus aureus      RADIOLOGY:  no new studies

## 2022-11-06 NOTE — PROGRESS NOTE ADULT - ASSESSMENT
Right buttock abscess - s/p I&D   MSSA bacteremia - repeat BC are negative     Plan:   ·	continue Ancef 1g iv q8h, dc this after today  ·	will need her PICC line removed once she completes her evening dose  ·	reconsult prn

## 2022-11-07 VITALS
TEMPERATURE: 98 F | HEART RATE: 67 BPM | RESPIRATION RATE: 18 BRPM | SYSTOLIC BLOOD PRESSURE: 135 MMHG | OXYGEN SATURATION: 96 % | DIASTOLIC BLOOD PRESSURE: 92 MMHG

## 2022-11-07 LAB
GLUCOSE BLDC GLUCOMTR-MCNC: 105 MG/DL — HIGH (ref 70–99)
GLUCOSE BLDC GLUCOMTR-MCNC: 109 MG/DL — HIGH (ref 70–99)

## 2022-11-07 PROCEDURE — 36573 INSJ PICC RS&I 5 YR+: CPT

## 2022-11-07 PROCEDURE — 96365 THER/PROPH/DIAG IV INF INIT: CPT

## 2022-11-07 PROCEDURE — 96367 TX/PROPH/DG ADDL SEQ IV INF: CPT

## 2022-11-07 PROCEDURE — U0005: CPT

## 2022-11-07 PROCEDURE — 85025 COMPLETE CBC W/AUTO DIFF WBC: CPT

## 2022-11-07 PROCEDURE — 86803 HEPATITIS C AB TEST: CPT

## 2022-11-07 PROCEDURE — 85027 COMPLETE CBC AUTOMATED: CPT

## 2022-11-07 PROCEDURE — 80048 BASIC METABOLIC PNL TOTAL CA: CPT

## 2022-11-07 PROCEDURE — 76937 US GUIDE VASCULAR ACCESS: CPT

## 2022-11-07 PROCEDURE — 83735 ASSAY OF MAGNESIUM: CPT

## 2022-11-07 PROCEDURE — 87040 BLOOD CULTURE FOR BACTERIA: CPT

## 2022-11-07 PROCEDURE — 86900 BLOOD TYPING SEROLOGIC ABO: CPT

## 2022-11-07 PROCEDURE — 36415 COLL VENOUS BLD VENIPUNCTURE: CPT

## 2022-11-07 PROCEDURE — 83605 ASSAY OF LACTIC ACID: CPT

## 2022-11-07 PROCEDURE — 86850 RBC ANTIBODY SCREEN: CPT

## 2022-11-07 PROCEDURE — 99285 EMERGENCY DEPT VISIT HI MDM: CPT

## 2022-11-07 PROCEDURE — C1751: CPT

## 2022-11-07 PROCEDURE — C1769: CPT

## 2022-11-07 PROCEDURE — 93312 ECHO TRANSESOPHAGEAL: CPT

## 2022-11-07 PROCEDURE — U0003: CPT

## 2022-11-07 PROCEDURE — 93325 DOPPLER ECHO COLOR FLOW MAPG: CPT

## 2022-11-07 PROCEDURE — 85730 THROMBOPLASTIN TIME PARTIAL: CPT

## 2022-11-07 PROCEDURE — 93306 TTE W/DOPPLER COMPLETE: CPT

## 2022-11-07 PROCEDURE — 87641 MR-STAPH DNA AMP PROBE: CPT

## 2022-11-07 PROCEDURE — 94640 AIRWAY INHALATION TREATMENT: CPT

## 2022-11-07 PROCEDURE — 87635 SARS-COV-2 COVID-19 AMP PRB: CPT

## 2022-11-07 PROCEDURE — 85652 RBC SED RATE AUTOMATED: CPT

## 2022-11-07 PROCEDURE — 87150 DNA/RNA AMPLIFIED PROBE: CPT

## 2022-11-07 PROCEDURE — 74177 CT ABD & PELVIS W/CONTRAST: CPT | Mod: MA

## 2022-11-07 PROCEDURE — 93320 DOPPLER ECHO COMPLETE: CPT

## 2022-11-07 PROCEDURE — 86901 BLOOD TYPING SEROLOGIC RH(D): CPT

## 2022-11-07 PROCEDURE — 80053 COMPREHEN METABOLIC PANEL: CPT

## 2022-11-07 PROCEDURE — 82962 GLUCOSE BLOOD TEST: CPT

## 2022-11-07 PROCEDURE — 84100 ASSAY OF PHOSPHORUS: CPT

## 2022-11-07 PROCEDURE — 87640 STAPH A DNA AMP PROBE: CPT

## 2022-11-07 PROCEDURE — 77001 FLUOROGUIDE FOR VEIN DEVICE: CPT

## 2022-11-07 PROCEDURE — 87186 SC STD MICRODIL/AGAR DIL: CPT

## 2022-11-07 PROCEDURE — 85610 PROTHROMBIN TIME: CPT

## 2022-11-07 PROCEDURE — 86140 C-REACTIVE PROTEIN: CPT

## 2022-11-07 PROCEDURE — 83036 HEMOGLOBIN GLYCOSYLATED A1C: CPT

## 2022-11-07 RX ADMIN — PANTOPRAZOLE SODIUM 40 MILLIGRAM(S): 20 TABLET, DELAYED RELEASE ORAL at 05:02

## 2022-11-07 RX ADMIN — LOSARTAN POTASSIUM 100 MILLIGRAM(S): 100 TABLET, FILM COATED ORAL at 05:03

## 2022-11-07 RX ADMIN — Medication 25 MILLIGRAM(S): at 05:03

## 2022-11-07 RX ADMIN — GABAPENTIN 100 MILLIGRAM(S): 400 CAPSULE ORAL at 05:02

## 2022-11-07 RX ADMIN — Medication 1 CAPSULE(S): at 10:55

## 2022-11-07 RX ADMIN — Medication 1 CAPSULE(S): at 11:55

## 2022-11-07 RX ADMIN — CHLORHEXIDINE GLUCONATE 1 APPLICATION(S): 213 SOLUTION TOPICAL at 05:04

## 2022-11-07 RX ADMIN — Medication 160 MILLIGRAM(S): at 05:03

## 2022-11-07 NOTE — CHART NOTE - NSCHARTNOTEFT_GEN_A_CORE
EVENT: Left arm PICC line removal  Removed pic line via left arm. After removal, picc line was carefully examined, noted to be same length 36cm, 4Fr that was inserted on 10/11/22. Patient tolerated removal of picc line. Dry 4x4 gauze placed via site with no bleeding noted.     OBJECTIVE:  Vital Signs Last 24 Hrs  T(C): 36.7 (07 Nov 2022 13:14), Max: 37 (06 Nov 2022 20:53)  T(F): 98.1 (07 Nov 2022 13:14), Max: 98.6 (06 Nov 2022 20:53)  HR: 67 (07 Nov 2022 13:14) (64 - 79)  BP: 135/92 (07 Nov 2022 13:14) (135/92 - 153/96)  BP(mean): 111 (07 Nov 2022 05:21) (111 - 111)  RR: 18 (07 Nov 2022 13:14) (16 - 18)  SpO2: 96% (07 Nov 2022 13:14) (95% - 98%)    Parameters below as of 07 Nov 2022 13:14  Patient On (Oxygen Delivery Method): room air        FOCUSED PHYSICAL EXAM:  Neuro: awake, alert, oriented x 3. No neuro deficit  Cardiovascular: Pulses +2 B/L in lower and upper extremities, HR regular, BP stable, No edema.  Respiratory: Respirations regular, unlabored, breath sounds clear B/L.     A/P  76 year old with PMH of HTN, DM is here for worsening of perineal abscess.  Patient presented with perineal abscess and had an I/D on 10/1 in the ED and was discharged on keflex.    CT A/P showed right gluteal fold  with infiltration w/o  discrete drainable collection

## 2022-11-07 NOTE — DISCHARGE NOTE NURSING/CASE MANAGEMENT/SOCIAL WORK - PATIENT PORTAL LINK FT
You can access the FollowMyHealth Patient Portal offered by [facility] by registering at the following website: [email or web address]. By joining [employer]’s FollowMyHealth portal, you will also be able to view your health information using other applications (apps) compatible with our system.

## 2022-11-07 NOTE — CHART NOTE - NSCHARTNOTEFT_GEN_A_CORE
Reassessment:   Nutrition note/follow up: Patient is a 76y old  Female who presents with a chief complaint of abscess (05 Nov 2022 12:35). Chart reviewed pt visited. Tolerating current diet well with no s/s of N/V/D reported, no chewing or swallowing difficulties. Pending discharge home.       Factors impacting intake: [ x] none [ ] nausea  [ ] vomiting [ ] diarrhea [ ] constipation  [ ]chewing problems [ ] swallowing issues  [ ] other:     Diet Prescription:   Intake: >75%    Daily     Daily   % Weight Change    Pertinent Medications: MEDICATIONS  (STANDING):  chlorhexidine 2% Cloths 1 Application(s) Topical <User Schedule>  dextrose 5%. 1000 milliLiter(s) (100 mL/Hr) IV Continuous <Continuous>  dextrose 5%. 1000 milliLiter(s) (50 mL/Hr) IV Continuous <Continuous>  dextrose 50% Injectable 25 Gram(s) IV Push once  dextrose 50% Injectable 12.5 Gram(s) IV Push once  dextrose 50% Injectable 25 Gram(s) IV Push once  enoxaparin Injectable 40 milliGRAM(s) SubCutaneous every 24 hours  gabapentin 100 milliGRAM(s) Oral two times a day  glucagon  Injectable 1 milliGRAM(s) IntraMuscular once  hydrochlorothiazide 25 milliGRAM(s) Oral daily  insulin lispro (ADMELOG) corrective regimen sliding scale   SubCutaneous at bedtime  insulin lispro (ADMELOG) corrective regimen sliding scale   SubCutaneous three times a day before meals  losartan 100 milliGRAM(s) Oral daily  montelukast 10 milliGRAM(s) Oral at bedtime  pantoprazole    Tablet      pantoprazole    Tablet 40 milliGRAM(s) Oral before breakfast  propranolol  milliGRAM(s) Oral daily  sodium chloride 0.9%. 1000 milliLiter(s) (80 mL/Hr) IV Continuous <Continuous>    MEDICATIONS  (PRN):  acetaminophen     Tablet .. 650 milliGRAM(s) Oral every 6 hours PRN Temp greater or equal to 38C (100.4F), Mild Pain (1 - 3)  acetaminophen 300 mG/butalbital 50 mG/ caffeine 40 mG 1 Capsule(s) Oral every 6 hours PRN migraine headache  aluminum hydroxide/magnesium hydroxide/simethicone Suspension 30 milliLiter(s) Oral every 4 hours PRN Dyspepsia  dextrose Oral Gel 15 Gram(s) Oral once PRN Blood Glucose LESS THAN 70 milliGRAM(s)/deciliter  sodium chloride 0.9% lock flush 10 milliLiter(s) IV Push every 1 hour PRN Pre/post blood products, medications, blood draw, and to maintain line patency    Pertinent Labs:      CAPILLARY BLOOD GLUCOSE      POCT Blood Glucose.: 105 mg/dL (07 Nov 2022 07:43)  POCT Blood Glucose.: 113 mg/dL (06 Nov 2022 21:16)  POCT Blood Glucose.: 105 mg/dL (06 Nov 2022 16:32)    Skin: intact    Estimated Needs:   [ x] no change since previous assessment  [ ] recalculated:           Interventions:   Recommend  [ ] Change Diet To:  [ ] Nutrition Supplement  [ ] Nutrition Support  [x ] Other: Continue with Consistent CHO diet with evening snacks. RD remains available.    Monitoring and Evaluation:   [ ] PO intake [ x ] Tolerance to diet prescription [ x ] weights [ x ] labs[ x ] follow up per protocol  [ ] other:

## 2022-11-07 NOTE — DISCHARGE NOTE NURSING/CASE MANAGEMENT/SOCIAL WORK - NSDCPEFALRISK_GEN_ALL_CORE
For information on Fall & Injury Prevention, visit: [email or web address] OR  [email or web address] OR  https://www.cdc.gov/steadi/patient.html

## 2023-03-03 NOTE — ED ADULT NURSE NOTE - NSFALLRSKASSESSDT_ED_ALL_ED
Transition of Care video discharge education - medication calendar given to patient  
Admission medication reconciliation POD1  
03-Oct-2022 14:05

## 2024-02-14 NOTE — PROGRESS NOTE ADULT - NS_MD_PANP_GEN_ALL_CORE
Try to watch the diet better.  Will repeat levels in 6 months.  When there are genetic factors sometimes you can watch your diet well but the cholesterol can continue to be high or go higher then we we will have to consider medications but you will have to be on that your whole life  
Attending and PA/NP shared services statement (NON-critical care):

## 2024-09-06 NOTE — DISCHARGE NOTE NURSING/CASE MANAGEMENT/SOCIAL WORK - NSDCVIVACCINE_GEN_ALL_CORE_FT
Tdap; 08-Oct-2015 20:10; Sundeep Barclay (RN); [employer]; [id number]; IntraMuscular; Ventrogluteal Right.; 0.5 milliLiter(s); VIS (VIS Published: 09-May-2013, VIS Presented: 08-Oct-2015);   
-3

## 2024-12-09 NOTE — PROGRESS NOTE ADULT - PROBLEM/PLAN-1
DISPLAY PLAN FREE TEXT
Detail Level: Detailed
DISPLAY PLAN FREE TEXT
Quality 394b: Td/Tdap Immunizations For Adolescents: Patient had one tetanus, diphtheria toxoids and acellular pertussis vaccine (Tdap) on or between the patient's 10th and 13th birthdays.
Quality 226: Preventive Care And Screening: Tobacco Use: Screening And Cessation Intervention: Patient screened for tobacco use and is an ex/non-smoker
Quality 130: Documentation Of Current Medications In The Medical Record: Current Medications Documented
DISPLAY PLAN FREE TEXT

## 2025-04-04 NOTE — PROGRESS NOTE ADULT - SUBJECTIVE AND OBJECTIVE BOX
NP Note.     Patient is a 76y old  Female who presents with a chief complaint of     INTERVAL HPI/OVERNIGHT EVENTS: offers no new complaints    MEDICATIONS  (STANDING):  ceFAZolin   IVPB 1000 milliGRAM(s) IV Intermittent every 8 hours  dextrose 5%. 1000 milliLiter(s) (50 mL/Hr) IV Continuous <Continuous>  dextrose 5%. 1000 milliLiter(s) (100 mL/Hr) IV Continuous <Continuous>  dextrose 50% Injectable 25 Gram(s) IV Push once  dextrose 50% Injectable 12.5 Gram(s) IV Push once  dextrose 50% Injectable 25 Gram(s) IV Push once  enoxaparin Injectable 40 milliGRAM(s) SubCutaneous every 24 hours  glucagon  Injectable 1 milliGRAM(s) IntraMuscular once  insulin lispro (ADMELOG) corrective regimen sliding scale   SubCutaneous three times a day before meals  insulin lispro (ADMELOG) corrective regimen sliding scale   SubCutaneous at bedtime  sodium chloride 0.9%. 1000 milliLiter(s) (80 mL/Hr) IV Continuous <Continuous>    MEDICATIONS  (PRN):  acetaminophen     Tablet .. 650 milliGRAM(s) Oral every 6 hours PRN Temp greater or equal to 38C (100.4F), Mild Pain (1 - 3)  dextrose Oral Gel 15 Gram(s) Oral once PRN Blood Glucose LESS THAN 70 milliGRAM(s)/deciliter      __________________________________________________  REVIEW OF SYSTEMS:    CONSTITUTIONAL: No fever,   EYES: no acute visual disturbances  NECK: No pain or stiffness  RESPIRATORY: No cough; No shortness of breath  CARDIOVASCULAR: No chest pain, no palpitations  GASTROINTESTINAL:(+) rectal discomfort, relieved after Morphine.  No abd  pain. No nausea or vomiting; No diarrhea   NEUROLOGICAL: No headache or numbness, no tremors  MUSCULOSKELETAL: No joint pain, no muscle pain  GENITOURINARY: no dysuria, no frequency, no hesitancy  PSYCHIATRY: no depression , no anxiety  ALL OTHER  ROS negative        Vital Signs Last 24 Hrs  T(C): 36.8 (07 Oct 2022 07:20), Max: 37.1 (06 Oct 2022 20:27)  T(F): 98.3 (07 Oct 2022 07:20), Max: 98.8 (06 Oct 2022 20:27)  HR: 61 (07 Oct 2022 07:20) (53 - 62)  BP: 133/73 (07 Oct 2022 07:20) (116/73 - 133/73)  BP(mean): --  RR: 17 (07 Oct 2022 07:20) (16 - 18)  SpO2: 94% (07 Oct 2022 07:20) (94% - 100%)    Parameters below as of 07 Oct 2022 07:20  Patient On (Oxygen Delivery Method): room air        ________________________________________________  PHYSICAL EXAM:  GENERAL: NAD  HEENT: Normocephalic;  conjunctivae and sclerae clear; moist mucous membranes;   NECK : supple  CHEST/LUNG: Clear to auscultation bilaterally with good air entry   HEART: S1 S2  regular; no murmurs, gallops or rubs  ABDOMEN: Soft, Nontender, Nondistended; Bowel sounds present  EXTREMITIES: no cyanosis; no edema; no calf tenderness  SKIN: warm and dry; no rash  NERVOUS SYSTEM:  Awake and alert; no new deficits    _________________________________________________  LABS:                        12.4   6.57  )-----------( 329      ( 07 Oct 2022 06:30 )             38.3     10-07    138  |  103  |  15  ----------------------------<  141<H>  4.1   |  26  |  0.95    Ca    9.1      07 Oct 2022 06:30  Phos  3.7     10-07  Mg     2.2     10-07    TPro  7.9  /  Alb  3.4<L>  /  TBili  0.3  /  DBili  x   /  AST  19  /  ALT  18  /  AlkPhos  81  10-06    PT/INR - ( 06 Oct 2022 12:00 )   PT: 11.4 sec;   INR: 0.96 ratio         PTT - ( 06 Oct 2022 12:00 )  PTT:26.0 sec    CAPILLARY BLOOD GLUCOSE            RADIOLOGY & ADDITIONAL TESTS:        Consultant(s) Notes Reviewed:   YES/ No    Care Discussed with Consultants :     Plan of care was discussed with patient and /or primary care giver; all questions and concerns were addressed and care was aligned with patient's wishes.     NP Note discussed with attending.     Patient is a 76y old  Female who presents with a chief complaint of worsening perineal abscess     INTERVAL HPI/OVERNIGHT EVENTS: offers no new complaints  Pt seen and examined this morning.   Pt awake/alert, in bed, endorses pain relief at rectal area after Morphine.  Had N/V 2 days ago, now resolved.     MEDICATIONS  (STANDING):  ceFAZolin   IVPB 1000 milliGRAM(s) IV Intermittent every 8 hours  dextrose 5%. 1000 milliLiter(s) (50 mL/Hr) IV Continuous <Continuous>  dextrose 5%. 1000 milliLiter(s) (100 mL/Hr) IV Continuous <Continuous>  dextrose 50% Injectable 25 Gram(s) IV Push once  dextrose 50% Injectable 12.5 Gram(s) IV Push once  dextrose 50% Injectable 25 Gram(s) IV Push once  enoxaparin Injectable 40 milliGRAM(s) SubCutaneous every 24 hours  glucagon  Injectable 1 milliGRAM(s) IntraMuscular once  insulin lispro (ADMELOG) corrective regimen sliding scale   SubCutaneous three times a day before meals  insulin lispro (ADMELOG) corrective regimen sliding scale   SubCutaneous at bedtime  sodium chloride 0.9%. 1000 milliLiter(s) (80 mL/Hr) IV Continuous <Continuous>    MEDICATIONS  (PRN):  acetaminophen     Tablet .. 650 milliGRAM(s) Oral every 6 hours PRN Temp greater or equal to 38C (100.4F), Mild Pain (1 - 3)  dextrose Oral Gel 15 Gram(s) Oral once PRN Blood Glucose LESS THAN 70 milliGRAM(s)/deciliter      __________________________________________________  REVIEW OF SYSTEMS:    CONSTITUTIONAL: No fever,   EYES: no acute visual disturbances  NECK: No pain or stiffness  RESPIRATORY: No cough; No shortness of breath  CARDIOVASCULAR: No chest pain, no palpitations  GASTROINTESTINAL:(+) rectal discomfort, relieved after Morphine.  No abd  pain. No nausea or vomiting; No diarrhea   NEUROLOGICAL: No headache or numbness, no tremors  MUSCULOSKELETAL: No joint pain, no muscle pain  GENITOURINARY: no dysuria, no frequency, no hesitancy  PSYCHIATRY: no depression , no anxiety  ALL OTHER  ROS negative        Vital Signs Last 24 Hrs  T(C): 36.8 (07 Oct 2022 07:20), Max: 37.1 (06 Oct 2022 20:27)  T(F): 98.3 (07 Oct 2022 07:20), Max: 98.8 (06 Oct 2022 20:27)  HR: 61 (07 Oct 2022 07:20) (53 - 62)  BP: 133/73 (07 Oct 2022 07:20) (116/73 - 133/73)  BP(mean): --  RR: 17 (07 Oct 2022 07:20) (16 - 18)  SpO2: 94% (07 Oct 2022 07:20) (94% - 100%)    Parameters below as of 07 Oct 2022 07:20  Patient On (Oxygen Delivery Method): room air  ________________________________________________  PHYSICAL EXAM:  GENERAL: NAD  HEENT: Normocephalic;  conjunctivae and sclerae clear; moist mucous membranes;   NECK : supple  CHEST/LUNG: Clear to auscultation bilaterally with good air entry   HEART: S1 S2  regular; no murmurs, gallops or rubs  ABDOMEN: Soft, Nontender, Nondistended; Bowel sounds present  EXTREMITIES: no cyanosis; no edema; no calf tenderness  SKIN: warm and dry; no rash  NERVOUS SYSTEM:  Awake and alert; no new deficits    _________________________________________________  LABS:                        12.4   6.57  )-----------( 329      ( 07 Oct 2022 06:30 )             38.3     10-07    138  |  103  |  15  ----------------------------<  141<H>  4.1   |  26  |  0.95    Ca    9.1      07 Oct 2022 06:30  Phos  3.7     10-07  Mg     2.2     10-07    TPro  7.9  /  Alb  3.4<L>  /  TBili  0.3  /  DBili  x   /  AST  19  /  ALT  18  /  AlkPhos  81  10-06    PT/INR - ( 06 Oct 2022 12:00 )   PT: 11.4 sec;   INR: 0.96 ratio         PTT - ( 06 Oct 2022 12:00 )  PTT:26.0 sec    CAPILLARY BLOOD GLUCOSE    RADIOLOGY & ADDITIONAL TESTS:        Consultant(s) Notes Reviewed:   YES/ No    Care Discussed with Consultants :     Plan of care was discussed with patient and /or primary care giver; all questions and concerns were addressed and care was aligned with patient's wishes.     -Resolved.